# Patient Record
Sex: FEMALE | Race: WHITE | Employment: OTHER | URBAN - METROPOLITAN AREA
[De-identification: names, ages, dates, MRNs, and addresses within clinical notes are randomized per-mention and may not be internally consistent; named-entity substitution may affect disease eponyms.]

---

## 2019-06-07 ENCOUNTER — HOSPITAL ENCOUNTER (OUTPATIENT)
Dept: GENERAL RADIOLOGY | Age: 64
Discharge: HOME | End: 2019-06-09
Payer: MEDICARE

## 2019-06-07 DIAGNOSIS — Z13.9 VISIT FOR SCREENING: ICD-10-CM

## 2019-06-07 PROCEDURE — 77063 BREAST TOMOSYNTHESIS BI: CPT

## 2021-04-09 ENCOUNTER — IMMUNIZATION (OUTPATIENT)
Dept: PRIMARY CARE CLINIC | Age: 66
End: 2021-04-09
Payer: MEDICARE

## 2021-04-09 PROCEDURE — 91301 COVID-19, MODERNA VACCINE 100MCG/0.5ML DOSE: CPT | Performed by: NURSE PRACTITIONER

## 2021-04-09 PROCEDURE — 0011A COVID-19, MODERNA VACCINE 100MCG/0.5ML DOSE: CPT | Performed by: NURSE PRACTITIONER

## 2021-05-07 ENCOUNTER — IMMUNIZATION (OUTPATIENT)
Dept: PRIMARY CARE CLINIC | Age: 66
End: 2021-05-07
Payer: MEDICARE

## 2021-05-07 PROCEDURE — 91301 COVID-19, MODERNA VACCINE 100MCG/0.5ML DOSE: CPT | Performed by: NURSE PRACTITIONER

## 2021-05-07 PROCEDURE — 0012A COVID-19, MODERNA VACCINE 100MCG/0.5ML DOSE: CPT | Performed by: NURSE PRACTITIONER

## 2021-06-01 ENCOUNTER — HOSPITAL ENCOUNTER (OUTPATIENT)
Dept: GENERAL RADIOLOGY | Age: 66
Discharge: HOME | End: 2021-06-03
Payer: MEDICARE

## 2021-06-01 DIAGNOSIS — Z12.31 VISIT FOR SCREENING MAMMOGRAM: ICD-10-CM

## 2021-06-01 PROCEDURE — 77063 BREAST TOMOSYNTHESIS BI: CPT

## 2022-05-05 ENCOUNTER — HOSPITAL ENCOUNTER (INPATIENT)
Age: 67
LOS: 8 days | Discharge: HOME | DRG: 418 | End: 2022-05-13
Attending: EMERGENCY MEDICINE | Admitting: INTERNAL MEDICINE
Payer: MEDICARE

## 2022-05-05 ENCOUNTER — APPOINTMENT (OUTPATIENT)
Dept: CT IMAGING | Age: 67
DRG: 418 | End: 2022-05-05
Payer: MEDICARE

## 2022-05-05 ENCOUNTER — APPOINTMENT (OUTPATIENT)
Dept: GENERAL RADIOLOGY | Age: 67
DRG: 418 | End: 2022-05-05
Payer: MEDICARE

## 2022-05-05 ENCOUNTER — APPOINTMENT (OUTPATIENT)
Dept: ULTRASOUND IMAGING | Age: 67
DRG: 418 | End: 2022-05-05
Payer: MEDICARE

## 2022-05-05 DIAGNOSIS — K81.0 ACUTE CHOLECYSTITIS: Primary | ICD-10-CM

## 2022-05-05 DIAGNOSIS — K43.9 VENTRAL HERNIA WITHOUT OBSTRUCTION OR GANGRENE: ICD-10-CM

## 2022-05-05 DIAGNOSIS — N28.89 RENAL MASS: ICD-10-CM

## 2022-05-05 PROBLEM — E87.20 LACTIC ACID ACIDOSIS: Status: ACTIVE | Noted: 2022-05-05

## 2022-05-05 PROBLEM — E66.01 MORBID OBESITY WITH BMI OF 40.0-44.9, ADULT (HCC): Status: ACTIVE | Noted: 2022-05-05

## 2022-05-05 PROBLEM — R16.0 HEPATOMEGALY: Status: ACTIVE | Noted: 2022-05-05

## 2022-05-05 PROBLEM — I10 PRIMARY HYPERTENSION: Status: ACTIVE | Noted: 2022-05-05

## 2022-05-05 PROBLEM — E05.90 HYPERTHYROIDISM: Status: ACTIVE | Noted: 2022-05-05

## 2022-05-05 PROBLEM — G47.33 OSA (OBSTRUCTIVE SLEEP APNEA): Status: ACTIVE | Noted: 2022-05-05

## 2022-05-05 PROBLEM — E11.9 TYPE 2 DIABETES MELLITUS, WITHOUT LONG-TERM CURRENT USE OF INSULIN (HCC): Status: ACTIVE | Noted: 2022-05-05

## 2022-05-05 PROBLEM — J90 PLEURAL EFFUSION ON RIGHT: Status: ACTIVE | Noted: 2022-05-05

## 2022-05-05 PROBLEM — E78.5 HLD (HYPERLIPIDEMIA): Status: ACTIVE | Noted: 2022-05-05

## 2022-05-05 LAB
ALBUMIN SERPL-MCNC: 4.2 G/DL (ref 3.5–5.2)
ALP BLD-CCNC: 57 U/L (ref 35–104)
ALT SERPL-CCNC: 16 U/L (ref 0–32)
ANION GAP SERPL CALCULATED.3IONS-SCNC: 15 MMOL/L (ref 7–16)
AST SERPL-CCNC: 15 U/L (ref 0–31)
BACTERIA: ABNORMAL /HPF
BASOPHILS ABSOLUTE: 0.05 E9/L (ref 0–0.2)
BASOPHILS RELATIVE PERCENT: 0.3 % (ref 0–2)
BILIRUB SERPL-MCNC: 0.7 MG/DL (ref 0–1.2)
BILIRUBIN URINE: NEGATIVE
BLOOD, URINE: NEGATIVE
BUN BLDV-MCNC: 18 MG/DL (ref 6–23)
CALCIUM SERPL-MCNC: 9.7 MG/DL (ref 8.6–10.2)
CHLORIDE BLD-SCNC: 98 MMOL/L (ref 98–107)
CLARITY: CLEAR
CO2: 24 MMOL/L (ref 22–29)
COLOR: YELLOW
CREAT SERPL-MCNC: 1 MG/DL (ref 0.5–1)
EOSINOPHILS ABSOLUTE: 0.02 E9/L (ref 0.05–0.5)
EOSINOPHILS RELATIVE PERCENT: 0.1 % (ref 0–6)
EPITHELIAL CELLS, UA: ABNORMAL /HPF
GFR AFRICAN AMERICAN: >60
GFR NON-AFRICAN AMERICAN: 56 ML/MIN/1.73
GLUCOSE BLD-MCNC: 167 MG/DL (ref 74–99)
GLUCOSE URINE: >=1000 MG/DL
HCT VFR BLD CALC: 43.2 % (ref 34–48)
HEMOGLOBIN: 14.1 G/DL (ref 11.5–15.5)
IMMATURE GRANULOCYTES #: 0.06 E9/L
IMMATURE GRANULOCYTES %: 0.4 % (ref 0–5)
KETONES, URINE: 15 MG/DL
LACTIC ACID, SEPSIS: 3.6 MMOL/L (ref 0.5–1.9)
LACTIC ACID, SEPSIS: 3.8 MMOL/L (ref 0.5–1.9)
LEUKOCYTE ESTERASE, URINE: NEGATIVE
LIPASE: 22 U/L (ref 13–60)
LYMPHOCYTES ABSOLUTE: 2.15 E9/L (ref 1.5–4)
LYMPHOCYTES RELATIVE PERCENT: 13 % (ref 20–42)
MCH RBC QN AUTO: 27.5 PG (ref 26–35)
MCHC RBC AUTO-ENTMCNC: 32.6 % (ref 32–34.5)
MCV RBC AUTO: 84.2 FL (ref 80–99.9)
MONOCYTES ABSOLUTE: 2.08 E9/L (ref 0.1–0.95)
MONOCYTES RELATIVE PERCENT: 12.6 % (ref 2–12)
NEUTROPHILS ABSOLUTE: 12.15 E9/L (ref 1.8–7.3)
NEUTROPHILS RELATIVE PERCENT: 73.6 % (ref 43–80)
NITRITE, URINE: NEGATIVE
PDW BLD-RTO: 15.1 FL (ref 11.5–15)
PH UA: 5.5 (ref 5–9)
PLATELET # BLD: 483 E9/L (ref 130–450)
PMV BLD AUTO: 9.5 FL (ref 7–12)
POTASSIUM SERPL-SCNC: 4.5 MMOL/L (ref 3.5–5)
PROTEIN UA: NEGATIVE MG/DL
RBC # BLD: 5.13 E12/L (ref 3.5–5.5)
RBC UA: ABNORMAL /HPF (ref 0–2)
SODIUM BLD-SCNC: 137 MMOL/L (ref 132–146)
SPECIFIC GRAVITY UA: 1.01 (ref 1–1.03)
TOTAL PROTEIN: 7.6 G/DL (ref 6.4–8.3)
TROPONIN, HIGH SENSITIVITY: 10 NG/L (ref 0–9)
UROBILINOGEN, URINE: 0.2 E.U./DL
WBC # BLD: 16.5 E9/L (ref 4.5–11.5)
WBC UA: ABNORMAL /HPF (ref 0–5)

## 2022-05-05 PROCEDURE — 87088 URINE BACTERIA CULTURE: CPT

## 2022-05-05 PROCEDURE — 74178 CT ABD&PLV WO CNTR FLWD CNTR: CPT

## 2022-05-05 PROCEDURE — 80053 COMPREHEN METABOLIC PANEL: CPT

## 2022-05-05 PROCEDURE — 81001 URINALYSIS AUTO W/SCOPE: CPT

## 2022-05-05 PROCEDURE — 83690 ASSAY OF LIPASE: CPT

## 2022-05-05 PROCEDURE — 36415 COLL VENOUS BLD VENIPUNCTURE: CPT

## 2022-05-05 PROCEDURE — 6360000004 HC RX CONTRAST MEDICATION: Performed by: RADIOLOGY

## 2022-05-05 PROCEDURE — 96365 THER/PROPH/DIAG IV INF INIT: CPT

## 2022-05-05 PROCEDURE — 85025 COMPLETE CBC W/AUTO DIFF WBC: CPT

## 2022-05-05 PROCEDURE — 2500000003 HC RX 250 WO HCPCS: Performed by: NURSE PRACTITIONER

## 2022-05-05 PROCEDURE — 2140000000 HC CCU INTERMEDIATE R&B

## 2022-05-05 PROCEDURE — 96361 HYDRATE IV INFUSION ADD-ON: CPT

## 2022-05-05 PROCEDURE — 99285 EMERGENCY DEPT VISIT HI MDM: CPT

## 2022-05-05 PROCEDURE — 83605 ASSAY OF LACTIC ACID: CPT

## 2022-05-05 PROCEDURE — 6360000002 HC RX W HCPCS: Performed by: NURSE PRACTITIONER

## 2022-05-05 PROCEDURE — 96375 TX/PRO/DX INJ NEW DRUG ADDON: CPT

## 2022-05-05 PROCEDURE — A4216 STERILE WATER/SALINE, 10 ML: HCPCS | Performed by: NURSE PRACTITIONER

## 2022-05-05 PROCEDURE — 71045 X-RAY EXAM CHEST 1 VIEW: CPT

## 2022-05-05 PROCEDURE — 87040 BLOOD CULTURE FOR BACTERIA: CPT

## 2022-05-05 PROCEDURE — 84484 ASSAY OF TROPONIN QUANT: CPT

## 2022-05-05 PROCEDURE — 2580000003 HC RX 258: Performed by: NURSE PRACTITIONER

## 2022-05-05 PROCEDURE — 76705 ECHO EXAM OF ABDOMEN: CPT

## 2022-05-05 PROCEDURE — 93005 ELECTROCARDIOGRAM TRACING: CPT | Performed by: NURSE PRACTITIONER

## 2022-05-05 RX ORDER — 0.9 % SODIUM CHLORIDE 0.9 %
1000 INTRAVENOUS SOLUTION INTRAVENOUS ONCE
Status: COMPLETED | OUTPATIENT
Start: 2022-05-05 | End: 2022-05-05

## 2022-05-05 RX ORDER — METRONIDAZOLE 500 MG/100ML
500 INJECTION, SOLUTION INTRAVENOUS ONCE
Status: COMPLETED | OUTPATIENT
Start: 2022-05-05 | End: 2022-05-05

## 2022-05-05 RX ORDER — ONDANSETRON 2 MG/ML
4 INJECTION INTRAMUSCULAR; INTRAVENOUS ONCE
Status: COMPLETED | OUTPATIENT
Start: 2022-05-05 | End: 2022-05-05

## 2022-05-05 RX ORDER — ISOSORBIDE MONONITRATE 30 MG/1
30 TABLET, EXTENDED RELEASE ORAL DAILY
COMMUNITY

## 2022-05-05 RX ORDER — MORPHINE SULFATE 4 MG/ML
4 INJECTION, SOLUTION INTRAMUSCULAR; INTRAVENOUS ONCE
Status: COMPLETED | OUTPATIENT
Start: 2022-05-05 | End: 2022-05-05

## 2022-05-05 RX ORDER — 0.9 % SODIUM CHLORIDE 0.9 %
500 INTRAVENOUS SOLUTION INTRAVENOUS ONCE
Status: COMPLETED | OUTPATIENT
Start: 2022-05-05 | End: 2022-05-05

## 2022-05-05 RX ORDER — 0.9 % SODIUM CHLORIDE 0.9 %
1000 INTRAVENOUS SOLUTION INTRAVENOUS ONCE
Status: COMPLETED | OUTPATIENT
Start: 2022-05-05 | End: 2022-05-06

## 2022-05-05 RX ADMIN — SODIUM CHLORIDE 500 ML: 9 INJECTION, SOLUTION INTRAVENOUS at 15:58

## 2022-05-05 RX ADMIN — WATER 1000 MG: 1 INJECTION INTRAMUSCULAR; INTRAVENOUS; SUBCUTANEOUS at 19:46

## 2022-05-05 RX ADMIN — SODIUM CHLORIDE 1000 ML: 9 INJECTION, SOLUTION INTRAVENOUS at 23:41

## 2022-05-05 RX ADMIN — ONDANSETRON 4 MG: 2 INJECTION INTRAMUSCULAR; INTRAVENOUS at 15:58

## 2022-05-05 RX ADMIN — METRONIDAZOLE 500 MG: 500 INJECTION, SOLUTION INTRAVENOUS at 19:46

## 2022-05-05 RX ADMIN — SODIUM CHLORIDE 1000 ML: 9 INJECTION, SOLUTION INTRAVENOUS at 19:03

## 2022-05-05 RX ADMIN — FAMOTIDINE 20 MG: 10 INJECTION INTRAVENOUS at 19:46

## 2022-05-05 RX ADMIN — MORPHINE SULFATE 4 MG: 4 INJECTION, SOLUTION INTRAMUSCULAR; INTRAVENOUS at 15:59

## 2022-05-05 RX ADMIN — IOPAMIDOL 75 ML: 755 INJECTION, SOLUTION INTRAVENOUS at 17:10

## 2022-05-05 ASSESSMENT — PAIN DESCRIPTION - DESCRIPTORS
DESCRIPTORS: DISCOMFORT
DESCRIPTORS: DISCOMFORT;SHARP

## 2022-05-05 ASSESSMENT — PAIN DESCRIPTION - LOCATION
LOCATION: ABDOMEN
LOCATION: ABDOMEN

## 2022-05-05 ASSESSMENT — PAIN SCALES - GENERAL
PAINLEVEL_OUTOF10: 4
PAINLEVEL_OUTOF10: 7

## 2022-05-05 ASSESSMENT — PAIN - FUNCTIONAL ASSESSMENT
PAIN_FUNCTIONAL_ASSESSMENT: PREVENTS OR INTERFERES SOME ACTIVE ACTIVITIES AND ADLS
PAIN_FUNCTIONAL_ASSESSMENT: 0-10

## 2022-05-05 ASSESSMENT — PAIN DESCRIPTION - ORIENTATION: ORIENTATION: RIGHT;LEFT

## 2022-05-05 NOTE — ED PROVIDER NOTES
ED Attending shared visit  CC: No           [address] [facility]  Department of Emergency Medicine   ED  Encounter Note  Admit Date/RoomTime: 2022  3:16 PM  ED Room:   NAME: Mildred Collins  : 1955  MRN: [id number]     Chief Complaint:  Emesis (onset 2 am with n/v. Having dry heaves and LUQ, RLQ pain)    HISTORY OF PRESENT ILLNESS        Mildred Collins is a 61 y.o. female who presents to the ED for evaluation of nausea vomiting and abdominal pain beginning at 2 AM.  She denies any close contacts with similar symptoms, suspicious food intake or recent travel. She has had similar symptoms last month. She did eat roasted chicken and mashed potatoes last night for dinner. There is been no associated fever, chills, chest pain, shortness of breath, hematemesis, bowel changes, UTI symptoms, back pain or leg swelling. She reports her emesis to look like \"bile. \"  She has not taken any over-the-counter intervention for symptoms. She has no prior history of pancreatitis, gallbladder disease, liver disease or diverticulitis. She denies alcohol use. Her symptoms are moderate in severity and persistent nature. ROS   Pertinent positives and negatives are stated within HPI, all other systems reviewed and are negative. Past Medical History:  has a past medical history of Allergic rhinitis, Cerebrovascular disease, Cerebrovascular disease, Diabetes ([address] Hyperlipidemia, Hypertension, Irregular heart beat, Myocardial infarct ([address] Neuropathy, Obesity, Sleep apnea, Thyroid disease, and Weakness due to cerebrovascular accident (CVA). Surgical History:  has a past surgical history that includes Hysterectomy (); Colonoscopy (); and Colonoscopy (2016). Social History:  reports that she has never smoked. She has never used smokeless tobacco. She reports that she does not drink alcohol and does not use drugs.     Family History: family history includes Cancer in her maternal grandmother; Clotting Disorder in her brother; Diabetes in her father, maternal aunt, mother, paternal grandmother, and sister; Heart Attack in her brother; Heart Disease in her brother, brother, father, paternal grandfather, and sister; High Blood Pressure in her brother, father, mother, and sister; High Cholesterol in her mother; Kidney Disease in her maternal aunt. Allergies: Latex, Doxycycline, Seasonal, and Adhesive tape    PHYSICAL EXAM   Oxygen Saturation Interpretation: Normal on room air analysis. ED Triage Vitals [05/05/22 1459]   BP Temp Temp src Pulse Resp SpO2 Height Weight   (!) 143/73 97.1 °F (36.2 °C) -- 102 16 96 % 5' 1\" (1.549 m) 235 lb (106.6 kg)       Physical Exam  Constitutional/General: Alert and oriented x3, well appearing, non toxic  HEENT:  NC/NT. PERRLA. Oral mucosa moist. Airway patent. Neck: Supple, full ROM. No midline vertebral tenderness or crepitus. Respiratory: Lung sounds clear to auscultation bilaterally. No wheezes, rhonchi or stridor. Not in respiratory distress. CV:  Regular rate. Regular rhythm. No resting tachycardia on exam. No murmurs or rubs. 2+ distal pulses. GI:  Abdomen soft, tenderness diffuse through the upper abdomen with positive Olguin sign, diastasis recti. +BS. No rebound or rigidity. No pulsatile masses. Musculoskeletal: Moves all extremities x 4. Warm and well perfused. Capillary refill <3 seconds  Integument: Skin warm and dry. No rashes. Neurologic: Alert and oriented with no focal deficits, symmetric strength 5/5 in the upper and lower extremities bilaterally.     Lab / Imaging Results   (All laboratory and radiology results have been personally reviewed by myself)  Labs:  Results for orders placed or performed during the hospital encounter of 05/05/22   Comprehensive Metabolic Panel   Result Value Ref Range    Sodium 137 132 - 146 mmol/L    Potassium 4.5 3.5 - 5.0 mmol/L    Chloride 98 98 - 107 mmol/L    CO2 24 22 - 29 mmol/L    Anion Gap 15 7 - 16 mmol/L    Glucose 167 (H) 74 - 99 mg/dL    BUN 18 6 - 23 mg/dL    CREATININE 1.0 0.5 - 1.0 mg/dL    GFR Non-African American 56 >=60 mL/min/1.73    GFR African American >60     Calcium 9.7 8.6 - 10.2 mg/dL    Total Protein 7.6 6.4 - 8.3 g/dL    Albumin 4.2 3.5 - 5.2 g/dL    Total Bilirubin 0.7 0.0 - 1.2 mg/dL    Alkaline Phosphatase 57 35 - 104 U/L    ALT 16 0 - 32 U/L    AST 15 0 - 31 U/L   Lactate, Sepsis   Result Value Ref Range    Lactic Acid, Sepsis 3.6 (HH) 0.5 - 1.9 mmol/L   Lipase   Result Value Ref Range    Lipase 22 13 - 60 U/L   CBC with Auto Differential   Result Value Ref Range    WBC 16.5 (H) 4.5 - 11.5 E9/L    RBC 5.13 3.50 - 5.50 E12/L    Hemoglobin 14.1 11.5 - 15.5 g/dL    Hematocrit 43.2 34.0 - 48.0 %    MCV 84.2 80.0 - 99.9 fL    MCH 27.5 26.0 - 35.0 pg    MCHC 32.6 32.0 - 34.5 %    RDW 15.1 (H) 11.5 - 15.0 fL    Platelets 397 (H) 269 - 450 E9/L    MPV 9.5 7.0 - 12.0 fL    Neutrophils % 73.6 43.0 - 80.0 %    Immature Granulocytes % 0.4 0.0 - 5.0 %    Lymphocytes % 13.0 (L) 20.0 - 42.0 %    Monocytes % 12.6 (H) 2.0 - 12.0 %    Eosinophils % 0.1 0.0 - 6.0 %    Basophils % 0.3 0.0 - 2.0 %    Neutrophils Absolute 12.15 (H) 1.80 - 7.30 E9/L    Immature Granulocytes # 0.06 E9/L    Lymphocytes Absolute 2.15 1.50 - 4.00 E9/L    Monocytes Absolute 2.08 (H) 0.10 - 0.95 E9/L    Eosinophils Absolute 0.02 (L) 0.05 - 0.50 E9/L    Basophils Absolute 0.05 0.00 - 0.20 E9/L   Urinalysis with Microscopic   Result Value Ref Range    Color, UA Yellow Straw/Yellow    Clarity, UA Clear Clear    Glucose, Ur >=1000 (A) Negative mg/dL    Bilirubin Urine Negative Negative    Ketones, Urine 15 (A) Negative mg/dL    Specific Gravity, UA 1.010 1.005 - 1.030    Blood, Urine Negative Negative    pH, UA 5.5 5.0 - 9.0    Protein, UA Negative Negative mg/dL    Urobilinogen, Urine 0.2 <2.0 E.U./dL    Nitrite, Urine Negative Negative    Leukocyte Esterase, Urine Negative Negative    WBC, UA 0-1 0 - 5 /HPF    RBC, UA NONE 0 - 2 /HPF Epithelial Cells, UA RARE /HPF    Bacteria, UA RARE (A) None Seen /HPF   EKG 12 Lead   Result Value Ref Range    Ventricular Rate 97 BPM    Atrial Rate 97 BPM    P-R Interval 168 ms    QRS Duration 64 ms    Q-T Interval 336 ms    QTc Calculation (Bazett) 426 ms    P Axis 59 degrees    R Axis -38 degrees    T Axis 62 degrees     Imaging: All Radiology results interpreted by Radiologist unless otherwise noted. CT ABDOMEN PELVIS W WO CONTRAST Additional Contrast? None   Final Result   1. Cholelithiasis with suggestion of acute cholecystitis. 2. Lobular masslike contour at the inferior pole of the right kidney is   nearly isodense to normal renal cortex. This is suspicious for enhancing   mass. Recommend further characterization with MRI without and with   intravenous contrast.   3. Left adrenal lipoma or myelolipoma. 4. Multiple ventral hernias. US ABDOMEN LIMITED   Final Result   1. Hepatomegaly with fatty infiltration. 2.  Hydropic gallbladder with cholelithiasis and sludge. Gallbladder wall   thickening is also seen. Rule out cholecystitis. Clinical correlation is   recommended. Radionuclide medicine hepatobiliary imaging can be considered   for further evaluation. 3.  Minimal dilatation of the CBD to 0.7 cm. Rule out choledocholithiasis. MRCP can be considered for further evaluation. 4.  Solid right renal mass. Rule out renal cell carcinoma. CT scan of the   abdomen and pelvis with and without intravenous contrast is recommended. 5.  Right pleural effusion. XR CHEST PORTABLE   Final Result   No acute process. EKG #1:  Interpreted by emergency department attending physician unless otherwise noted.     5/5/22  Time: 1529    Rhythm: normal sinus   Rate: 97  Axis: normal  Conduction: normal  ST Segments: no acute change  T Waves: non specific changes  Clinical Impression: NSR, no STEMI asd interpreted by Dr. Mikayla Spain  Comparison to Prior tracings: There are no previous tracings available for comparison. ED Course / Medical Decision Making     Medications   famotidine (PEPCID) 20 mg in sodium chloride (PF) 10 mL injection (20 mg IntraVENous Given 5/5/22 1946)   metronidazole (FLAGYL) 500 mg in 0.9% NaCl 100 mL IVPB premix (500 mg IntraVENous New Bag 5/5/22 1946)   0.9 % sodium chloride bolus (0 mLs IntraVENous Stopped 5/5/22 1706)   ondansetron (ZOFRAN) injection 4 mg (4 mg IntraVENous Given 5/5/22 1558)   morphine sulfate (PF) injection 4 mg (4 mg IntraVENous Given 5/5/22 1559)   0.9 % sodium chloride bolus (1,000 mLs IntraVENous New Bag 5/5/22 1903)   iopamidol (ISOVUE-370) 76 % injection 75 mL (75 mLs IntraVENous Given 5/5/22 1710)   cefTRIAXone (ROCEPHIN) 1,000 mg in sterile water 10 mL IV syringe (1,000 mg IntraVENous Given 5/5/22 1946)        Re-examination:  5/5/22       Time: 1730  Patients condition is improving after treatment. Reports her pain to be improved after medications. She has had no emesis. Abdomen remains soft but tender. Discussed ultrasound results and elevated lactate and white count requiring admission. She states she had an appointment with general surgery as referred by her PCP but has yet to establish with Dr. Portillo Rangel. She prefers [facility] for admission. Time: 2000  Patient aware of admission arrangements and to be n.p.o. She continues to be comfortable and no emesis. Abdomen soft. Consult(s):   IP CONSULT TO GENERAL SURGERY  IP CONSULT TO HOSPITALIST    Time: 5 Spoke to Dr. Portillo Rangel who accepts the patient for consultation at [facility] and recommends Rocephin 1 g and Flagyl 500 IV and residents will see the patient upon arrival to her room assignment. He is aware of the renal mass and radioactive iodine as well. Hospitalist to admit the patient. Time: 80 Spoke with Izzy Petersen for the hospitalist group.   Discussed the case, acute cholecystitis as well as incidental findings on CT and the fact that patient has recently been treated with radioactive iodine. Access center made aware as well regarding bed placement. April aware of troponin still pending as analyzer is down and  to [facility]    Procedure(s):   none    MDM:   Patient evaluated by myself and Dr. Delia Rose. Differential diagnosis of cholecystitis, appendicitis and diverticulitis to mention a few. EKG as above with no previous for comparison and troponin pending with the analyzer down this facility. Ultrasound is interpreted by radiologist supporting cholecystitis however there are other incidental findings requiring CT. CT of the abdomen pelvis with and without contrast confirms her cholecystitis in addition to a mass in the kidney and multiple ventral hernias, none with strangulation or obstruction. Given her cholecystitis with leukocytosis and elevated lactate, she is appropriate for general surgery consultation which she prefers [facility] and she was pending an appointment on an outpatient basis with Dr. Nolberto Rodriguez within the next week therefore she prefers him. Case was discussed with him, IV antibiotics per his request and admission arrangements made with the hospitalist. Patient aware NPO. Plan of Care/Counseling:  WILL Keene CNP and EM Attending Physician reviewed today's visit with the patient in addition to providing specific details for the plan of care and counseling regarding the diagnosis and prognosis. Questions are answered at this time and are agreeable with the plan. ASSESSMENT     1. Acute cholecystitis    2. Ventral hernia without obstruction or gangrene    3. Renal mass      PLAN   Admit to Telemetry Unit. Patient condition is stable    New Medications     New Prescriptions    No medications on file     Electronically signed by WILL Keene CNP   DD: 5/5/22  **This report was transcribed using voice recognition software.  Every effort was made to ensure accuracy; however, inadvertent computerized transcription errors may be present.   END OF ED PROVIDER NOTE       WILL Vyas - TRUNG  05/05/22 2018

## 2022-05-06 ENCOUNTER — APPOINTMENT (OUTPATIENT)
Dept: MRI IMAGING | Age: 67
DRG: 418 | End: 2022-05-06
Payer: MEDICARE

## 2022-05-06 ENCOUNTER — APPOINTMENT (OUTPATIENT)
Dept: NUCLEAR MEDICINE | Age: 67
DRG: 418 | End: 2022-05-06
Payer: MEDICARE

## 2022-05-06 LAB
ALBUMIN SERPL-MCNC: 3.6 G/DL (ref 3.5–5.2)
ALP BLD-CCNC: 55 U/L (ref 35–104)
ALT SERPL-CCNC: 14 U/L (ref 0–32)
ANION GAP SERPL CALCULATED.3IONS-SCNC: 11 MMOL/L (ref 7–16)
ANION GAP SERPL CALCULATED.3IONS-SCNC: 16 MMOL/L (ref 7–16)
AST SERPL-CCNC: 16 U/L (ref 0–31)
BASOPHILS ABSOLUTE: 0.06 E9/L (ref 0–0.2)
BASOPHILS RELATIVE PERCENT: 0.4 % (ref 0–2)
BILIRUB SERPL-MCNC: 0.7 MG/DL (ref 0–1.2)
BILIRUBIN DIRECT: <0.2 MG/DL (ref 0–0.3)
BILIRUBIN, INDIRECT: NORMAL MG/DL (ref 0–1)
BUN BLDV-MCNC: 10 MG/DL (ref 6–23)
BUN BLDV-MCNC: 9 MG/DL (ref 6–23)
BURR CELLS: ABNORMAL
CALCIUM SERPL-MCNC: 8.8 MG/DL (ref 8.6–10.2)
CALCIUM SERPL-MCNC: 8.8 MG/DL (ref 8.6–10.2)
CHLORIDE BLD-SCNC: 100 MMOL/L (ref 98–107)
CHLORIDE BLD-SCNC: 101 MMOL/L (ref 98–107)
CO2: 20 MMOL/L (ref 22–29)
CO2: 24 MMOL/L (ref 22–29)
CREAT SERPL-MCNC: 0.8 MG/DL (ref 0.5–1)
CREAT SERPL-MCNC: 0.8 MG/DL (ref 0.5–1)
EKG ATRIAL RATE: 97 BPM
EKG P AXIS: 59 DEGREES
EKG P-R INTERVAL: 168 MS
EKG Q-T INTERVAL: 336 MS
EKG QRS DURATION: 64 MS
EKG QTC CALCULATION (BAZETT): 426 MS
EKG R AXIS: -38 DEGREES
EKG T AXIS: 62 DEGREES
EKG VENTRICULAR RATE: 97 BPM
EOSINOPHILS ABSOLUTE: 0.02 E9/L (ref 0.05–0.5)
EOSINOPHILS RELATIVE PERCENT: 0.1 % (ref 0–6)
GFR AFRICAN AMERICAN: >60
GFR AFRICAN AMERICAN: >60
GFR NON-AFRICAN AMERICAN: >60 ML/MIN/1.73
GFR NON-AFRICAN AMERICAN: >60 ML/MIN/1.73
GLUCOSE BLD-MCNC: 164 MG/DL (ref 74–99)
GLUCOSE BLD-MCNC: 167 MG/DL (ref 74–99)
HCT VFR BLD CALC: 41.1 % (ref 34–48)
HEMOGLOBIN: 12.7 G/DL (ref 11.5–15.5)
IMMATURE GRANULOCYTES #: 0.09 E9/L
IMMATURE GRANULOCYTES %: 0.6 % (ref 0–5)
LACTIC ACID: 1.7 MMOL/L (ref 0.5–2.2)
LYMPHOCYTES ABSOLUTE: 1.38 E9/L (ref 1.5–4)
LYMPHOCYTES RELATIVE PERCENT: 9.1 % (ref 20–42)
MCH RBC QN AUTO: 27.4 PG (ref 26–35)
MCHC RBC AUTO-ENTMCNC: 30.9 % (ref 32–34.5)
MCV RBC AUTO: 88.6 FL (ref 80–99.9)
METER GLUCOSE: 130 MG/DL (ref 74–99)
METER GLUCOSE: 131 MG/DL (ref 74–99)
METER GLUCOSE: 142 MG/DL (ref 74–99)
METER GLUCOSE: 146 MG/DL (ref 74–99)
MONOCYTES ABSOLUTE: 1.73 E9/L (ref 0.1–0.95)
MONOCYTES RELATIVE PERCENT: 11.5 % (ref 2–12)
NEUTROPHILS ABSOLUTE: 11.82 E9/L (ref 1.8–7.3)
NEUTROPHILS RELATIVE PERCENT: 78.3 % (ref 43–80)
PDW BLD-RTO: 15.2 FL (ref 11.5–15)
PLATELET # BLD: 366 E9/L (ref 130–450)
PMV BLD AUTO: 10 FL (ref 7–12)
POIKILOCYTES: ABNORMAL
POLYCHROMASIA: ABNORMAL
POTASSIUM REFLEX MAGNESIUM: 4.4 MMOL/L (ref 3.5–5)
POTASSIUM SERPL-SCNC: 4.5 MMOL/L (ref 3.5–5)
RBC # BLD: 4.64 E12/L (ref 3.5–5.5)
SODIUM BLD-SCNC: 136 MMOL/L (ref 132–146)
SODIUM BLD-SCNC: 136 MMOL/L (ref 132–146)
TOTAL PROTEIN: 6.9 G/DL (ref 6.4–8.3)
TROPONIN, HIGH SENSITIVITY: 11 NG/L (ref 0–9)
WBC # BLD: 15.1 E9/L (ref 4.5–11.5)

## 2022-05-06 PROCEDURE — 74181 MRI ABDOMEN W/O CONTRAST: CPT

## 2022-05-06 PROCEDURE — 85025 COMPLETE CBC W/AUTO DIFF WBC: CPT

## 2022-05-06 PROCEDURE — 93010 ELECTROCARDIOGRAM REPORT: CPT | Performed by: INTERNAL MEDICINE

## 2022-05-06 PROCEDURE — 2500000003 HC RX 250 WO HCPCS: Performed by: NURSE PRACTITIONER

## 2022-05-06 PROCEDURE — 2580000003 HC RX 258: Performed by: NURSE PRACTITIONER

## 2022-05-06 PROCEDURE — 80053 COMPREHEN METABOLIC PANEL: CPT

## 2022-05-06 PROCEDURE — 2140000000 HC CCU INTERMEDIATE R&B

## 2022-05-06 PROCEDURE — 2580000003 HC RX 258: Performed by: SURGERY

## 2022-05-06 PROCEDURE — A9537 TC99M MEBROFENIN: HCPCS | Performed by: STUDENT IN AN ORGANIZED HEALTH CARE EDUCATION/TRAINING PROGRAM

## 2022-05-06 PROCEDURE — 78226 HEPATOBILIARY SYSTEM IMAGING: CPT | Performed by: RADIOLOGY

## 2022-05-06 PROCEDURE — 36415 COLL VENOUS BLD VENIPUNCTURE: CPT

## 2022-05-06 PROCEDURE — 82248 BILIRUBIN DIRECT: CPT

## 2022-05-06 PROCEDURE — A4216 STERILE WATER/SALINE, 10 ML: HCPCS | Performed by: NURSE PRACTITIONER

## 2022-05-06 PROCEDURE — 80048 BASIC METABOLIC PNL TOTAL CA: CPT

## 2022-05-06 PROCEDURE — 6370000000 HC RX 637 (ALT 250 FOR IP): Performed by: NURSE PRACTITIONER

## 2022-05-06 PROCEDURE — 6360000002 HC RX W HCPCS: Performed by: NURSE PRACTITIONER

## 2022-05-06 PROCEDURE — 3430000000 HC RX DIAGNOSTIC RADIOPHARMACEUTICAL: Performed by: STUDENT IN AN ORGANIZED HEALTH CARE EDUCATION/TRAINING PROGRAM

## 2022-05-06 PROCEDURE — 78226 HEPATOBILIARY SYSTEM IMAGING: CPT

## 2022-05-06 PROCEDURE — 82962 GLUCOSE BLOOD TEST: CPT

## 2022-05-06 PROCEDURE — 83605 ASSAY OF LACTIC ACID: CPT

## 2022-05-06 PROCEDURE — 84484 ASSAY OF TROPONIN QUANT: CPT

## 2022-05-06 PROCEDURE — 6360000002 HC RX W HCPCS: Performed by: SURGERY

## 2022-05-06 RX ORDER — SODIUM CHLORIDE 9 MG/ML
INJECTION, SOLUTION INTRAVENOUS CONTINUOUS
Status: DISCONTINUED | OUTPATIENT
Start: 2022-05-06 | End: 2022-05-13 | Stop reason: HOSPADM

## 2022-05-06 RX ORDER — METRONIDAZOLE 500 MG/100ML
500 INJECTION, SOLUTION INTRAVENOUS EVERY 8 HOURS
Status: COMPLETED | OUTPATIENT
Start: 2022-05-06 | End: 2022-05-12

## 2022-05-06 RX ORDER — ENOXAPARIN SODIUM 100 MG/ML
30 INJECTION SUBCUTANEOUS 2 TIMES DAILY
Status: DISCONTINUED | OUTPATIENT
Start: 2022-05-06 | End: 2022-05-13 | Stop reason: HOSPADM

## 2022-05-06 RX ORDER — SODIUM CHLORIDE 0.9 % (FLUSH) 0.9 %
5-40 SYRINGE (ML) INJECTION EVERY 12 HOURS SCHEDULED
Status: DISCONTINUED | OUTPATIENT
Start: 2022-05-06 | End: 2022-05-13 | Stop reason: HOSPADM

## 2022-05-06 RX ORDER — INSULIN LISPRO 100 [IU]/ML
0-12 INJECTION, SOLUTION INTRAVENOUS; SUBCUTANEOUS
Status: DISCONTINUED | OUTPATIENT
Start: 2022-05-06 | End: 2022-05-13 | Stop reason: HOSPADM

## 2022-05-06 RX ORDER — AMLODIPINE BESYLATE 10 MG/1
10 TABLET ORAL DAILY
Status: DISCONTINUED | OUTPATIENT
Start: 2022-05-06 | End: 2022-05-13 | Stop reason: HOSPADM

## 2022-05-06 RX ORDER — METOPROLOL TARTRATE 50 MG/1
50 TABLET, FILM COATED ORAL 2 TIMES DAILY
Status: DISCONTINUED | OUTPATIENT
Start: 2022-05-06 | End: 2022-05-13 | Stop reason: HOSPADM

## 2022-05-06 RX ORDER — ACETAMINOPHEN 325 MG/1
650 TABLET ORAL EVERY 6 HOURS PRN
Status: DISCONTINUED | OUTPATIENT
Start: 2022-05-06 | End: 2022-05-13 | Stop reason: HOSPADM

## 2022-05-06 RX ORDER — LISINOPRIL 20 MG/1
20 TABLET ORAL DAILY
Status: DISCONTINUED | OUTPATIENT
Start: 2022-05-06 | End: 2022-05-13 | Stop reason: HOSPADM

## 2022-05-06 RX ORDER — ONDANSETRON 4 MG/1
4 TABLET, ORALLY DISINTEGRATING ORAL EVERY 8 HOURS PRN
Status: DISCONTINUED | OUTPATIENT
Start: 2022-05-06 | End: 2022-05-13 | Stop reason: HOSPADM

## 2022-05-06 RX ORDER — DEXTROSE MONOHYDRATE 50 MG/ML
100 INJECTION, SOLUTION INTRAVENOUS PRN
Status: DISCONTINUED | OUTPATIENT
Start: 2022-05-06 | End: 2022-05-13 | Stop reason: HOSPADM

## 2022-05-06 RX ORDER — DEXTROSE MONOHYDRATE 25 G/50ML
12.5 INJECTION, SOLUTION INTRAVENOUS PRN
Status: DISCONTINUED | OUTPATIENT
Start: 2022-05-06 | End: 2022-05-13 | Stop reason: HOSPADM

## 2022-05-06 RX ORDER — INSULIN LISPRO 100 [IU]/ML
0-6 INJECTION, SOLUTION INTRAVENOUS; SUBCUTANEOUS NIGHTLY
Status: DISCONTINUED | OUTPATIENT
Start: 2022-05-06 | End: 2022-05-13 | Stop reason: HOSPADM

## 2022-05-06 RX ORDER — NICOTINE POLACRILEX 4 MG
15 LOZENGE BUCCAL PRN
Status: DISCONTINUED | OUTPATIENT
Start: 2022-05-06 | End: 2022-05-13 | Stop reason: HOSPADM

## 2022-05-06 RX ORDER — SODIUM CHLORIDE 9 MG/ML
INJECTION, SOLUTION INTRAVENOUS PRN
Status: DISCONTINUED | OUTPATIENT
Start: 2022-05-06 | End: 2022-05-13 | Stop reason: HOSPADM

## 2022-05-06 RX ORDER — TORSEMIDE 20 MG/1
20 TABLET ORAL DAILY
Status: DISCONTINUED | OUTPATIENT
Start: 2022-05-06 | End: 2022-05-13 | Stop reason: HOSPADM

## 2022-05-06 RX ORDER — BISACODYL 10 MG
10 SUPPOSITORY, RECTAL RECTAL DAILY PRN
Status: DISCONTINUED | OUTPATIENT
Start: 2022-05-06 | End: 2022-05-13 | Stop reason: HOSPADM

## 2022-05-06 RX ORDER — ATORVASTATIN CALCIUM 20 MG/1
20 TABLET, FILM COATED ORAL NIGHTLY
Status: DISCONTINUED | OUTPATIENT
Start: 2022-05-06 | End: 2022-05-13 | Stop reason: HOSPADM

## 2022-05-06 RX ORDER — ISOSORBIDE MONONITRATE 30 MG/1
30 TABLET, EXTENDED RELEASE ORAL DAILY
Status: DISCONTINUED | OUTPATIENT
Start: 2022-05-06 | End: 2022-05-13 | Stop reason: HOSPADM

## 2022-05-06 RX ORDER — ACETAMINOPHEN 650 MG/1
650 SUPPOSITORY RECTAL EVERY 6 HOURS PRN
Status: DISCONTINUED | OUTPATIENT
Start: 2022-05-06 | End: 2022-05-13 | Stop reason: HOSPADM

## 2022-05-06 RX ORDER — SODIUM CHLORIDE 0.9 % (FLUSH) 0.9 %
5-40 SYRINGE (ML) INJECTION PRN
Status: DISCONTINUED | OUTPATIENT
Start: 2022-05-06 | End: 2022-05-13 | Stop reason: HOSPADM

## 2022-05-06 RX ORDER — M-VIT,TX,IRON,MINS/CALC/FOLIC 27MG-0.4MG
1 TABLET ORAL DAILY
Status: DISCONTINUED | OUTPATIENT
Start: 2022-05-06 | End: 2022-05-13 | Stop reason: HOSPADM

## 2022-05-06 RX ORDER — ONDANSETRON 2 MG/ML
4 INJECTION INTRAMUSCULAR; INTRAVENOUS EVERY 6 HOURS PRN
Status: DISCONTINUED | OUTPATIENT
Start: 2022-05-06 | End: 2022-05-13 | Stop reason: HOSPADM

## 2022-05-06 RX ADMIN — SODIUM CHLORIDE: 9 INJECTION, SOLUTION INTRAVENOUS at 05:36

## 2022-05-06 RX ADMIN — ATORVASTATIN CALCIUM 20 MG: 20 TABLET, FILM COATED ORAL at 21:51

## 2022-05-06 RX ADMIN — METRONIDAZOLE 500 MG: 500 INJECTION, SOLUTION INTRAVENOUS at 06:21

## 2022-05-06 RX ADMIN — Medication 10 ML: at 22:04

## 2022-05-06 RX ADMIN — Medication 8.6 MILLICURIE: at 09:41

## 2022-05-06 RX ADMIN — METOPROLOL TARTRATE 50 MG: 50 TABLET, FILM COATED ORAL at 21:50

## 2022-05-06 RX ADMIN — CEFTRIAXONE 2000 MG: 2 INJECTION, POWDER, FOR SOLUTION INTRAMUSCULAR; INTRAVENOUS at 21:52

## 2022-05-06 RX ADMIN — FAMOTIDINE 20 MG: 10 INJECTION INTRAVENOUS at 21:51

## 2022-05-06 RX ADMIN — ONDANSETRON 4 MG: 2 INJECTION INTRAMUSCULAR; INTRAVENOUS at 05:30

## 2022-05-06 RX ADMIN — METRONIDAZOLE 500 MG: 500 INJECTION, SOLUTION INTRAVENOUS at 13:19

## 2022-05-06 RX ADMIN — ENOXAPARIN SODIUM 30 MG: 100 INJECTION SUBCUTANEOUS at 21:51

## 2022-05-06 RX ADMIN — Medication 10 ML: at 08:10

## 2022-05-06 RX ADMIN — METRONIDAZOLE 500 MG: 500 INJECTION, SOLUTION INTRAVENOUS at 21:57

## 2022-05-06 ASSESSMENT — PAIN SCALES - GENERAL
PAINLEVEL_OUTOF10: 0
PAINLEVEL_OUTOF10: 0
PAINLEVEL_OUTOF10: 4
PAINLEVEL_OUTOF10: 0

## 2022-05-06 ASSESSMENT — PAIN - FUNCTIONAL ASSESSMENT
PAIN_FUNCTIONAL_ASSESSMENT: NONE - DENIES PAIN
PAIN_FUNCTIONAL_ASSESSMENT: NONE - DENIES PAIN

## 2022-05-06 NOTE — H&P
[facility]  History and Physical      CHIEF COMPLAINT:    Chief Complaint   Patient presents with    Emesis     onset 2 am with n/v. Having dry heaves and LUQ, RLQ pain        Patient of Alley Finnegan DO presents with:  Acute cholecystitis    History of Present Illness:   Patient is a 70-year-old female with a past medical history of CVA, DM, HLD, HTN, MI who presents to the emergency room for emesis. Patient states that Wednesday evening she ate a chicken dinner with mesh potatoes and gravy and was awoken at 2 AM Thursday morning with nausea vomiting and upper abdominal pain. Patient states that she had an episode like this about a month ago but did not seek any medical attention. Upon arrival to emergency patient had an ultrasound of the abdomen suggesting possible acute cholecystitis. CT abdomen was obtained and incidentally found a masslike contour in the inferior pole of the right kidney suspicious for enhancing mass. Labs revealed leukocytosis of 16.5, lactic acid of 3.6. Upon assessment patient denies any CP, SOB, fever, chills, N/V/D. Patient is admitted to intermediate telemetry unit for further work-up and treatment. REVIEW OF SYSTEMS:  Pertinent negatives are above in HPI. 10 point ROS otherwise negative.       Past Medical History:   Diagnosis Date    Allergic rhinitis     Cerebrovascular disease 2012    Cerebrovascular disease 2014    Diabetes ([address]     type 2    Hyperlipidemia     Hypertension     Irregular heart beat 2001    Myocardial infarct ([address] 2014    Neuropathy     right foot    Obesity     Sleep apnea     declines to to wear a CPAP    Thyroid disease     hyperthyroid    Weakness due to cerebrovascular accident (CVA) 2014    on right side         Past Surgical History:   Procedure Laterality Date    COLONOSCOPY  2000    COLONOSCOPY  2016    HYSTERECTOMY  2001    complete       Medications Prior to Admission:    Medications Prior to Admission: Fish Oil-Krill Oil (MEGARED ADVANCED 4 IN 1 PO), Take by mouth  isosorbide mononitrate (IMDUR) 30 MG extended release tablet, Take 30 mg by mouth daily  NONFORMULARY,   Semaglutide (OZEMPIC, 2 MG/DOSE, SC), Inject into the skin  Ertugliflozin L-PyroglutamicAc (STEGLATRO PO), Take by mouth  cephALEXin (KEFLEX) 500 MG capsule, Take 500 mg by mouth 2 times daily  lidocaine (XYLOCAINE) 5 % ointment,   metFORMIN (GLUCOPHAGE) 500 MG tablet, Take 500 mg by mouth 3 times daily (with meals)  metoprolol (LOPRESSOR) 50 MG tablet, Take 50 mg by mouth 2 times daily  amLODIPine (NORVASC) 10 MG tablet, Take 10 mg by mouth daily  torsemide (DEMADEX) 20 MG tablet, Take 20 mg by mouth daily  atorvastatin (LIPITOR) 20 MG tablet, Take 20 mg by mouth daily  lisinopril (PRINIVIL;ZESTRIL) 20 MG tablet, Take 20 mg by mouth daily  Multiple Vitamins-Minerals (THERAPEUTIC MULTIVITAMIN-MINERALS) tablet, Take 1 tablet by mouth daily  Calcium-Vitamin D-Vitamin K (VIACTIV PO), Take 1 tablet by mouth daily  Biotin w/ Vitamins C & E (HAIR SKIN & NAILS GUMMIES PO), Take 2 tablets by mouth daily  aspirin 325 MG EC tablet, Take 325 mg by mouth daily  Omega-3 Fatty Acids (FISH OIL CONCENTRATE PO), Take 1 capsule by mouth daily  aspirin-acetaminophen-caffeine (EXCEDRIN MIGRAINE) 250-250-65 MG per tablet, Take 1 tablet by mouth every 6 hours as needed for Headaches    Note that the patient's home medications were reviewed and the above list is accurate to the best of my knowledge at the time of the exam.    Allergies:    Latex, Doxycycline, Seasonal, and Adhesive tape    Social History:    reports that she has never smoked. She has never used smokeless tobacco. She reports that she does not drink alcohol and does not use drugs. Family History:   family history includes Cancer in her maternal grandmother; Clotting Disorder in her brother; Diabetes in her father, maternal aunt, mother, paternal grandmother, and sister;  Heart Attack in her brother; Heart Disease in her brother, brother, father, paternal grandfather, and sister; High Blood Pressure in her brother, father, mother, and sister; High Cholesterol in her mother; Kidney Disease in her maternal aunt. PHYSICAL EXAM:    Vitals:  BP (!) 151/71   Pulse 100   Temp 99.1 °F (37.3 °C)   Resp 18   Ht 5' 1\" (1.549 m)   Wt 235 lb (106.6 kg)   SpO2 98%   BMI 44.40 kg/m²       General appearance: NAD, conversant, obese  Eyes: Sclerae anicteric, PERRLA  HEENT: AT/NC, MMM  Neck: FROM, supple, no thyromegaly  Lymph: No cervical / supraclavicular lymphadenopathy  Lungs: Clear to auscultation, WOB normal  CV: RRR, no MRGs, no lower extremity edema  Abdomen: Soft, tender on palp RUQ; no masses or HSM, +BS  Extremities: FROM without synovitis. No clubbing or cyanosis of the hands. Skin: no rash, induration, lesions, or ulcers  Psych: Calm and cooperative. Normal judgement and insight. Normal mood and affect. Neuro: Alert and interactive, face symmetric, speech fluent. LABS:  All labs reviewed.   Of note:  CBC:   Lab Results   Component Value Date    WBC 15.1 05/06/2022    RBC 4.64 05/06/2022    HGB 12.7 05/06/2022    HCT 41.1 05/06/2022    MCV 88.6 05/06/2022    MCH 27.4 05/06/2022    MCHC 30.9 05/06/2022    RDW 15.2 05/06/2022     05/06/2022    MPV 10.0 05/06/2022     CMP:    Lab Results   Component Value Date     05/06/2022     05/06/2022    K 4.4 05/06/2022    K 4.5 05/06/2022     05/06/2022     05/06/2022    CO2 24 05/06/2022    CO2 20 05/06/2022    BUN 10 05/06/2022    BUN 9 05/06/2022    CREATININE 0.8 05/06/2022    CREATININE 0.8 05/06/2022    GFRAA >60 05/06/2022    GFRAA >60 05/06/2022    LABGLOM >60 05/06/2022    LABGLOM >60 05/06/2022    GLUCOSE 167 05/06/2022    GLUCOSE 164 05/06/2022    PROT 6.9 05/06/2022    LABALBU 3.6 05/06/2022    CALCIUM 8.8 05/06/2022    CALCIUM 8.8 05/06/2022    BILITOT 0.7 05/06/2022    ALKPHOS 55 05/06/2022    AST 16 05/06/2022    ALT 14 05/06/2022       Imaging:  CXR: Negative  Ultrasound abdomen: Hydropic gallbladder with wall thickening, right pleural effusion  CT abdomen: Cholelithiasis with suggestion of acute cholecystitis, lobular masslike contour at the inferior pole of the right kidney is nearly isodense to normal renal cortex suspicious for enhancing mass. Multiple ventral hernias. EKG:  Normal sinus rhythm    Telemetry:  Normal sinus rhythm    ASSESSMENT/PLAN:  Principal Problem:    Acute cholecystitis  Active Problems:    TIM (obstructive sleep apnea)    Type 2 diabetes mellitus, without long-term current use of insulin (HCC)    Primary hypertension    Lactic acid acidosis    Renal mass    Hepatomegaly    Pleural effusion on right    Leukocytosis  Resolved Problems:    * No resolved hospital problems. *    Patient is a 42-year-old female admitted to [facility] for  Acute cholecystitis  -Monitor labs, CBC  -WBC 16.5 >15.1  -General Surgery consulted  -US RUQ: Hydropic gallbladder with wall thickening  -NM HIDA scan: Pending, if positive tentative cholecystectomy 5/9  -IV Rocephin and Flagyl  -IVF   -NPO  -Cardiology consulted for preoperative cardiovascular assessment    DM type II  -Monitor labs  -ISS glucose control  -Hypoglycemia protocol initiated    New right kidney mass  -Will need MRI with and without contrast for further work-up  -Monitor kidney function    Hypertension  -continue home medications    Right Pleural effusion   -monitor respiratory status   -obtai CXR if respiratory status changes      DVT prophylaxis Lovenox  PT OT   Discharge planning    Code status: Full  Requires Inpatient level of care  WILL Jaimes - CNP    2:44 PM  5/6/2022     Above note edited to reflect my thoughts   Morbidly obese   ruqq abd pain - hida positive   Await surgery, MRI abdo pending    cmp in am     I personally saw, examined and provided care for the patient.  Radiographs, labs and medication list were reviewed by me independently. The case was discussed in detail and plans for care were established. Review of WILL Arboleda-CNP   , documentation was conducted and revisions were made as appropriate directly by me. I agree with the above documented exam, problem list, and plan of care.      Aaron Dubon MD  7:15 PM  5/6/2022

## 2022-05-06 NOTE — CARE COORDINATION
Care Coordination: met with pt at bedside to discuss transition of care upon discharge. Lives in 2 story home with mother, sister and nephew. No difficulty with steps, uses cane at times. Follows with Dr Elias Lu, uses [facility]. No hx of hhc, dme or mariana. Does not anticipate any home going needs, she is not sure which family member will pick her up but she states \" one of then will\".   Will follow    Varun Kellogg Intro Statement (Will Not Render If Left Blank): The patient is undergoing superficial radiation therapy for skin cancer and presents for weekly evaluation and management.  Per protocol and as documented on the flow sheet, the patient was questioned as to subjective redness, pruritus, pain, drainage, fatigue, or any other symptoms.  Objectively, the radiation area was evaluated with regards to erythema, atrophy, scale, crusting, erosion, ulceration, edema, purpura, tenderness, warmth, drainage, and any other findings.  The plan was extensively reviewed including the dose, and dosing schedule.  The simulation and clinical setup was also reviewed as was the external and any internal shields and based on this review the appropriateness and sufficiency of treatment was determined.

## 2022-05-06 NOTE — CONSULTS
GENERAL SURGERY  CONSULT NOTE  5/6/2022    Physician Consulted: Dr. Neil Wise  Reason for Consult: possible acute cholecystitis  Referring Physician: WILL Thurston -TRUNG    Chief Complaint   Patient presents with    Emesis     onset 2 am with n/v. Having dry heaves and LUQ, RLQ pain         HPI  Bao Burnett is a 61 y.o. female who presents for evaluation of acute onset right upper quadrant pain that woke her up from sleep in the middle of the night yesterday. Pain is associated with nausea and vomiting. PMH of prior stroke in 2014 on aspirin, obesity BMI 45, HLD, diabetes, hypertension. States she had similar symptoms a month ago. Unable to elicit history of recurrent postprandial pain and nausea. Denies chest pain or shortness of breath. Denies changes in bowel habits. She also complains of mid back pain. History of open total abdominal hysterectomy now with incisional hernia. She was to see Dr. Neil Wise as an outpatient for hernia evaluation however has not yet been seen by him. Denies tobacco use or alcohol use. States she sometimes walks with a cane or walker. Able to walk up a flight of stairs without dyspnea but does have some dizziness and have to stop and rest.    Abdominal ultrasound in [place] showed cholelithiasis with gallbladder wall thickening. Leukocytosis 16,000. Afebrile. Basic chemistry and LFTs unremarkable.     Past Medical History:   Diagnosis Date    Allergic rhinitis     Cerebrovascular disease 2012    Cerebrovascular disease 2014    Diabetes ([address]     type 2    Hyperlipidemia     Hypertension     Irregular heart beat 2001    Myocardial infarct ([address] 2014    Neuropathy     right foot    Obesity     Sleep apnea     declines to to wear a CPAP    Thyroid disease     hyperthyroid    Weakness due to cerebrovascular accident (CVA) 2014    on right side       Past Surgical History:   Procedure Laterality Date    COLONOSCOPY  2000    COLONOSCOPY  2016    HYSTERECTOMY  2001    complete       Medications Prior to Admission    Prior to Admission medications    Medication Sig Start Date End Date Taking?  Authorizing Provider   Fish Oil-Krill Oil [facility] ADVANCED 4 IN 1 PO) Take by mouth   Yes Historical Provider, MD   isosorbide mononitrate (IMDUR) 30 MG extended release tablet Take 30 mg by mouth daily   Yes Historical Provider, MD   NONFORMULARY    Yes Historical Provider, MD   Semaglutide (OZEMPIC, 2 MG/DOSE, SC) Inject into the skin   Yes Historical Provider, MD   Ertugliflozin L-PyroglutamicAc (STEGLATRO PO) Take by mouth   Yes Historical Provider, MD   cephALEXin (KEFLEX) 500 MG capsule Take 500 mg by mouth 2 times daily 4/26/16   Historical Provider, MD   lidocaine (XYLOCAINE) 5 % ointment  4/21/16   Historical Provider, MD   metFORMIN (GLUCOPHAGE) 500 MG tablet Take 500 mg by mouth 3 times daily (with meals)    Historical Provider, MD   metoprolol (LOPRESSOR) 50 MG tablet Take 50 mg by mouth 2 times daily    Historical Provider, MD   amLODIPine (NORVASC) 10 MG tablet Take 10 mg by mouth daily    Historical Provider, MD   torsemide (DEMADEX) 20 MG tablet Take 20 mg by mouth daily    Historical Provider, MD   atorvastatin (LIPITOR) 20 MG tablet Take 20 mg by mouth daily    Historical Provider, MD   lisinopril (PRINIVIL;ZESTRIL) 20 MG tablet Take 20 mg by mouth daily    Historical Provider, MD   Multiple Vitamins-Minerals (THERAPEUTIC MULTIVITAMIN-MINERALS) tablet Take 1 tablet by mouth daily    Historical Provider, MD   Calcium-Vitamin D-Vitamin K (VIACTIV PO) Take 1 tablet by mouth daily    Historical Provider, MD   Biotin w/ Vitamins C & E (HAIR SKIN & NAILS GUMMIES PO) Take 2 tablets by mouth daily    Historical Provider, MD   aspirin 325 MG EC tablet Take 325 mg by mouth daily    Historical Provider, MD   Omega-3 Fatty Acids (FISH OIL CONCENTRATE PO) Take 1 capsule by mouth daily    Historical Provider, MD   aspirin-acetaminophen-caffeine ([address] [place] [address]) 250-250-65 MG per tablet Take 1 tablet by mouth every 6 hours as needed for Headaches    Historical Provider, MD       Allergies   Allergen Reactions    Latex Swelling and Rash    Doxycycline Itching and Nausea Only    Seasonal Other (See Comments)     Watery eyes, sneezing and runny nose    Adhesive Tape Dermatitis and Rash       Family History   Problem Relation Age of Onset    Diabetes Mother     High Blood Pressure Mother     High Cholesterol Mother     Heart Disease Father     Diabetes Father     High Blood Pressure Father     Diabetes Sister     Heart Disease Sister     High Blood Pressure Sister     High Blood Pressure Brother     Heart Disease Brother     Heart Attack Brother     Clotting Disorder Brother     Diabetes Maternal Aunt     Kidney Disease Maternal Aunt     Cancer Maternal Grandmother         ovarian    Diabetes Paternal Grandmother     Heart Disease Paternal Grandfather     Heart Disease Brother        Social History     Tobacco Use    Smoking status: Never Smoker    Smokeless tobacco: Never Used   Substance Use Topics    Alcohol use: No    Drug use: No         Review of Systems:   General ROS: negative  Hematological and Lymphatic ROS: negative  Respiratory ROS: no cough, shortness of breath, or wheezing  Cardiovascular ROS: no chest pain or dyspnea on exertion  Gastrointestinal ROS: see HPI  Genito-Urinary ROS: no dysuria, trouble voiding, or hematuria  Musculoskeletal ROS: right sided weakness from prior CVA  Neurological: hx of stroke with R hemiplegia  Psychiatric: normal mood, affect  Endocrine: obese, hx of DM      PHYSICAL EXAM:    Vitals:    05/06/22 0500   BP: 134/84   Pulse: 88   Resp: 17   Temp: 98.5 °F (36.9 °C)   SpO2: 98%       GENERAL:  Sitting on edge of bed holding emesis bag. A&Ox3. HEAD:  Normocephalic. Atraumatic. EYES:   No scleral icterus. PERRL. LUNGS:  No increased work of breathing.   CARDIOVASCULAR: Regular rate  ABDOMEN:  Obese, Soft, non-distended, non-tender. No guarding, rigidity, rebound. Low midline scar from prior hysterectomy, +incisional hernia not reducible at umbilicus, non-tender  EXTREMITIES:   MAEx4. Atraumatic. No LE edema. SKIN:  Warm and dry, no rashes or lesions  NEUROLOGIC:  Baseline mild right sided weakness      LABS:    CBC  Recent Labs     05/06/22  0521   WBC 15.1*   HGB 12.7   HCT 41.1        BMP  Recent Labs     05/06/22  0521      K 4.4      CO2 24   BUN 10   CREATININE 0.8   CALCIUM 8.8     Liver Function  Recent Labs     05/05/22  1555   LIPASE 22   BILITOT 0.7   AST 15   ALT 16   ALKPHOS 57   PROT 7.6   LABALBU 4.2     No results for input(s): LACTATE in the last 72 hours. No results for input(s): INR, PTT in the last 72 hours. Invalid input(s): PT    RADIOLOGY    CT ABDOMEN PELVIS W WO CONTRAST Additional Contrast? None    Result Date: 5/5/2022  EXAMINATION: CT OF THE ABDOMEN AND PELVIS WITH AND WITHOUT CONTRAST 5/5/2022 4:58 pm TECHNIQUE: CT of the abdomen and pelvis was performed with and without the administration of intravenous contrast.  Multiplanar reformatted images are provided for review. Dose modulation, iterative reconstruction, and/or weight based adjustment of the mA/kV was utilized to reduce the radiation dose to as low as reasonably achievable. COMPARISON: None. HISTORY: ORDERING SYSTEM PROVIDED HISTORY: abdominal pain, leukocytosis and elevated lactate TECHNOLOGIST PROVIDED HISTORY: Reason for exam:->abdominal pain, leukocytosis and elevated lactate Additional Contrast?->None FINDINGS: There is excreted contrast within the urinary tract on precontrast images suggesting contrast administration for a recent examination, possibly at a different institution. This limits the evaluation for urolithiasis. There is masslike contour along the inferior pole of the right kidney with homogeneous enhancement compared to normal renal cortex.   This measures approximately 4.6 x 3.7 x 3.1 cm in size. There is a possible small cyst in the upper pole of the left kidney. The liver, spleen, and pancreas are unremarkable. There is a fat density mass at the left adrenal gland which is difficult to accurately measure. However, this measures approximately 4.4 cm in size and may represent lipoma or myelolipoma. The right adrenal gland is unremarkable in appearance. There is cholelithiasis. There is gallbladder wall thickening and stranding of the fat adjacent to the gallbladder. There is no evidence of biliary ductal dilatation. There is no evidence of bowel obstruction, pneumoperitoneum, or ascites. There are lipomas at the hepatic flexure of the colon. There are multiple ventral hernias. There is protrusion of a small bowel loop within the mouth of a right periumbilical hernia. There is no strangulation or obstruction. There is arteriosclerosis without abdominal aortic aneurysm. There is linear atelectasis and/or scarring in the bilateral lung bases, right greater than left. There are degenerative changes within the spine. 1. Cholelithiasis with suggestion of acute cholecystitis. 2. Lobular masslike contour at the inferior pole of the right kidney is nearly isodense to normal renal cortex. This is suspicious for enhancing mass. Recommend further characterization with MRI without and with intravenous contrast. 3. Left adrenal lipoma or myelolipoma. 4. Multiple ventral hernias. XR CHEST PORTABLE    Result Date: 5/5/2022  EXAMINATION: ONE XRAY VIEW OF THE CHEST 5/5/2022 4:12 pm COMPARISON: None. HISTORY: ORDERING SYSTEM PROVIDED HISTORY: abd pain and vomiting TECHNOLOGIST PROVIDED HISTORY: Reason for exam:->abd pain and vomiting FINDINGS: The lungs are without acute focal process. There is no effusion or pneumothorax. The cardiomediastinal silhouette is without acute process. The osseous structures are without acute process. No acute process.      US ABDOMEN LIMITED    Result Date: 5/5/2022  EXAMINATION: RIGHT UPPER QUADRANT ULTRASOUND 5/5/2022 4:17 pm COMPARISON: None. HISTORY: ORDERING SYSTEM PROVIDED HISTORY: RUQ pain, r/o cholecystitis TECHNOLOGIST PROVIDED HISTORY: Reason for exam:->RUQ pain, r/o cholecystitis What reading provider will be dictating this exam?->CRC FINDINGS: LIVER:  The liver is enlarged, measuring 19.5 cm in length. Its echotexture is diffusely increased, consistent with fatty infiltration. No focal mass is seen within it. BILIARY SYSTEM:  The gallbladder is hydropic, measuring 11.7 cm in length by 5.0 cm in width. It contains intraluminal calculi, as well as a small amount of sludge. There is thickening of the gallbladder wall to 0.5 cm. The ultrasound technologist reports a negative Olguin sign. Regardless, the presence of cholecystitis cannot be excluded. Clinical correlation is recommended. Radionuclide medicine hepatobiliary imaging can be performed considered. There is minimal dilatation of the common bile duct to 0.7 cm. Choledocholithiasis cannot be excluded. MRCP can be considered for further evaluation. RIGHT KIDNEY: The right kidney measures 10.8 cm in length by 4.2 cm in AP diameter by 4.8 cm in width. Its echotexture is normal and there is no evidence of hydronephrosis. An exophytic lower pole solid-appearing mass with tiny calcification is noted, measuring 4.3 x 3.8 by 4.1 cm in size. Rule out renal cell carcinoma. CT scan of the abdomen and pelvis with and without intravenous contrast is recommended. No perinephric fluid is identified. PANCREAS:  Visualized portions of the pancreas are unremarkable. OTHER: No evidence of right upper quadrant ascites. A right pleural effusion is noted. 1.  Hepatomegaly with fatty infiltration. 2.  Hydropic gallbladder with cholelithiasis and sludge. Gallbladder wall thickening is also seen. Rule out cholecystitis. Clinical correlation is recommended.   Radionuclide medicine hepatobiliary imaging can be considered for further evaluation. 3.  Minimal dilatation of the CBD to 0.7 cm. Rule out choledocholithiasis. MRCP can be considered for further evaluation. 4.  Solid right renal mass. Rule out renal cell carcinoma. CT scan of the abdomen and pelvis with and without intravenous contrast is recommended. 5.  Right pleural effusion. ASSESSMENT/PLAN:  61 y.o. female with acute RUQ pain with intractable nausea,     Repeat LFTs and bilirubin this am  Obtain HIDA to confirm cholecystitis  NPO pending HIDA, then ok for clear liquids  IVFs while NPO  Prn antiemetics and multimodal pain therapy  Recommend Cardiology consult for pre-operative risk assessment   Hold antigoags/antiplatelets - ok for DVT ppx  If HIDA positive, tentative cholecystectomy 5/9 assuming she is otherwise medically appropriate. Plan discussed with Dr. Elizabeth Watts.     Payal Thurman DO  Resident, PGY-3  5/6/2022  8:37 AM

## 2022-05-06 NOTE — PROGRESS NOTES
Physical Therapy  Name: Naya Benson  Room: 1600/0130-W  Date: 05/06/22    PT consult received and appreciated. Attempted PT eval, pt was sitting EOB and requested PT come back at a different time as she was nauseated and dry heaving. Will follow and complete at a later time and date as able.      Radha Fournier, PT, DPT  [id number]

## 2022-05-06 NOTE — CARE COORDINATION
Will defer Cardiology consultation to [facility] [address] [place] [address] as patient follows with Dr. Gautam Britton.   Electronically signed by WILL Masters CNP on 5/6/22 at 10:31 AM EDT

## 2022-05-06 NOTE — ED NOTES
Report called to 6W to [address] [address] [facility]. Advised that PAS was just leaving with patient.       Amanda Cam RN  05/06/22 3091

## 2022-05-06 NOTE — PROGRESS NOTES
Occupational Therapy    OT order received. Chart reviewed. Per gen surg note, await surgical vs conservative intervention. Will re-attempt eval when appropriate.      NIRAV Ludwig, OTR/L [id number]

## 2022-05-06 NOTE — CONSULTS
[facility] cardiology/the [facility] of [address] [place] [address]    Name: Dany Farah    Age: 61 y.o. Date of Admission: 5/5/2022  3:16 PM    Date of Service: 5/6/2022    Reason for Consultation: Preoperative cardiovascular assessment prior to possible laparoscopic cholecystectomy. Referring Physician:     History of Present Illness: The patient is a 61y.o. year old female admitted with about 8-hour history of diffuse abdominal discomfort and had a similar episode of symptoms about a month ago that occurred off and on for a week. She came to the hospital as she was concerned that her abdomen was continue to hurt and was gone to get worse as she had a month ago. She denies any recent exertional chest pain syncope or congestive heart failure symptoms. She does a limited amount of housework at home and denies any exertional limitations. No distinct chest pain or syncope. Past Medical History: Lexiscan stress test October 2019 LVEF 55% no ischemia or infarction. DM for 30 years, HTN, hyperlipidemia, TIM for probably 20 years but has never been able to get used to wearing CPAP or BiPAP mask by her report. Prior stroke in 2012 and another stroke 2014 with some residual right-sided weakness. Morbid obesity. Past surgical history: Abdominal hysterectomy 2001      Review of Systems:     Constitutional: No fever, chills, sweats  Cardiac: As per HPI  Pulmonary: No cough, wheeze, hemoptysis  HEENT: No visual disturbances or difficult swallowing  GI: No nausea, vomiting, diarrhea, abdominal pain, rectal bleeding  : No dysuria or hematuria  Endocrine: No excessive thirst, heat or cold intolerance. Musculoskeletal: No joint pain or muscle aches.  No claudication  Skin: No skin breakdown or rashes  Neuro: No headache, confusion, or seizures  Psych: No depression, anxiety      Family History:  Family History   Problem Relation Age of Onset    Diabetes Mother     High Blood Pressure Mother     High Cholesterol Mother     Heart Disease Father     Diabetes Father     High Blood Pressure Father     Diabetes Sister     Heart Disease Sister     High Blood Pressure Sister     High Blood Pressure Brother     Heart Disease Brother     Heart Attack Brother     Clotting Disorder Brother     Diabetes Maternal Aunt     Kidney Disease Maternal Aunt     Cancer Maternal Grandmother         ovarian    Diabetes Paternal Grandmother     Heart Disease Paternal Grandfather     Heart Disease Brother        Social History:  Social History     Socioeconomic History    Marital status: Single     Spouse name: Not on file    Number of children: Not on file    Years of education: Not on file    Highest education level: Not on file   Occupational History    Not on file   Tobacco Use    Smoking status: Never Smoker    Smokeless tobacco: Never Used   Substance and Sexual Activity    Alcohol use: No    Drug use: No    Sexual activity: Not Currently   Other Topics Concern    Not on file   Social History Narrative    Not on file     Social Determinants of Health     Financial Resource Strain:     Difficulty of Paying Living Expenses: Not on file   Food Insecurity:     Worried About Running Out of Food in the Last Year: Not on file    López of Food in the Last Year: Not on file   Transportation Needs:     Lack of Transportation (Medical): Not on file    Lack of Transportation (Non-Medical):  Not on file   Physical Activity:     Days of Exercise per Week: Not on file    Minutes of Exercise per Session: Not on file   Stress:     Feeling of Stress : Not on file   Social Connections:     Frequency of Communication with Friends and Family: Not on file    Frequency of Social Gatherings with Friends and Family: Not on file    Attends Jehovah's witness Services: Not on file    Active Member of Clubs or Organizations: Not on file    Attends Club or Organization Meetings: Not on file    Marital Status: Not on file   Intimate Partner Violence:     Fear of Current or Ex-Partner: Not on file    Emotionally Abused: Not on file    Physically Abused: Not on file    Sexually Abused: Not on file   Housing Stability:     Unable to Pay for Housing in the Last Year: Not on file    Number of Himanshu in the Last Year: Not on file    Unstable Housing in the Last Year: Not on file       Allergies:   Allergies   Allergen Reactions    Latex Swelling and Rash    Doxycycline Itching and Nausea Only    Seasonal Other (See Comments)     Watery eyes, sneezing and runny nose    Adhesive Tape Dermatitis and Rash       Current Medications:  Current Facility-Administered Medications   Medication Dose Route Frequency Provider Last Rate Last Admin    sodium chloride flush 0.9 % injection 5-40 mL  5-40 mL IntraVENous 2 times per day April WILL Green - CNP   10 mL at 05/06/22 0810    sodium chloride flush 0.9 % injection 5-40 mL  5-40 mL IntraVENous PRN April WILL Green - CNP        0.9 % sodium chloride infusion   IntraVENous PRN April SARAH GreenN - TRUNG        enoxaparin Sodium (LOVENOX) injection 30 mg  30 mg SubCUTAneous BID April SARAH GreenN - TRUNG        ondansetron (ZOFRAN-ODT) disintegrating tablet 4 mg  4 mg Oral Q8H PRN April WILL Green - TRUNG        Or    ondansetron [facility]) injection 4 mg  4 mg IntraVENous Q6H PRN April WILL Green - CNP   4 mg at 05/06/22 0530    acetaminophen (TYLENOL) tablet 650 mg  650 mg Oral Q6H PRN April WILL Green - CNP        Or    acetaminophen (TYLENOL) suppository 650 mg  650 mg Rectal Q6H PRN April Peter, APRN - CNP        bisacodyl (DULCOLAX) suppository 10 mg  10 mg Rectal Daily PRN April SARAH GreenN - CNP        glucose (GLUTOSE) 40 % oral gel 15 g  15 g Oral PRN April SARAH GreenN - CNP        dextrose 50 % IV solution  12.5 g IntraVENous PRN April WILL Green CNP        glucagon (rDNA) injection 1 mg  1 mg IntraMUSCular PRN April WILL Green CNP        dextrose 5 % solution  100 mL/hr IntraVENous PRN April WILL Green CNP        0.9 % sodium chloride infusion   IntraVENous Continuous April WILL Green  mL/hr at 05/06/22 0536 New Bag at 05/06/22 0536    metronidazole (FLAGYL) 500 mg in 0.9% NaCl 100 mL IVPB premix  500 mg IntraVENous Q8H April WILL Green CNP   Stopped at 05/06/22 2898    amLODIPine (NORVASC) tablet 10 mg  10 mg Oral Daily April WILL Green CNP        aspirin EC tablet 325 mg  325 mg Oral Daily April WILL Green CNP        atorvastatin (LIPITOR) tablet 20 mg  20 mg Oral Nightly April WILL Green CNP        isosorbide mononitrate (IMDUR) extended release tablet 30 mg  30 mg Oral Daily April WILL Green CNP        lisinopril (PRINIVIL;ZESTRIL) tablet 20 mg  20 mg Oral Daily April WILL Green CNP        metoprolol tartrate (LOPRESSOR) tablet 50 mg  50 mg Oral BID April WILL Green CNP        therapeutic multivitamin-minerals 1 tablet  1 tablet Oral Daily April WILL Green CNP        torsemide [facility]) tablet 20 mg  20 mg Oral Daily April WILL Green CNP        insulin lispro (HUMALOG) injection vial 0-12 Units  0-12 Units SubCUTAneous TID [facility] April WILL Green CNP        insulin lispro (HUMALOG) injection vial 0-6 Units  0-6 Units SubCUTAneous Nightly April WILL Green CNP        cefTRIAXone (ROCEPHIN) 2,000 mg in sterile water 20 mL IV syringe  2,000 mg IntraVENous Q24H Chuyita Hartmann DO        famotidine (PEPCID) 20 mg in sodium chloride (PF) 10 mL injection  20 mg IntraVENous BID WILL Samuel CNP   20 mg at 05/05/22 1946      sodium chloride      dextrose      sodium chloride 100 mL/hr at 05/06/22 0530 Physical Exam:  BP (!) 151/71   Pulse 100   Temp 99.1 °F (37.3 °C)   Resp 18   Ht 5' 1\" (1.549 m)   Wt 235 lb (106.6 kg)   SpO2 98%   BMI 44.40 kg/m²   Weight change: Wt Readings from Last 3 Encounters:   05/05/22 235 lb (106.6 kg)   05/06/22 243 lb (110.2 kg)   04/29/16 278 lb 14.4 oz (126.5 kg)         General: Awake, alert, oriented x3, no acute distress  HEENT: Unremarkable  Neck: No JVD or bruits. Cardiac: Regular rate and rhythm, normal S1 and S2, no extra heart sounds, murmurs, heaves, thrills  Resp: Lungs clear without wheezing or crackles. No accessory muscle use or retraction  Abdomen: Diffuse mild tenderness on mild palpation, mild distended, no gross organomegaly or mass  Skin: Warm and dry, no cyanosis. Musculoskeletal: normal tone and strength in the upper and lower extremities bilaterally  Neuro: Grossly unremarkable  Psych: Cooperative, and normal affect    Intake/Output:    Intake/Output Summary (Last 24 hours) at 5/6/2022 1317  Last data filed at 5/6/2022 0214  Gross per 24 hour   Intake 2602.47 ml   Output --   Net 2602.47 ml     No intake/output data recorded. Laboratory Tests:  Lab Results   Component Value Date    CREATININE 0.8 05/06/2022    CREATININE 0.8 05/06/2022    BUN 10 05/06/2022    BUN 9 05/06/2022     05/06/2022     05/06/2022    K 4.4 05/06/2022    K 4.5 05/06/2022     05/06/2022     05/06/2022    CO2 24 05/06/2022    CO2 20 (L) 05/06/2022     No results for input(s): CKTOTAL, CKMB in the last 72 hours.     Invalid input(s): TROPONONI  No results found for: BNP  Lab Results   Component Value Date    WBC 15.1 05/06/2022    RBC 4.64 05/06/2022    HGB 12.7 05/06/2022    HCT 41.1 05/06/2022    MCV 88.6 05/06/2022    MCH 27.4 05/06/2022    MCHC 30.9 05/06/2022    RDW 15.2 05/06/2022     05/06/2022    MPV 10.0 05/06/2022     Recent Labs     05/05/22  1555 05/06/22  0521   ALKPHOS 57 55   ALT 16 14   AST 15 16   PROT 7.6 6.9   BILITOT 0.7 0. 7   BILIDIR  --  <0.2   LABALBU 4.2 3.6     No results found for: MG  No results found for: PROTIME, INR  No results found for: TSH  No components found for: CHLPL  No results found for: TRIG  No results found for: HDL  No results found for: LDLCALC  No results found for: INR    Admission ECG May 5, 2022 at 1529 personally reviewed by me revealed normal sinus rhythm heart rate 97  QTc 426. Personally reviewed her telemetry shows normal sinus rhythm heart rate in the 80s. ASSESSMENT / PLAN:    1. Preoperative cardiovascular assessment prior to 2 planned laparoscopic cholecystectomy probably not until Monday, May 9, 2022 is the patient conveyed to me. She denies any anginal or heart failure symptoms and has stable dyspnea on exertion but get again is morbidly obese and has TIM. Would suggest increased risk of atrial arrhythmias and has likely had sleep apnea in excess of 20 years by history. She had her abdominal hysterectomy 20 years ago and at that time they put her on BiPAP/CPAP in the hospital.    #2  Obstructive sleep apnea I would be cautious about over sedating her perioperatively as she certainly has an increased risk of obstructive airway issues, CO2 retention. Also increased risk of atrial arrhythmias perioperatively. Try to keep potassium 4 or greater and magnesium 2 or greater. Today potassium 4.5, creatinine 0.8 BUN 9 sodium 136. #3 diabetes longstanding and no clear-cut history of CAD but has had prior stroke. Continue to modify blood sugar and optimize wound healing postoperatively. #4 hypertension continue lisinopril 20 mg daily, metoprolol tartrate 50 mg twice a day. #5 cholecystitis and undergoing nuclear imaging today. Has been started on IV antibiotics. Yesterday white count 16.5 and today 15.1. Again would be cautious and avoid volume overloading her perioperatively.     #6 occasional dizziness and shortness of breath with exertion and have discussed with her to check blood pressure at home and suspect much of her exertional symptoms related to body habitus and weight with a BMI of 46. Please call if any concerns or questions.   Electronically signed by Lupe Moyer MD on 5/6/2022 at 1:17 PM

## 2022-05-07 LAB
ALBUMIN SERPL-MCNC: 3 G/DL (ref 3.5–5.2)
ALP BLD-CCNC: 48 U/L (ref 35–104)
ALT SERPL-CCNC: 10 U/L (ref 0–32)
ANION GAP SERPL CALCULATED.3IONS-SCNC: 14 MMOL/L (ref 7–16)
AST SERPL-CCNC: 11 U/L (ref 0–31)
BILIRUB SERPL-MCNC: 0.7 MG/DL (ref 0–1.2)
BUN BLDV-MCNC: 8 MG/DL (ref 6–23)
CALCIUM SERPL-MCNC: 8.8 MG/DL (ref 8.6–10.2)
CHLORIDE BLD-SCNC: 101 MMOL/L (ref 98–107)
CO2: 22 MMOL/L (ref 22–29)
CREAT SERPL-MCNC: 0.8 MG/DL (ref 0.5–1)
GFR AFRICAN AMERICAN: >60
GFR NON-AFRICAN AMERICAN: >60 ML/MIN/1.73
GLUCOSE BLD-MCNC: 134 MG/DL (ref 74–99)
HCT VFR BLD CALC: 40.8 % (ref 34–48)
HEMOGLOBIN: 13 G/DL (ref 11.5–15.5)
LACTIC ACID: 0.9 MMOL/L (ref 0.5–2.2)
MCH RBC QN AUTO: 27.7 PG (ref 26–35)
MCHC RBC AUTO-ENTMCNC: 31.9 % (ref 32–34.5)
MCV RBC AUTO: 86.8 FL (ref 80–99.9)
METER GLUCOSE: 117 MG/DL (ref 74–99)
METER GLUCOSE: 128 MG/DL (ref 74–99)
METER GLUCOSE: 144 MG/DL (ref 74–99)
METER GLUCOSE: 161 MG/DL (ref 74–99)
PDW BLD-RTO: 15.6 FL (ref 11.5–15)
PLATELET # BLD: 350 E9/L (ref 130–450)
PMV BLD AUTO: 10 FL (ref 7–12)
POTASSIUM SERPL-SCNC: 4.1 MMOL/L (ref 3.5–5)
RBC # BLD: 4.7 E12/L (ref 3.5–5.5)
SODIUM BLD-SCNC: 137 MMOL/L (ref 132–146)
TOTAL PROTEIN: 6.3 G/DL (ref 6.4–8.3)
URINE CULTURE, ROUTINE: NORMAL
WBC # BLD: 21.1 E9/L (ref 4.5–11.5)

## 2022-05-07 PROCEDURE — 80053 COMPREHEN METABOLIC PANEL: CPT

## 2022-05-07 PROCEDURE — 36415 COLL VENOUS BLD VENIPUNCTURE: CPT

## 2022-05-07 PROCEDURE — 2140000000 HC CCU INTERMEDIATE R&B

## 2022-05-07 PROCEDURE — 2500000003 HC RX 250 WO HCPCS: Performed by: NURSE PRACTITIONER

## 2022-05-07 PROCEDURE — 85027 COMPLETE CBC AUTOMATED: CPT

## 2022-05-07 PROCEDURE — 6370000000 HC RX 637 (ALT 250 FOR IP): Performed by: NURSE PRACTITIONER

## 2022-05-07 PROCEDURE — 6360000002 HC RX W HCPCS: Performed by: SURGERY

## 2022-05-07 PROCEDURE — 6360000002 HC RX W HCPCS: Performed by: NURSE PRACTITIONER

## 2022-05-07 PROCEDURE — 82962 GLUCOSE BLOOD TEST: CPT

## 2022-05-07 PROCEDURE — 2580000003 HC RX 258: Performed by: NURSE PRACTITIONER

## 2022-05-07 PROCEDURE — 83605 ASSAY OF LACTIC ACID: CPT

## 2022-05-07 PROCEDURE — 2580000003 HC RX 258: Performed by: SURGERY

## 2022-05-07 PROCEDURE — A4216 STERILE WATER/SALINE, 10 ML: HCPCS | Performed by: NURSE PRACTITIONER

## 2022-05-07 RX ADMIN — AMLODIPINE BESYLATE 10 MG: 10 TABLET ORAL at 09:01

## 2022-05-07 RX ADMIN — METOPROLOL TARTRATE 50 MG: 50 TABLET, FILM COATED ORAL at 22:05

## 2022-05-07 RX ADMIN — FAMOTIDINE 20 MG: 10 INJECTION INTRAVENOUS at 22:06

## 2022-05-07 RX ADMIN — METRONIDAZOLE 500 MG: 500 INJECTION, SOLUTION INTRAVENOUS at 14:19

## 2022-05-07 RX ADMIN — TORSEMIDE 20 MG: 20 TABLET ORAL at 08:59

## 2022-05-07 RX ADMIN — Medication 10 ML: at 22:07

## 2022-05-07 RX ADMIN — ENOXAPARIN SODIUM 30 MG: 100 INJECTION SUBCUTANEOUS at 09:14

## 2022-05-07 RX ADMIN — ATORVASTATIN CALCIUM 20 MG: 20 TABLET, FILM COATED ORAL at 22:06

## 2022-05-07 RX ADMIN — LISINOPRIL 20 MG: 20 TABLET ORAL at 09:00

## 2022-05-07 RX ADMIN — METRONIDAZOLE 500 MG: 500 INJECTION, SOLUTION INTRAVENOUS at 22:22

## 2022-05-07 RX ADMIN — CEFTRIAXONE 2000 MG: 2 INJECTION, POWDER, FOR SOLUTION INTRAMUSCULAR; INTRAVENOUS at 22:08

## 2022-05-07 RX ADMIN — ISOSORBIDE MONONITRATE 30 MG: 30 TABLET, EXTENDED RELEASE ORAL at 08:59

## 2022-05-07 RX ADMIN — Medication 1 TABLET: at 09:00

## 2022-05-07 RX ADMIN — METOPROLOL TARTRATE 50 MG: 50 TABLET, FILM COATED ORAL at 09:15

## 2022-05-07 RX ADMIN — FAMOTIDINE 20 MG: 10 INJECTION INTRAVENOUS at 09:10

## 2022-05-07 RX ADMIN — SODIUM CHLORIDE: 9 INJECTION, SOLUTION INTRAVENOUS at 23:37

## 2022-05-07 RX ADMIN — Medication 10 ML: at 09:15

## 2022-05-07 RX ADMIN — METRONIDAZOLE 500 MG: 500 INJECTION, SOLUTION INTRAVENOUS at 05:40

## 2022-05-07 RX ADMIN — ENOXAPARIN SODIUM 30 MG: 100 INJECTION SUBCUTANEOUS at 22:06

## 2022-05-07 ASSESSMENT — PAIN SCALES - GENERAL: PAINLEVEL_OUTOF10: 0

## 2022-05-07 NOTE — PROGRESS NOTES
GENERAL SURGERY  DAILY PROGRESS NOTE  5/7/2022    CHIEF COMPLAINT:  Chief Complaint   Patient presents with    Emesis     onset 2 am with n/v. Having dry heaves and LUQ, RLQ pain       SUBJECTIVE:  Pain about the same today denies nausea or vomiting. Tolerating liquids. OBJECTIVE:  /74   Pulse 111   Temp 98.8 °F (37.1 °C) (Oral)   Resp 21   Ht 5' 1\" (1.549 m)   Wt 235 lb (106.6 kg)   SpO2 94%   BMI 44.40 kg/m²     GENERAL:  NAD. A&Ox3. LUNGS:  No increased work of breathing. CARDIOVASCULAR: RR  ABDOMEN:  Soft, non-distended, RUQ -tender, + aguillon's. No guarding, rigidity, rebound. ASSESSMENT/PLAN:  61 y.o. female with acute RUQ pain with intractable nausea     Monitor LFTs and bilirubin   HIDA- suggestive of acute viktor  Prn antiemetics and multimodal pain therapy  Recommend Cardiology consult for pre-operative risk assessment- intermediate risk   Hold antigoags/antiplatelets - ok for DVT ppx  tentative cholecystectomy 5/9 assuming she is otherwise medically appropriate.     Daryl Giordano MD  Surgery Resident PGY-1  5/7/2022  7:45 AM

## 2022-05-08 LAB
HCT VFR BLD CALC: 36.6 % (ref 34–48)
HEMOGLOBIN: 11.3 G/DL (ref 11.5–15.5)
MCH RBC QN AUTO: 27.5 PG (ref 26–35)
MCHC RBC AUTO-ENTMCNC: 30.9 % (ref 32–34.5)
MCV RBC AUTO: 89.1 FL (ref 80–99.9)
METER GLUCOSE: 102 MG/DL (ref 74–99)
METER GLUCOSE: 111 MG/DL (ref 74–99)
METER GLUCOSE: 112 MG/DL (ref 74–99)
METER GLUCOSE: 117 MG/DL (ref 74–99)
PDW BLD-RTO: 15.7 FL (ref 11.5–15)
PLATELET # BLD: 328 E9/L (ref 130–450)
PMV BLD AUTO: 10.1 FL (ref 7–12)
RBC # BLD: 4.11 E12/L (ref 3.5–5.5)
WBC # BLD: 14 E9/L (ref 4.5–11.5)

## 2022-05-08 PROCEDURE — 6360000002 HC RX W HCPCS: Performed by: SURGERY

## 2022-05-08 PROCEDURE — A4216 STERILE WATER/SALINE, 10 ML: HCPCS | Performed by: NURSE PRACTITIONER

## 2022-05-08 PROCEDURE — 2580000003 HC RX 258: Performed by: NURSE PRACTITIONER

## 2022-05-08 PROCEDURE — 2580000003 HC RX 258: Performed by: SURGERY

## 2022-05-08 PROCEDURE — 36415 COLL VENOUS BLD VENIPUNCTURE: CPT

## 2022-05-08 PROCEDURE — 82962 GLUCOSE BLOOD TEST: CPT

## 2022-05-08 PROCEDURE — 2140000000 HC CCU INTERMEDIATE R&B

## 2022-05-08 PROCEDURE — 6360000002 HC RX W HCPCS: Performed by: NURSE PRACTITIONER

## 2022-05-08 PROCEDURE — 2500000003 HC RX 250 WO HCPCS: Performed by: NURSE PRACTITIONER

## 2022-05-08 PROCEDURE — 85027 COMPLETE CBC AUTOMATED: CPT

## 2022-05-08 PROCEDURE — 6370000000 HC RX 637 (ALT 250 FOR IP): Performed by: NURSE PRACTITIONER

## 2022-05-08 RX ADMIN — AMLODIPINE BESYLATE 10 MG: 10 TABLET ORAL at 09:22

## 2022-05-08 RX ADMIN — ENOXAPARIN SODIUM 30 MG: 100 INJECTION SUBCUTANEOUS at 09:22

## 2022-05-08 RX ADMIN — METOPROLOL TARTRATE 50 MG: 50 TABLET, FILM COATED ORAL at 09:22

## 2022-05-08 RX ADMIN — FAMOTIDINE 20 MG: 10 INJECTION INTRAVENOUS at 21:31

## 2022-05-08 RX ADMIN — Medication 10 ML: at 09:38

## 2022-05-08 RX ADMIN — Medication 1 TABLET: at 09:22

## 2022-05-08 RX ADMIN — METRONIDAZOLE 500 MG: 500 INJECTION, SOLUTION INTRAVENOUS at 12:55

## 2022-05-08 RX ADMIN — FAMOTIDINE 20 MG: 10 INJECTION INTRAVENOUS at 09:22

## 2022-05-08 RX ADMIN — CEFTRIAXONE 2000 MG: 2 INJECTION, POWDER, FOR SOLUTION INTRAMUSCULAR; INTRAVENOUS at 21:29

## 2022-05-08 RX ADMIN — SODIUM CHLORIDE: 9 INJECTION, SOLUTION INTRAVENOUS at 21:27

## 2022-05-08 RX ADMIN — ISOSORBIDE MONONITRATE 30 MG: 30 TABLET, EXTENDED RELEASE ORAL at 09:21

## 2022-05-08 RX ADMIN — TORSEMIDE 20 MG: 20 TABLET ORAL at 09:21

## 2022-05-08 RX ADMIN — ATORVASTATIN CALCIUM 20 MG: 20 TABLET, FILM COATED ORAL at 21:31

## 2022-05-08 RX ADMIN — METRONIDAZOLE 500 MG: 500 INJECTION, SOLUTION INTRAVENOUS at 21:50

## 2022-05-08 RX ADMIN — METOPROLOL TARTRATE 50 MG: 50 TABLET, FILM COATED ORAL at 21:37

## 2022-05-08 RX ADMIN — LISINOPRIL 20 MG: 20 TABLET ORAL at 09:22

## 2022-05-08 RX ADMIN — METRONIDAZOLE 500 MG: 500 INJECTION, SOLUTION INTRAVENOUS at 05:38

## 2022-05-08 NOTE — PROGRESS NOTES
GENERAL SURGERY  DAILY PROGRESS NOTE  5/8/2022    CHIEF COMPLAINT:  Chief Complaint   Patient presents with    Emesis     onset 2 am with n/v. Having dry heaves and LUQ, RLQ pain       SUBJECTIVE:  Some right upper quadrant pain this morning. She says it is stable compared to yesterday. Planning for or tomorrow. OBJECTIVE:  /65   Pulse 84   Temp 98.3 °F (36.8 °C) (Oral)   Resp 17   Ht 5' 1\" (1.549 m)   Wt 235 lb (106.6 kg)   SpO2 95%   BMI 44.40 kg/m²     GENERAL:  NAD.   Lying in bed, responds to questions appropriately  LUNGS: Symmetric chest rise, no audible wheezing  CARDIOVASCULAR: Warm throughout  ABDOMEN:  Soft, obese, tenderness palpation of the right hemiabdomen worse at the right upper quadrant without rebound or guarding, positive Olguin sign    ASSESSMENT/PLAN:  61 y.o. female with acute RUQ pain with intractable nausea     Plan for laparoscopic robot-assisted cholecystectomy tomorrow  Consult urology for evaluation for possible mass -we will see whether or not they want to do anything in the operating room tomorrow  N.p.o. at midnight  IV fluids  Supportive care  Ambulate as able  Pulmonary hygiene    Please page with any questions or concerns    Shonda Atwood MD  PGY-4

## 2022-05-08 NOTE — PROGRESS NOTES
[facility]   Progress note      Subjective: The patient is awake and alert. Resting comfortably no apparent acute distress  Indicates she still having intermittent abdominal pain  Remains n.p.o. Objective:    /61   Pulse 87   Temp 97.8 °F (36.6 °C) (Oral)   Resp 18   Ht 5' 1\" (1.549 m)   Wt 235 lb (106.6 kg)   SpO2 95%   BMI 44.40 kg/m²     In: 400 [P.O.:200; I.V.:200]  Out: 450   In: 400   Out: 450 [Urine:450]    General appearance: NAD, conversant  HEENT: AT/NC, MMM  Neck: FROM, supple  Lungs: Clear to auscultation  CV: RRR, no MRGs  Vasc: Radial pulses 2+  Abdomen: Soft, non-tender; no masses or HSM  Extremities: No peripheral edema or digital cyanosis  Skin: no rash, lesions or ulcers  Psych: Alert and oriented to person, place and time  Neuro: Alert and interactive     Recent Labs     05/05/22  1555 05/06/22  0521 05/07/22  0550   WBC 16.5* 15.1* 21.1*   HGB 14.1 12.7 13.0   HCT 43.2 41.1 40.8   * 366 350       Recent Labs     05/05/22  1555 05/06/22  0521 05/07/22  0550    136  136 137   K 4.5 4.5  4.4 4.1   CL 98 100  101 101   CO2 24 20*  24 22   BUN 18 9  10 8   CREATININE 1.0 0.8  0.8 0.8   CALCIUM 9.7 8.8  8.8 8.8       Assessment:    Principal Problem:    Acute cholecystitis  Active Problems:    TIM (obstructive sleep apnea)    Type 2 diabetes mellitus, without long-term current use of insulin (HCC)    Primary hypertension    Lactic acid acidosis    Renal mass    Hepatomegaly    Pleural effusion on right    Leukocytosis  Resolved Problems:    * No resolved hospital problems.  *      Plan:    Patient is a 79-year-old female admitted to [facility] for  Acute cholecystitis  -Monitor labs, CBC  -WBC 16.5 >15.1-is increased to 21,000 today  -General Surgery  following plan for cholecystectomy Monday or Tuesday  -US RUQ: Hydropic gallbladder with wall thickening  -NM HIDA scan: Pending, if positive tentative cholecystectomy 5/9  -IV Rocephin and Flagyl-continue for now  -IVF   -NPO  -Cardiology consulted for preoperative cardiovascular assessment- cleared     DM type II  -Monitor labs  -ISS glucose control  -Hypoglycemia protocol initiated     New right kidney mass  -MRI abdomen completed 5/6/2022 with soft tissue intensity mass lesion concerning for malignancy  -Monitor kidney function-within normal limits   urology consult or IR directed biopsy of mass     Hypertension  -continue home medications     Right Pleural effusion   -monitor respiratory status   -obtai CXR if respiratory status changes     DVT Prophylaxis   PT/OT  Discharge planning           Reinier Solomon MD  8:40 PM  5/7/2022

## 2022-05-08 NOTE — CONSULTS
INPATIENT CONSULTATION RECORD FOR  5/8/2022      [facility]  [address] [place], [address]  [phone]        REASON FOR CONSULTATION:      4.3 cm right lower pole renal mass    HISTORY OF PRESENT ILLNESS:      The patient is a 61 y.o. female patient who presents with acute cholecystitis. Renal mass incidentally discovered on CT scan. History of DM, ventral hernia, cerebrovascular disease, obesity, TIM, prior MI.        Past Medical History:   Diagnosis Date    Allergic rhinitis     Cerebrovascular disease 2012    Cerebrovascular disease 2014    Diabetes ([facility] [address]     type 2    Hyperlipidemia     Hypertension     Irregular heart beat 2001    Myocardial infarct ([facility] [address] 2014    Neuropathy     right foot    Obesity     Sleep apnea     declines to to wear a CPAP    Thyroid disease     hyperthyroid    Weakness due to cerebrovascular accident (CVA) 2014    on right side         Past Surgical History:   Procedure Laterality Date    COLONOSCOPY  2000    COLONOSCOPY  2016    HYSTERECTOMY  2001    complete       Medications Prior to Admission:    Medications Prior to Admission: Fish Oil-Krill Oil (MEGARED ADVANCED 4 IN 1 PO), Take by mouth  isosorbide mononitrate (IMDUR) 30 MG extended release tablet, Take 30 mg by mouth daily  NONFORMULARY,   Semaglutide (OZEMPIC, 2 MG/DOSE, SC), Inject into the skin  Ertugliflozin L-PyroglutamicAc (STEGLATRO PO), Take by mouth  cephALEXin (KEFLEX) 500 MG capsule, Take 500 mg by mouth 2 times daily  lidocaine (XYLOCAINE) 5 % ointment,   metFORMIN (GLUCOPHAGE) 500 MG tablet, Take 500 mg by mouth 3 times daily (with meals)  metoprolol (LOPRESSOR) 50 MG tablet, Take 50 mg by mouth 2 times daily  amLODIPine (NORVASC) 10 MG tablet, Take 10 mg by mouth daily  torsemide (DEMADEX) 20 MG tablet, Take 20 mg by mouth daily  atorvastatin (LIPITOR) 20 MG tablet, Take 20 mg by mouth daily  lisinopril (PRINIVIL;ZESTRIL) 20 MG tablet, Take 20 mg by mouth daily  Multiple Vitamins-Minerals (THERAPEUTIC MULTIVITAMIN-MINERALS) tablet, Take 1 tablet by mouth daily  Calcium-Vitamin D-Vitamin K (VIACTIV PO), Take 1 tablet by mouth daily  Biotin w/ Vitamins C & E (HAIR SKIN & NAILS GUMMIES PO), Take 2 tablets by mouth daily  aspirin 325 MG EC tablet, Take 325 mg by mouth daily  Omega-3 Fatty Acids (FISH OIL CONCENTRATE PO), Take 1 capsule by mouth daily  aspirin-acetaminophen-caffeine (EXCEDRIN MIGRAINE) 250-250-65 MG per tablet, Take 1 tablet by mouth every 6 hours as needed for Headaches    Allergies:    Latex, Doxycycline, Seasonal, and Adhesive tape    Social History:    reports that she has never smoked. She has never used smokeless tobacco. She reports that she does not drink alcohol and does not use drugs. Family History:   Non-contributory to this Urological problem  family history includes Cancer in her maternal grandmother; Clotting Disorder in her brother; Diabetes in her father, maternal aunt, mother, paternal grandmother, and sister; Heart Attack in her brother; Heart Disease in her brother, brother, father, paternal grandfather, and sister; High Blood Pressure in her brother, father, mother, and sister; High Cholesterol in her mother; Kidney Disease in her maternal aunt. REVIEW OF SYSTEMS:  Respiratory: negative for cough and hemoptysis  Cardiovascular: negative for chest pain and dyspnea  Gastrointestinal: Positive for abdominal pain, nausea  Derm: negative for rash and skin lesion(s)  Neurological: negative for seizures and tremors  Endocrine: negative for diabetic symptoms including polydipsia and polyuria  : As above in the HPI, otherwise negative  All other systems negative    PHYSICAL EXAM:    Vitals:  /65   Pulse 84   Temp 98.3 °F (36.8 °C) (Oral)   Resp 17   Ht 5' 1\" (1.549 m)   Wt 235 lb (106.6 kg)   SpO2 95%   BMI 44.40 kg/m²     General:  Awake, alert, oriented X 3. Well developed, well nourished, well groomed.   No apparent distress. HEENT:  Normocephalic, atraumatic. Pupils equal, round. No scleral icterus. No conjunctival injection. Normal lips, teeth, and gums. No nasal discharge. Neck:  Supple, no masses. Heart:  RRR  Lungs:  No audible wheezing. Respirations symmetric and non-labored. Abdomen:  soft, tender, no masses, no organomegaly, no peritoneal signs  Extremities:  No clubbing, cyanosis, or edema  Skin:  Warm and dry, no open lesions or rashes  Neuro:  Cranial nerves 2-12 intact, no focal deficits  Rectal: deferred  Genitalia:  deferred    LABS:    Lab Results   Component Value Date    WBC 14.0 (H) 05/08/2022    HGB 11.3 (L) 05/08/2022    HCT 36.6 05/08/2022    MCV 89.1 05/08/2022     05/08/2022       Lab Results   Component Value Date    CREATININE 0.8 05/07/2022       No results found for: PSA    Lab Results   Component Value Date    LABURIN Growth not present 05/05/2022       Lab Results   Component Value Date    BC 24 Hours no growth 05/05/2022       Lab Results   Component Value Date    BLOODCULT2 24 Hours no growth 05/05/2022                  ASSESSMENT / PLAN:      1. 4.3 cm right lower pole renal mass incidentally discovered on CT scan for gall bladder issues. This is highly suspicious for renal cell carcinoma. Her acute gallbladder issues are the priority in her care. I discussed this mass with Zakiya Hull today including possible etiologies. In light of her complex abdominal anatomy including ventral hernias, obesity and in light of her poor overall health will refer to specialist in [facility] for possible retroperitoneoscopic partial nephrectomy. Our office will handle this referral.  The patient was informed to watch her phone for call from them when she returns to home. She understands that this is suspicious for malignancy and the importance of following up with this.       Che Govea MD, MESTEFANI.  8:55 AM  5/8/2022

## 2022-05-08 NOTE — PROGRESS NOTES
and Flagyl-continue for now-  -IVF   -NPO  -Cardiology consulted for preoperative cardiovascular assessment- cleared to proceed with core intervention as needed     DM type II  -Monitor labs  -ISS glucose control  -Hypoglycemia protocol initiated     New right kidney mass-concerning for renal cell carcinoma  -MRI abdomen completed 5/6/2022 with soft tissue intensity mass lesion concerning for malignancy  -Monitor kidney function-within normal limits   urology consult appreciated-recommendation noted for follow-up in [facility] [employer] [facility] for retroperitoneoscopic partial nephrectomy given her overall poor health and multiple comorbidities  This is to be done after patient undergoes surgery gallbladder surgery     Hypertension  -continue home medications     Right Pleural effusion   -monitor respiratory status   -obtai CXR if respiratory status changes     DVT Prophylaxis   PT/OT  Discharge planning           Alli Garcia MD  3:33 PM  5/8/2022

## 2022-05-09 ENCOUNTER — ANESTHESIA EVENT (OUTPATIENT)
Dept: OPERATING ROOM | Age: 67
DRG: 418 | End: 2022-05-09
Payer: MEDICARE

## 2022-05-09 ENCOUNTER — ANESTHESIA (OUTPATIENT)
Dept: OPERATING ROOM | Age: 67
DRG: 418 | End: 2022-05-09
Payer: MEDICARE

## 2022-05-09 VITALS — OXYGEN SATURATION: 93 % | SYSTOLIC BLOOD PRESSURE: 123 MMHG | DIASTOLIC BLOOD PRESSURE: 65 MMHG

## 2022-05-09 LAB
ABO/RH: NORMAL
ALBUMIN SERPL-MCNC: 3.2 G/DL (ref 3.5–5.2)
ALP BLD-CCNC: 230 U/L (ref 35–104)
ALT SERPL-CCNC: 171 U/L (ref 0–32)
ANION GAP SERPL CALCULATED.3IONS-SCNC: 19 MMOL/L (ref 7–16)
ANTIBODY SCREEN: NORMAL
AST SERPL-CCNC: 120 U/L (ref 0–31)
BILIRUB SERPL-MCNC: 0.6 MG/DL (ref 0–1.2)
BILIRUBIN DIRECT: 0.3 MG/DL (ref 0–0.3)
BILIRUBIN, INDIRECT: 0.3 MG/DL (ref 0–1)
BUN BLDV-MCNC: 14 MG/DL (ref 6–23)
CALCIUM SERPL-MCNC: 8.8 MG/DL (ref 8.6–10.2)
CHLORIDE BLD-SCNC: 102 MMOL/L (ref 98–107)
CO2: 19 MMOL/L (ref 22–29)
CREAT SERPL-MCNC: 0.8 MG/DL (ref 0.5–1)
GFR AFRICAN AMERICAN: >60
GFR NON-AFRICAN AMERICAN: >60 ML/MIN/1.73
GLUCOSE BLD-MCNC: 104 MG/DL (ref 74–99)
HCT VFR BLD CALC: 39.7 % (ref 34–48)
HEMOGLOBIN: 12.3 G/DL (ref 11.5–15.5)
MCH RBC QN AUTO: 27.2 PG (ref 26–35)
MCHC RBC AUTO-ENTMCNC: 31 % (ref 32–34.5)
MCV RBC AUTO: 87.6 FL (ref 80–99.9)
METER GLUCOSE: 108 MG/DL (ref 74–99)
METER GLUCOSE: 112 MG/DL (ref 74–99)
METER GLUCOSE: 93 MG/DL (ref 74–99)
PDW BLD-RTO: 15.3 FL (ref 11.5–15)
PLATELET # BLD: 404 E9/L (ref 130–450)
PMV BLD AUTO: 9.5 FL (ref 7–12)
POTASSIUM SERPL-SCNC: 4 MMOL/L (ref 3.5–5)
RBC # BLD: 4.53 E12/L (ref 3.5–5.5)
SODIUM BLD-SCNC: 140 MMOL/L (ref 132–146)
TOTAL PROTEIN: 7.1 G/DL (ref 6.4–8.3)
WBC # BLD: 9.1 E9/L (ref 4.5–11.5)

## 2022-05-09 PROCEDURE — 6370000000 HC RX 637 (ALT 250 FOR IP): Performed by: ANESTHESIOLOGY

## 2022-05-09 PROCEDURE — 2500000003 HC RX 250 WO HCPCS: Performed by: NURSE ANESTHETIST, CERTIFIED REGISTERED

## 2022-05-09 PROCEDURE — 2500000003 HC RX 250 WO HCPCS: Performed by: NURSE PRACTITIONER

## 2022-05-09 PROCEDURE — 80076 HEPATIC FUNCTION PANEL: CPT

## 2022-05-09 PROCEDURE — 85027 COMPLETE CBC AUTOMATED: CPT

## 2022-05-09 PROCEDURE — 86900 BLOOD TYPING SEROLOGIC ABO: CPT

## 2022-05-09 PROCEDURE — 3700000000 HC ANESTHESIA ATTENDED CARE: Performed by: SURGERY

## 2022-05-09 PROCEDURE — 2140000000 HC CCU INTERMEDIATE R&B

## 2022-05-09 PROCEDURE — 3600000003 HC SURGERY LEVEL 3 BASE: Performed by: SURGERY

## 2022-05-09 PROCEDURE — 2580000003 HC RX 258: Performed by: NURSE PRACTITIONER

## 2022-05-09 PROCEDURE — 0FB44ZZ EXCISION OF GALLBLADDER, PERCUTANEOUS ENDOSCOPIC APPROACH: ICD-10-PCS | Performed by: SURGERY

## 2022-05-09 PROCEDURE — 3600000013 HC SURGERY LEVEL 3 ADDTL 15MIN: Performed by: SURGERY

## 2022-05-09 PROCEDURE — 6370000000 HC RX 637 (ALT 250 FOR IP): Performed by: NURSE PRACTITIONER

## 2022-05-09 PROCEDURE — 2720000010 HC SURG SUPPLY STERILE: Performed by: SURGERY

## 2022-05-09 PROCEDURE — 94664 DEMO&/EVAL PT USE INHALER: CPT

## 2022-05-09 PROCEDURE — 3700000001 HC ADD 15 MINUTES (ANESTHESIA): Performed by: SURGERY

## 2022-05-09 PROCEDURE — 86850 RBC ANTIBODY SCREEN: CPT

## 2022-05-09 PROCEDURE — 86901 BLOOD TYPING SEROLOGIC RH(D): CPT

## 2022-05-09 PROCEDURE — 2700000000 HC OXYGEN THERAPY PER DAY

## 2022-05-09 PROCEDURE — 6360000002 HC RX W HCPCS: Performed by: SURGERY

## 2022-05-09 PROCEDURE — 6360000002 HC RX W HCPCS: Performed by: NURSE ANESTHETIST, CERTIFIED REGISTERED

## 2022-05-09 PROCEDURE — 7100000000 HC PACU RECOVERY - FIRST 15 MIN: Performed by: SURGERY

## 2022-05-09 PROCEDURE — 36415 COLL VENOUS BLD VENIPUNCTURE: CPT

## 2022-05-09 PROCEDURE — 7100000001 HC PACU RECOVERY - ADDTL 15 MIN: Performed by: SURGERY

## 2022-05-09 PROCEDURE — 2500000003 HC RX 250 WO HCPCS: Performed by: SURGERY

## 2022-05-09 PROCEDURE — 88304 TISSUE EXAM BY PATHOLOGIST: CPT

## 2022-05-09 PROCEDURE — 2709999900 HC NON-CHARGEABLE SUPPLY: Performed by: SURGERY

## 2022-05-09 PROCEDURE — A4216 STERILE WATER/SALINE, 10 ML: HCPCS | Performed by: NURSE PRACTITIONER

## 2022-05-09 PROCEDURE — 2580000003 HC RX 258: Performed by: NURSE ANESTHETIST, CERTIFIED REGISTERED

## 2022-05-09 PROCEDURE — 97161 PT EVAL LOW COMPLEX 20 MIN: CPT

## 2022-05-09 PROCEDURE — 2580000003 HC RX 258: Performed by: SURGERY

## 2022-05-09 PROCEDURE — 82962 GLUCOSE BLOOD TEST: CPT

## 2022-05-09 PROCEDURE — 6360000002 HC RX W HCPCS: Performed by: ANESTHESIOLOGY

## 2022-05-09 PROCEDURE — 80048 BASIC METABOLIC PNL TOTAL CA: CPT

## 2022-05-09 RX ORDER — LABETALOL HYDROCHLORIDE 5 MG/ML
5 INJECTION, SOLUTION INTRAVENOUS
Status: DISCONTINUED | OUTPATIENT
Start: 2022-05-09 | End: 2022-05-10 | Stop reason: HOSPADM

## 2022-05-09 RX ORDER — MIDAZOLAM HYDROCHLORIDE 1 MG/ML
INJECTION INTRAMUSCULAR; INTRAVENOUS PRN
Status: DISCONTINUED | OUTPATIENT
Start: 2022-05-09 | End: 2022-05-09 | Stop reason: SDUPTHER

## 2022-05-09 RX ORDER — IPRATROPIUM BROMIDE AND ALBUTEROL SULFATE 2.5; .5 MG/3ML; MG/3ML
1 SOLUTION RESPIRATORY (INHALATION)
Status: COMPLETED | OUTPATIENT
Start: 2022-05-09 | End: 2022-05-09

## 2022-05-09 RX ORDER — LIDOCAINE HYDROCHLORIDE AND EPINEPHRINE 10; 10 MG/ML; UG/ML
INJECTION, SOLUTION INFILTRATION; PERINEURAL PRN
Status: DISCONTINUED | OUTPATIENT
Start: 2022-05-09 | End: 2022-05-09 | Stop reason: ALTCHOICE

## 2022-05-09 RX ORDER — FENTANYL CITRATE 50 UG/ML
INJECTION, SOLUTION INTRAMUSCULAR; INTRAVENOUS PRN
Status: DISCONTINUED | OUTPATIENT
Start: 2022-05-09 | End: 2022-05-09 | Stop reason: SDUPTHER

## 2022-05-09 RX ORDER — INDOCYANINE GREEN AND WATER 25 MG
5 KIT INJECTION ONCE
Status: DISCONTINUED | OUTPATIENT
Start: 2022-05-09 | End: 2022-05-13 | Stop reason: HOSPADM

## 2022-05-09 RX ORDER — HYDRALAZINE HYDROCHLORIDE 20 MG/ML
5 INJECTION INTRAMUSCULAR; INTRAVENOUS
Status: DISCONTINUED | OUTPATIENT
Start: 2022-05-09 | End: 2022-05-10 | Stop reason: HOSPADM

## 2022-05-09 RX ORDER — ONDANSETRON 2 MG/ML
INJECTION INTRAMUSCULAR; INTRAVENOUS PRN
Status: DISCONTINUED | OUTPATIENT
Start: 2022-05-09 | End: 2022-05-09 | Stop reason: SDUPTHER

## 2022-05-09 RX ORDER — FAMOTIDINE 20 MG/1
20 TABLET, FILM COATED ORAL 2 TIMES DAILY
Status: DISCONTINUED | OUTPATIENT
Start: 2022-05-09 | End: 2022-05-13 | Stop reason: HOSPADM

## 2022-05-09 RX ORDER — SODIUM CHLORIDE 9 MG/ML
INJECTION, SOLUTION INTRAVENOUS CONTINUOUS PRN
Status: DISCONTINUED | OUTPATIENT
Start: 2022-05-09 | End: 2022-05-09 | Stop reason: SDUPTHER

## 2022-05-09 RX ORDER — SODIUM CHLORIDE 0.9 % (FLUSH) 0.9 %
5-40 SYRINGE (ML) INJECTION EVERY 12 HOURS SCHEDULED
Status: DISCONTINUED | OUTPATIENT
Start: 2022-05-09 | End: 2022-05-10 | Stop reason: HOSPADM

## 2022-05-09 RX ORDER — PROPOFOL 10 MG/ML
INJECTION, EMULSION INTRAVENOUS PRN
Status: DISCONTINUED | OUTPATIENT
Start: 2022-05-09 | End: 2022-05-09 | Stop reason: SDUPTHER

## 2022-05-09 RX ORDER — ONDANSETRON 2 MG/ML
4 INJECTION INTRAMUSCULAR; INTRAVENOUS
Status: ACTIVE | OUTPATIENT
Start: 2022-05-09 | End: 2022-05-09

## 2022-05-09 RX ORDER — DROPERIDOL 2.5 MG/ML
0.62 INJECTION, SOLUTION INTRAMUSCULAR; INTRAVENOUS
Status: ACTIVE | OUTPATIENT
Start: 2022-05-09 | End: 2022-05-09

## 2022-05-09 RX ORDER — ROCURONIUM BROMIDE 10 MG/ML
INJECTION, SOLUTION INTRAVENOUS PRN
Status: DISCONTINUED | OUTPATIENT
Start: 2022-05-09 | End: 2022-05-09 | Stop reason: SDUPTHER

## 2022-05-09 RX ORDER — GLYCOPYRROLATE 1 MG/5 ML
SYRINGE (ML) INTRAVENOUS PRN
Status: DISCONTINUED | OUTPATIENT
Start: 2022-05-09 | End: 2022-05-09 | Stop reason: SDUPTHER

## 2022-05-09 RX ORDER — DIPHENHYDRAMINE HYDROCHLORIDE 50 MG/ML
12.5 INJECTION INTRAMUSCULAR; INTRAVENOUS
Status: ACTIVE | OUTPATIENT
Start: 2022-05-09 | End: 2022-05-09

## 2022-05-09 RX ORDER — OXYCODONE HYDROCHLORIDE 5 MG/1
5 TABLET ORAL
Status: ACTIVE | OUTPATIENT
Start: 2022-05-09 | End: 2022-05-09

## 2022-05-09 RX ORDER — SODIUM CHLORIDE 0.9 % (FLUSH) 0.9 %
5-40 SYRINGE (ML) INJECTION PRN
Status: DISCONTINUED | OUTPATIENT
Start: 2022-05-09 | End: 2022-05-10 | Stop reason: HOSPADM

## 2022-05-09 RX ORDER — SODIUM CHLORIDE 9 MG/ML
25 INJECTION, SOLUTION INTRAVENOUS PRN
Status: DISCONTINUED | OUTPATIENT
Start: 2022-05-09 | End: 2022-05-10 | Stop reason: HOSPADM

## 2022-05-09 RX ORDER — NEOSTIGMINE METHYLSULFATE 1 MG/ML
INJECTION, SOLUTION INTRAVENOUS PRN
Status: DISCONTINUED | OUTPATIENT
Start: 2022-05-09 | End: 2022-05-09 | Stop reason: SDUPTHER

## 2022-05-09 RX ORDER — LIDOCAINE HYDROCHLORIDE 20 MG/ML
INJECTION, SOLUTION INTRAVENOUS PRN
Status: DISCONTINUED | OUTPATIENT
Start: 2022-05-09 | End: 2022-05-09 | Stop reason: SDUPTHER

## 2022-05-09 RX ADMIN — HYDROMORPHONE HYDROCHLORIDE 0.5 MG: 1 INJECTION, SOLUTION INTRAMUSCULAR; INTRAVENOUS; SUBCUTANEOUS at 23:26

## 2022-05-09 RX ADMIN — METRONIDAZOLE 500 MG: 500 INJECTION, SOLUTION INTRAVENOUS at 22:07

## 2022-05-09 RX ADMIN — Medication 1 TABLET: at 09:05

## 2022-05-09 RX ADMIN — SODIUM CHLORIDE: 9 INJECTION, SOLUTION INTRAVENOUS at 21:07

## 2022-05-09 RX ADMIN — SUGAMMADEX 250 MG: 100 INJECTION, SOLUTION INTRAVENOUS at 22:56

## 2022-05-09 RX ADMIN — LIDOCAINE HYDROCHLORIDE 60 MG: 20 INJECTION, SOLUTION INTRAVENOUS at 21:24

## 2022-05-09 RX ADMIN — PROPOFOL 150 MG: 10 INJECTION, EMULSION INTRAVENOUS at 21:24

## 2022-05-09 RX ADMIN — Medication 3 MG: at 22:36

## 2022-05-09 RX ADMIN — MIDAZOLAM 2 MG: 1 INJECTION INTRAMUSCULAR; INTRAVENOUS at 21:10

## 2022-05-09 RX ADMIN — ROCURONIUM BROMIDE 40 MG: 10 SOLUTION INTRAVENOUS at 21:24

## 2022-05-09 RX ADMIN — Medication 0.4 MG: at 22:36

## 2022-05-09 RX ADMIN — ISOSORBIDE MONONITRATE 30 MG: 30 TABLET, EXTENDED RELEASE ORAL at 09:05

## 2022-05-09 RX ADMIN — IPRATROPIUM BROMIDE AND ALBUTEROL SULFATE 1 AMPULE: .5; 2.5 SOLUTION RESPIRATORY (INHALATION) at 23:41

## 2022-05-09 RX ADMIN — FAMOTIDINE 20 MG: 10 INJECTION INTRAVENOUS at 09:05

## 2022-05-09 RX ADMIN — ONDANSETRON HYDROCHLORIDE 4 MG: 2 SOLUTION INTRAMUSCULAR; INTRAVENOUS at 22:20

## 2022-05-09 RX ADMIN — FENTANYL CITRATE 50 MCG: 50 INJECTION, SOLUTION INTRAMUSCULAR; INTRAVENOUS at 21:41

## 2022-05-09 RX ADMIN — FENTANYL CITRATE 100 MCG: 50 INJECTION, SOLUTION INTRAMUSCULAR; INTRAVENOUS at 21:24

## 2022-05-09 RX ADMIN — CEFTRIAXONE 2000 MG: 2 INJECTION, POWDER, FOR SOLUTION INTRAMUSCULAR; INTRAVENOUS at 21:20

## 2022-05-09 RX ADMIN — HYDROMORPHONE HYDROCHLORIDE 0.5 MG: 1 INJECTION, SOLUTION INTRAMUSCULAR; INTRAVENOUS; SUBCUTANEOUS at 23:16

## 2022-05-09 RX ADMIN — TORSEMIDE 20 MG: 20 TABLET ORAL at 09:05

## 2022-05-09 RX ADMIN — METRONIDAZOLE 500 MG: 500 INJECTION, SOLUTION INTRAVENOUS at 16:31

## 2022-05-09 RX ADMIN — METRONIDAZOLE 500 MG: 500 INJECTION, SOLUTION INTRAVENOUS at 06:51

## 2022-05-09 RX ADMIN — AMLODIPINE BESYLATE 10 MG: 10 TABLET ORAL at 09:04

## 2022-05-09 RX ADMIN — SODIUM CHLORIDE: 9 INJECTION, SOLUTION INTRAVENOUS at 22:20

## 2022-05-09 RX ADMIN — ROCURONIUM BROMIDE 20 MG: 10 SOLUTION INTRAVENOUS at 21:56

## 2022-05-09 RX ADMIN — METOPROLOL TARTRATE 50 MG: 50 TABLET, FILM COATED ORAL at 09:04

## 2022-05-09 RX ADMIN — LISINOPRIL 20 MG: 20 TABLET ORAL at 09:05

## 2022-05-09 ASSESSMENT — PULMONARY FUNCTION TESTS
PIF_VALUE: 18
PIF_VALUE: 33
PIF_VALUE: 32
PIF_VALUE: 0
PIF_VALUE: 18
PIF_VALUE: 34
PIF_VALUE: 32
PIF_VALUE: 33
PIF_VALUE: 35
PIF_VALUE: 17
PIF_VALUE: 33
PIF_VALUE: 36
PIF_VALUE: 18
PIF_VALUE: 0
PIF_VALUE: 33
PIF_VALUE: 0
PIF_VALUE: 0
PIF_VALUE: 33
PIF_VALUE: 17
PIF_VALUE: 33
PIF_VALUE: 22
PIF_VALUE: 17
PIF_VALUE: 0
PIF_VALUE: 0
PIF_VALUE: 34
PIF_VALUE: 0
PIF_VALUE: 0
PIF_VALUE: 17
PIF_VALUE: 34
PIF_VALUE: 0
PIF_VALUE: 18
PIF_VALUE: 33
PIF_VALUE: 33
PIF_VALUE: 35
PIF_VALUE: 0
PIF_VALUE: 28
PIF_VALUE: 30
PIF_VALUE: 38
PIF_VALUE: 17
PIF_VALUE: 0
PIF_VALUE: 34
PIF_VALUE: 33
PIF_VALUE: 33
PIF_VALUE: 0
PIF_VALUE: 0
PIF_VALUE: 33
PIF_VALUE: 17
PIF_VALUE: 0
PIF_VALUE: 33
PIF_VALUE: 1
PIF_VALUE: 17
PIF_VALUE: 0
PIF_VALUE: 19
PIF_VALUE: 32
PIF_VALUE: 33
PIF_VALUE: 17
PIF_VALUE: 0
PIF_VALUE: 32
PIF_VALUE: 1
PIF_VALUE: 32
PIF_VALUE: 1
PIF_VALUE: 32
PIF_VALUE: 0
PIF_VALUE: 0
PIF_VALUE: 30
PIF_VALUE: 0
PIF_VALUE: 32
PIF_VALUE: 17
PIF_VALUE: 0
PIF_VALUE: 17
PIF_VALUE: 34
PIF_VALUE: 33
PIF_VALUE: 17
PIF_VALUE: 33
PIF_VALUE: 11
PIF_VALUE: 35
PIF_VALUE: 32
PIF_VALUE: 4
PIF_VALUE: 34
PIF_VALUE: 33
PIF_VALUE: 17
PIF_VALUE: 0
PIF_VALUE: 2
PIF_VALUE: 34
PIF_VALUE: 0
PIF_VALUE: 0
PIF_VALUE: 33
PIF_VALUE: 17
PIF_VALUE: 33
PIF_VALUE: 2
PIF_VALUE: 0
PIF_VALUE: 18
PIF_VALUE: 33
PIF_VALUE: 0
PIF_VALUE: 17
PIF_VALUE: 33
PIF_VALUE: 30
PIF_VALUE: 18
PIF_VALUE: 17
PIF_VALUE: 33
PIF_VALUE: 36
PIF_VALUE: 33
PIF_VALUE: 34
PIF_VALUE: 33
PIF_VALUE: 33
PIF_VALUE: 29

## 2022-05-09 ASSESSMENT — PAIN SCALES - GENERAL
PAINLEVEL_OUTOF10: 8
PAINLEVEL_OUTOF10: 8

## 2022-05-09 ASSESSMENT — PAIN DESCRIPTION - ORIENTATION
ORIENTATION: MID;RIGHT
ORIENTATION: MID;RIGHT

## 2022-05-09 ASSESSMENT — PAIN DESCRIPTION - LOCATION
LOCATION: ABDOMEN
LOCATION: ABDOMEN

## 2022-05-09 ASSESSMENT — PAIN DESCRIPTION - DESCRIPTORS
DESCRIPTORS: ACHING;SORE
DESCRIPTORS: ACHING;SORE

## 2022-05-09 NOTE — PROGRESS NOTES
Occupational Therapy  Date:2022  Patient Name: Jimena Fuller  MRN: [id number]  : 1955  Room: [address] [place] [address]     OT order received and appreciated. Chart reviewed. OT evaluation held, plan for laparoscopic robot-assisted cholecystectomy 5/10/22 . Will follow.     Rohini Potter OTR/L #[id number]

## 2022-05-09 NOTE — PROGRESS NOTES
[facility]   Progress note      Subjective: The patient is awake and alert. Resting comfortably no apparent acute distress  Indicates she still having intermittent abdominal pain  Remains n.p.o. Objective:    BP (!) 120/53   Pulse 71   Temp 98.2 °F (36.8 °C) (Oral)   Resp 18   Ht 5' 1\" (1.549 m)   Wt 234 lb 6.4 oz (106.3 kg)   SpO2 98%   BMI 44.29 kg/m²     No intake/output data recorded. No intake/output data recorded. General appearance: NAD, conversant  HEENT: AT/NC, MMM  Neck: FROM, supple  Lungs: Clear to auscultation  CV: RRR, no MRGs  Vasc: Radial pulses 2+  Abdomen: Soft, non-tender; no masses or HSM  Extremities: No peripheral edema or digital cyanosis  Skin: no rash, lesions or ulcers  Psych: Alert and oriented to person, place and time  Neuro: Alert and interactive     Recent Labs     05/07/22  0550 05/08/22  0557 05/09/22  1028   WBC 21.1* 14.0* 9.1   HGB 13.0 11.3* 12.3   HCT 40.8 36.6 39.7    328 404       Recent Labs     05/07/22  0550 05/09/22  1028    140   K 4.1 4.0    102   CO2 22 19*   BUN 8 14   CREATININE 0.8 0.8   CALCIUM 8.8 8.8       Assessment:    Principal Problem:    Acute cholecystitis  Active Problems:    TIM (obstructive sleep apnea)    Type 2 diabetes mellitus, without long-term current use of insulin (HCC)    Primary hypertension    Lactic acid acidosis    Renal mass    Hepatomegaly    Pleural effusion on right    Leukocytosis  Resolved Problems:    * No resolved hospital problems.  *      Plan:    Patient is a 22-year-old female admitted to [facility] for  Acute cholecystitis  -Monitor labs, CBC  -WBC 16.5 >15.1- 21k,  down to 14,000 today  -General Surgery  following plan for cholecystectomy  Tuesday  -US RUQ: Hydropic gallbladder with wall thickening  -NM HIDA scan: Pending, if positive tentative cholecystectomy 5/9  -IV Rocephin and Flagyl-continue for now-  -Cardiology consulted for preoperative cardiovascular assessment- cleared to proceed with core intervention as needed  -A. m. labs  DM type II  -Monitor labs  -ISS glucose control  -Hypoglycemia protocol initiated     New right kidney mass-concerning for renal cell carcinoma  -MRI abdomen completed 5/6/2022 with soft tissue intensity mass lesion concerning for malignancy  -Monitor kidney function-within normal limits   urology consult appreciated-recommendation noted for follow-up in [place] for retroperitoneoscopic partial nephrectomy given her overall poor health and multiple comorbidities  This is to be done after patient undergoes surgery gallbladder surgery     Hypertension  -continue home medications  -Adequately controlled     Right Pleural effusion   -monitor respiratory status   -obtai CXR if respiratory status changes     DVT Prophylaxis   PT/OT  Discharge planning           Lisa Russo MD  4:25 PM  5/9/2022

## 2022-05-09 NOTE — PROGRESS NOTES
Physical Therapy  Physical Therapy Initial Assessment     Name: Torsten Figueroa  : 1955  MRN: [id number]      Date of Service: 2022    Evaluating PT:  Inessa Martinez, PT, DPT [id number]     Room #:  1494/6825-Q  Diagnosis:  Acute cholecystitis [K81.0]  Ventral hernia without obstruction or gangrene [K43.9]  Renal mass [N28.89]  Reason for admission: emesis   Precautions:  None   Procedure/Surgery:  None   Equipment Recommendations:  None     SUBJECTIVE:  Pt lives with family in a 3 story home with 5-6 stair(s) to enter and 2 rail(s). Bed is on 2nd floor and bath is on 2nd floor. Pt ambulated with no AD PTA - has quad cane if needed. OBJECTIVE:   Initial Evaluation  Date:    AM-PAC 6 Clicks 32/38   Was pt agreeable to Eval/treatment? yes   Does pt have pain? denies   Bed Mobility  Rolling: NT  Supine to sit: Independent  Sit to supine: independent  Scooting: independent    Transfers Sit to stand: independent  Stand to sit: independent  Stand pivot: NT   Ambulation    150 feet with no AD independent     Stair negotiation: ascended and descended  NT   ROM BUE:  See OT eval   BLE:  WFL   Strength BUE:  See OT eval   BLE:  knee ext 5/5  Ankle DF 5/5   Balance Sitting EOB:  independent  Dynamic Standing:  independent     -Pt is A & O x 3  -Sensation:  Pt denies numbness and tingling to extremities  -Edema:  unremarkable     Therapeutic Exercises:  Functional activity     Patient education  Pt educated on safety, sequencing of transfers, and role of PT    Patient response to education:   Pt verbalized understanding Pt demonstrated skill Pt requires further education in this area   yes yes no     ASSESSMENT:  Conditions Requiring Skilled Therapeutic Intervention:  NA    Comments:  Pt received sitting EOB and agreeable to PT session   Pt able to get out of bed, stand, and ambulate without difficulty or assist. Pt is at independent baseline with no acute care PT goals/needs identified. DC from PT service. Pt with all needs met and call light in reach. Treatment:  Patient practiced and was instructed in the following treatment:     Patient education provided continuously throughout session for sequencing, safety maintenance, and improving any deficits found during the evaluation. PHYSICAL THERAPY PLAN OF CARE:    PT POC is established based on physician order and patient diagnosis     Referring provider/PT Order: 05/06/22 0515   PT eval and treat Start: 05/06/22 0515, End: 05/06/22 0515, ONE TIME, Standing Count: 1 Occurrences, R    Izzy Green, APRN - CNP     Diagnosis:  Acute cholecystitis [K81.0]  Ventral hernia without obstruction or gangrene [K43.9]  Renal mass [N28.89]    Pt is at independent baseline with no acute care PT goals/needs identified. DC from PT service. Time in  0925  Time out  0935    Total Treatment Time  - minutes     Evaluation Time includes thorough review of current medical information, gathering information on past medical history/social history and prior level of function, completion of standardized testing/informal observation of tasks, assessment of data and education on plan of care and goals.     CPT codes:  [x] Low Complexity PT evaluation 11471  [] Moderate Complexity PT evaluation 56384  [] High Complexity PT evaluation 25004  [] PT Re-evaluation 32748  [] Gait training 62706 - minutes  [] Manual therapy 68092 - minutes  [] Therapeutic activities 90835 - minutes  [] Therapeutic exercises 35421 - minutes  [] Neuromuscular reeducation 51182 - minutes     Crystal Kyle PT, DPT  [id number]

## 2022-05-09 NOTE — ANESTHESIA PRE PROCEDURE
Department of Anesthesiology  Preprocedure Note       Name:  Becky Arrington   Age:  61 y.o.  :  1955                                          MRN:  [id number]         Date:  2022      Surgeon: Karen Leonard):  Igor Dumont MD    Procedure: Procedure(s):  LAPAROSCOPIC CHOLECYSTECTOMY ROBOTIC  POSS; CHOLANGIOGRAM (LONG LAP INSTRUMENTS AVAILABLE)    Medications prior to admission:   Prior to Admission medications    Medication Sig Start Date End Date Taking?  Authorizing Provider   Fish Oil-Krill Oil [facility] ADVANCED 4 IN 1 PO) Take by mouth   Yes Historical Provider, MD   isosorbide mononitrate (IMDUR) 30 MG extended release tablet Take 30 mg by mouth daily   Yes Historical Provider, MD   NONFORMULARY    Yes Historical Provider, MD   Semaglutide (OZEMPIC, 2 MG/DOSE, SC) Inject into the skin   Yes Historical Provider, MD   Ertugliflozin L-PyroglutamicAc (STEGLATRO PO) Take by mouth   Yes Historical Provider, MD   cephALEXin (KEFLEX) 500 MG capsule Take 500 mg by mouth 2 times daily 16   Historical Provider, MD   lidocaine (XYLOCAINE) 5 % ointment  16   Historical Provider, MD   metFORMIN (GLUCOPHAGE) 500 MG tablet Take 500 mg by mouth 3 times daily (with meals)    Historical Provider, MD   metoprolol (LOPRESSOR) 50 MG tablet Take 50 mg by mouth 2 times daily    Historical Provider, MD   amLODIPine (NORVASC) 10 MG tablet Take 10 mg by mouth daily    Historical Provider, MD   torsemide (DEMADEX) 20 MG tablet Take 20 mg by mouth daily    Historical Provider, MD   atorvastatin (LIPITOR) 20 MG tablet Take 20 mg by mouth daily    Historical Provider, MD   lisinopril (PRINIVIL;ZESTRIL) 20 MG tablet Take 20 mg by mouth daily    Historical Provider, MD   Multiple Vitamins-Minerals (THERAPEUTIC MULTIVITAMIN-MINERALS) tablet Take 1 tablet by mouth daily    Historical Provider, MD   Calcium-Vitamin D-Vitamin K (VIACTIV PO) Take 1 tablet by mouth daily    Historical Provider, MD   Biotin w/ Vitamins C & E (HAIR SKIN & NAILS GUMMIES PO) Take 2 tablets by mouth daily    Historical Provider, MD   aspirin 325 MG EC tablet Take 325 mg by mouth daily    Historical Provider, MD   Omega-3 Fatty Acids (FISH OIL CONCENTRATE PO) Take 1 capsule by mouth daily    Historical Provider, MD   aspirin-acetaminophen-caffeine (Alexander Cordia) 066-658-08 MG per tablet Take 1 tablet by mouth every 6 hours as needed for Headaches    Historical Provider, MD       Current medications:    Current Facility-Administered Medications   Medication Dose Route Frequency Provider Last Rate Last Admin    indocyanine green (IC-GREEN) syringe 5 mg  5 mg IntraVENous Once Chuyita Hartmann,         famotidine (PEPCID) tablet 20 mg  20 mg Oral BID WILL Ruiz - CNP        sodium chloride flush 0.9 % injection 5-40 mL  5-40 mL IntraVENous 2 times per day April Peter APRN - CNP   10 mL at 05/08/22 7895    sodium chloride flush 0.9 % injection 5-40 mL  5-40 mL IntraVENous PRN April Peter APRN - CNP        0.9 % sodium chloride infusion   IntraVENous PRN April Peter, APRN - CNP        enoxaparin Sodium (LOVENOX) injection 30 mg  30 mg SubCUTAneous BID April Peter APRN - CNP   30 mg at 05/08/22 8368    ondansetron (ZOFRAN-ODT) disintegrating tablet 4 mg  4 mg Oral Q8H PRN April Peter, APRN - CNP        Or    ondansetron [facility]) injection 4 mg  4 mg IntraVENous Q6H PRN April Peter, APRN - CNP   4 mg at 05/06/22 0530    acetaminophen (TYLENOL) tablet 650 mg  650 mg Oral Q6H PRN April Peter, APRN - CNP        Or    acetaminophen (TYLENOL) suppository 650 mg  650 mg Rectal Q6H PRN April Peter, APRN - CNP        bisacodyl (DULCOLAX) suppository 10 mg  10 mg Rectal Daily PRN April Darnellus, APRN - CNP        glucose (GLUTOSE) 40 % oral gel 15 g  15 g Oral PRN April Peter, APRN - CNP        dextrose 50 % IV solution  12.5 g IntraVENous PRN April WILL Green CNP        glucagon (rDNA) injection 1 mg  1 mg IntraMUSCular PRN April WILL Green CNP        dextrose 5 % solution  100 mL/hr IntraVENous PRN April WILL Green CNP        0.9 % sodium chloride infusion   IntraVENous Continuous April WILL Green  mL/hr at 05/08/22 2127 New Bag at 05/08/22 2127    metronidazole (FLAGYL) 500 mg in 0.9% NaCl 100 mL IVPB premix  500 mg IntraVENous Q8H April WILL Green CNP   Stopped at 05/09/22 1800    amLODIPine (NORVASC) tablet 10 mg  10 mg Oral Daily April WILL Green CNP   10 mg at 05/09/22 5944    [Held by provider] aspirin EC tablet 325 mg  325 mg Oral Daily April WILL Green CNP        atorvastatin (LIPITOR) tablet 20 mg  20 mg Oral Nightly April WILL Green CNP   20 mg at 05/08/22 2131    isosorbide mononitrate (IMDUR) extended release tablet 30 mg  30 mg Oral Daily April WILL Green CNP   30 mg at 05/09/22 0905    lisinopril (PRINIVIL;ZESTRIL) tablet 20 mg  20 mg Oral Daily April WILL Green CNP   20 mg at 05/09/22 0905    metoprolol tartrate (LOPRESSOR) tablet 50 mg  50 mg Oral BID April WILL Green CNP   50 mg at 05/09/22 9510    therapeutic multivitamin-minerals 1 tablet  1 tablet Oral Daily April WILL Green CNP   1 tablet at 05/09/22 0905    torsemide (DEMADEX) tablet 20 mg  20 mg Oral Daily April WILL Green CNP   20 mg at 05/09/22 0905    insulin lispro (HUMALOG) injection vial 0-12 Units  0-12 Units SubCUTAneous TID [facility] April WILL Green CNP        insulin lispro (HUMALOG) injection vial 0-6 Units  0-6 Units SubCUTAneous Nightly April Anna-Maico, APRN - CNP        cefTRIAXone (ROCEPHIN) 2,000 mg in sterile water 20 mL IV syringe  2,000 mg IntraVENous Q24H Chuyita Hartmann DO   2,000 mg at 05/08/22 2129       Allergies: Allergies   Allergen Reactions    Latex Swelling and Rash    Doxycycline Itching and Nausea Only    Seasonal Other (See Comments)     Watery eyes, sneezing and runny nose    Adhesive Tape Dermatitis and Rash       Problem List:    Patient Active Problem List   Diagnosis Code    Leukocytosis D72.829    Acute cholecystitis K81.0    HLD (hyperlipidemia) E78.5    TIM (obstructive sleep apnea) G47.33    Type 2 diabetes mellitus, without long-term current use of insulin (HCC) E11.9    Morbid obesity with BMI of 40.0-44.9, adult (HCC) E66.01, Z68.41    Primary hypertension I10    Hyperthyroidism E05.90    Lactic acid acidosis E87.2    Renal mass N28.89    Hepatomegaly R16.0    Pleural effusion on right J90       Past Medical History:        Diagnosis Date    Allergic rhinitis     Cerebrovascular disease 2012    Cerebrovascular disease 2014    Diabetes ([facility] [address]     type 2    Hyperlipidemia     Hypertension     Irregular heart beat 2001    Myocardial infarct ([facility] [address] 2014    Neuropathy     right foot    Obesity     Sleep apnea     declines to to wear a CPAP    Thyroid disease     hyperthyroid    Weakness due to cerebrovascular accident (CVA) 2014    on right side       Past Surgical History:        Procedure Laterality Date    COLONOSCOPY  2000    COLONOSCOPY  2016    HYSTERECTOMY  2001    complete       Social History:    Social History     Tobacco Use    Smoking status: Never Smoker    Smokeless tobacco: Never Used   Substance Use Topics    Alcohol use:  No                                Counseling given: Not Answered      Vital Signs (Current):   Vitals:    05/08/22 2137 05/09/22 0322 05/09/22 0645 05/09/22 0800   BP: 133/61 (!) 106/48  (!) 120/53   Pulse: 78 69  71   Resp:  16  18   Temp:  36.7 °C (98.1 °F)  36.8 °C (98.2 °F)   TempSrc:    Oral   SpO2:  95%  98%   Weight:   234 lb 6.4 oz (106.3 kg)    Height:                                                  BP Readings from Last 3 Encounters:   05/09/22 (!) 120/53   04/29/16 161/86   04/01/16 160/87       NPO Status:greater than 8 hours                                                                                 BMI:   Wt Readings from Last 3 Encounters:   05/09/22 234 lb 6.4 oz (106.3 kg)   05/06/22 243 lb (110.2 kg)   04/29/16 278 lb 14.4 oz (126.5 kg)     Body mass index is 44.29 kg/m². CBC:   Lab Results   Component Value Date    WBC 9.1 05/09/2022    RBC 4.53 05/09/2022    HGB 12.3 05/09/2022    HCT 39.7 05/09/2022    MCV 87.6 05/09/2022    RDW 15.3 05/09/2022     05/09/2022       CMP:   Lab Results   Component Value Date     05/09/2022    K 4.0 05/09/2022    K 4.4 05/06/2022     05/09/2022    CO2 19 05/09/2022    BUN 14 05/09/2022    CREATININE 0.8 05/09/2022    GFRAA >60 05/09/2022    LABGLOM >60 05/09/2022    GLUCOSE 104 05/09/2022    PROT 7.1 05/09/2022    CALCIUM 8.8 05/09/2022    BILITOT 0.6 05/09/2022    ALKPHOS 230 05/09/2022     05/09/2022     05/09/2022       POC Tests: No results for input(s): POCGLU, POCNA, POCK, POCCL, POCBUN, POCHEMO, POCHCT in the last 72 hours.     Coags: No results found for: PROTIME, INR, APTT    HCG (If Applicable): No results found for: PREGTESTUR, PREGSERUM, HCG, HCGQUANT     ABGs: No results found for: PHART, PO2ART, IPM2GVJ, KJN7VWH, BEART, X2FSWHAN     Type & Screen (If Applicable):  No results found for: LABABO, LABRH    Drug/Infectious Status (If Applicable):  No results found for: HIV, HEPCAB    COVID-19 Screening (If Applicable): No results found for: COVID19        Anesthesia Evaluation  Patient summary reviewed and Nursing notes reviewed no history of anesthetic complications:   Airway: Mallampati: III  TM distance: <3 FB   Neck ROM: full  Mouth opening: > = 3 FB Dental:          Pulmonary:   (+) sleep apnea: on noncompliant,  decreased breath sounds,                             Cardiovascular:    (+) hypertension:, past MI:, dysrhythmias:, Rhythm: irregular  Rate: normal                    Neuro/Psych:   (+) CVA: residual symptoms,             GI/Hepatic/Renal:   (+) liver disease:,           Endo/Other:    (+) DiabetesType II DM, , hyperthyroidism::., .                 Abdominal:   (+) obese,     Abdomen: soft. Bowel sounds: decreased. Vascular: Other Findings:             Anesthesia Plan      general     ASA 3       Induction: intravenous. MIPS: Postoperative opioids intended and Prophylactic antiemetics administered. Anesthetic plan and risks discussed with patient. Plan discussed with attending. WILL Floyd CRNA   5/9/2022    DOS STAFF ADDENDUM:    Patient seen and chart reviewed. Physical exam and history updated as indicated. NPO status confirmed. Anesthesia options and plan discussed including risks benefits with patient/legal guardian and family as available. Concerns and questions addressed. Consent verbalized to proceed.   Anesthesia plan, options and intraoperative/postoperative concerns discussed with care team.    Lanny Wright MD, MD  5/9/2022  9:13 PM

## 2022-05-10 ENCOUNTER — APPOINTMENT (OUTPATIENT)
Dept: GENERAL RADIOLOGY | Age: 67
DRG: 418 | End: 2022-05-10
Payer: MEDICARE

## 2022-05-10 LAB
ALBUMIN SERPL-MCNC: 2.9 G/DL (ref 3.5–5.2)
ALP BLD-CCNC: 191 U/L (ref 35–104)
ALT SERPL-CCNC: 136 U/L (ref 0–32)
ANION GAP SERPL CALCULATED.3IONS-SCNC: 21 MMOL/L (ref 7–16)
AST SERPL-CCNC: 78 U/L (ref 0–31)
BILIRUB SERPL-MCNC: 0.3 MG/DL (ref 0–1.2)
BILIRUBIN DIRECT: <0.2 MG/DL (ref 0–0.3)
BILIRUBIN, INDIRECT: ABNORMAL MG/DL (ref 0–1)
BUN BLDV-MCNC: 18 MG/DL (ref 6–23)
CALCIUM SERPL-MCNC: 8.7 MG/DL (ref 8.6–10.2)
CHLORIDE BLD-SCNC: 105 MMOL/L (ref 98–107)
CO2: 13 MMOL/L (ref 22–29)
CREAT SERPL-MCNC: 1 MG/DL (ref 0.5–1)
GFR AFRICAN AMERICAN: >60
GFR NON-AFRICAN AMERICAN: 56 ML/MIN/1.73
GLUCOSE BLD-MCNC: 184 MG/DL (ref 74–99)
HCT VFR BLD CALC: 42.7 % (ref 34–48)
HEMOGLOBIN: 12.7 G/DL (ref 11.5–15.5)
MCH RBC QN AUTO: 27.7 PG (ref 26–35)
MCHC RBC AUTO-ENTMCNC: 29.7 % (ref 32–34.5)
MCV RBC AUTO: 93 FL (ref 80–99.9)
METER GLUCOSE: 156 MG/DL (ref 74–99)
METER GLUCOSE: 177 MG/DL (ref 74–99)
METER GLUCOSE: 186 MG/DL (ref 74–99)
METER GLUCOSE: 201 MG/DL (ref 74–99)
METER GLUCOSE: 238 MG/DL (ref 74–99)
PDW BLD-RTO: 15.7 FL (ref 11.5–15)
PLATELET # BLD: 359 E9/L (ref 130–450)
PMV BLD AUTO: 10.2 FL (ref 7–12)
POTASSIUM SERPL-SCNC: 4.8 MMOL/L (ref 3.5–5)
RBC # BLD: 4.59 E12/L (ref 3.5–5.5)
SODIUM BLD-SCNC: 139 MMOL/L (ref 132–146)
TOTAL PROTEIN: 6.9 G/DL (ref 6.4–8.3)
WBC # BLD: 16.4 E9/L (ref 4.5–11.5)

## 2022-05-10 PROCEDURE — 6360000002 HC RX W HCPCS: Performed by: STUDENT IN AN ORGANIZED HEALTH CARE EDUCATION/TRAINING PROGRAM

## 2022-05-10 PROCEDURE — 36415 COLL VENOUS BLD VENIPUNCTURE: CPT

## 2022-05-10 PROCEDURE — 80048 BASIC METABOLIC PNL TOTAL CA: CPT

## 2022-05-10 PROCEDURE — 51798 US URINE CAPACITY MEASURE: CPT

## 2022-05-10 PROCEDURE — 71045 X-RAY EXAM CHEST 1 VIEW: CPT

## 2022-05-10 PROCEDURE — 2580000003 HC RX 258: Performed by: STUDENT IN AN ORGANIZED HEALTH CARE EDUCATION/TRAINING PROGRAM

## 2022-05-10 PROCEDURE — 51701 INSERT BLADDER CATHETER: CPT

## 2022-05-10 PROCEDURE — 85027 COMPLETE CBC AUTOMATED: CPT

## 2022-05-10 PROCEDURE — 6370000000 HC RX 637 (ALT 250 FOR IP): Performed by: STUDENT IN AN ORGANIZED HEALTH CARE EDUCATION/TRAINING PROGRAM

## 2022-05-10 PROCEDURE — 2140000000 HC CCU INTERMEDIATE R&B

## 2022-05-10 PROCEDURE — 2500000003 HC RX 250 WO HCPCS: Performed by: STUDENT IN AN ORGANIZED HEALTH CARE EDUCATION/TRAINING PROGRAM

## 2022-05-10 PROCEDURE — 80076 HEPATIC FUNCTION PANEL: CPT

## 2022-05-10 PROCEDURE — 82962 GLUCOSE BLOOD TEST: CPT

## 2022-05-10 PROCEDURE — 51702 INSERT TEMP BLADDER CATH: CPT

## 2022-05-10 PROCEDURE — 2700000000 HC OXYGEN THERAPY PER DAY

## 2022-05-10 RX ORDER — OXYCODONE HYDROCHLORIDE 5 MG/1
5 TABLET ORAL EVERY 4 HOURS PRN
Status: DISCONTINUED | OUTPATIENT
Start: 2022-05-10 | End: 2022-05-13 | Stop reason: HOSPADM

## 2022-05-10 RX ORDER — OXYCODONE HYDROCHLORIDE 10 MG/1
10 TABLET ORAL EVERY 4 HOURS PRN
Status: DISCONTINUED | OUTPATIENT
Start: 2022-05-10 | End: 2022-05-13 | Stop reason: HOSPADM

## 2022-05-10 RX ADMIN — INSULIN LISPRO 2 UNITS: 100 INJECTION, SOLUTION INTRAVENOUS; SUBCUTANEOUS at 18:30

## 2022-05-10 RX ADMIN — METRONIDAZOLE 500 MG: 500 INJECTION, SOLUTION INTRAVENOUS at 06:19

## 2022-05-10 RX ADMIN — Medication 1 TABLET: at 09:25

## 2022-05-10 RX ADMIN — ATORVASTATIN CALCIUM 20 MG: 20 TABLET, FILM COATED ORAL at 01:14

## 2022-05-10 RX ADMIN — OXYCODONE HYDROCHLORIDE 10 MG: 10 TABLET ORAL at 01:15

## 2022-05-10 RX ADMIN — ISOSORBIDE MONONITRATE 30 MG: 30 TABLET, EXTENDED RELEASE ORAL at 09:25

## 2022-05-10 RX ADMIN — METOPROLOL TARTRATE 50 MG: 50 TABLET, FILM COATED ORAL at 20:12

## 2022-05-10 RX ADMIN — OXYCODONE 5 MG: 5 TABLET ORAL at 20:12

## 2022-05-10 RX ADMIN — FAMOTIDINE 20 MG: 20 TABLET ORAL at 20:12

## 2022-05-10 RX ADMIN — ENOXAPARIN SODIUM 30 MG: 100 INJECTION SUBCUTANEOUS at 09:25

## 2022-05-10 RX ADMIN — METRONIDAZOLE 500 MG: 500 INJECTION, SOLUTION INTRAVENOUS at 20:18

## 2022-05-10 RX ADMIN — INSULIN LISPRO 2 UNITS: 100 INJECTION, SOLUTION INTRAVENOUS; SUBCUTANEOUS at 09:25

## 2022-05-10 RX ADMIN — TORSEMIDE 20 MG: 20 TABLET ORAL at 09:25

## 2022-05-10 RX ADMIN — HYDROMORPHONE HYDROCHLORIDE 1 MG: 1 INJECTION, SOLUTION INTRAMUSCULAR; INTRAVENOUS; SUBCUTANEOUS at 09:26

## 2022-05-10 RX ADMIN — FAMOTIDINE 20 MG: 20 TABLET ORAL at 01:14

## 2022-05-10 RX ADMIN — METOPROLOL TARTRATE 50 MG: 50 TABLET, FILM COATED ORAL at 09:25

## 2022-05-10 RX ADMIN — FAMOTIDINE 20 MG: 20 TABLET ORAL at 09:25

## 2022-05-10 RX ADMIN — INSULIN LISPRO 2 UNITS: 100 INJECTION, SOLUTION INTRAVENOUS; SUBCUTANEOUS at 20:20

## 2022-05-10 RX ADMIN — INSULIN LISPRO 2 UNITS: 100 INJECTION, SOLUTION INTRAVENOUS; SUBCUTANEOUS at 12:30

## 2022-05-10 RX ADMIN — ATORVASTATIN CALCIUM 20 MG: 20 TABLET, FILM COATED ORAL at 20:12

## 2022-05-10 RX ADMIN — Medication 10 ML: at 20:13

## 2022-05-10 RX ADMIN — ENOXAPARIN SODIUM 30 MG: 100 INJECTION SUBCUTANEOUS at 20:12

## 2022-05-10 RX ADMIN — AMLODIPINE BESYLATE 10 MG: 10 TABLET ORAL at 09:25

## 2022-05-10 RX ADMIN — Medication 10 ML: at 13:34

## 2022-05-10 RX ADMIN — METRONIDAZOLE 500 MG: 500 INJECTION, SOLUTION INTRAVENOUS at 13:34

## 2022-05-10 RX ADMIN — OXYCODONE HYDROCHLORIDE 10 MG: 10 TABLET ORAL at 06:53

## 2022-05-10 RX ADMIN — METOPROLOL TARTRATE 50 MG: 50 TABLET, FILM COATED ORAL at 01:14

## 2022-05-10 RX ADMIN — LISINOPRIL 20 MG: 20 TABLET ORAL at 09:25

## 2022-05-10 RX ADMIN — CEFTRIAXONE 2000 MG: 2 INJECTION, POWDER, FOR SOLUTION INTRAMUSCULAR; INTRAVENOUS at 20:13

## 2022-05-10 ASSESSMENT — PAIN DESCRIPTION - FREQUENCY: FREQUENCY: INTERMITTENT

## 2022-05-10 ASSESSMENT — PAIN DESCRIPTION - ORIENTATION
ORIENTATION: MID;UPPER
ORIENTATION: RIGHT;MID;UPPER

## 2022-05-10 ASSESSMENT — PAIN SCALES - GENERAL
PAINLEVEL_OUTOF10: 7
PAINLEVEL_OUTOF10: 3
PAINLEVEL_OUTOF10: 8
PAINLEVEL_OUTOF10: 6
PAINLEVEL_OUTOF10: 0
PAINLEVEL_OUTOF10: 7
PAINLEVEL_OUTOF10: 8

## 2022-05-10 ASSESSMENT — PAIN DESCRIPTION - DESCRIPTORS
DESCRIPTORS: ACHING;SHARP

## 2022-05-10 ASSESSMENT — PAIN DESCRIPTION - LOCATION
LOCATION: ABDOMEN

## 2022-05-10 ASSESSMENT — PAIN DESCRIPTION - PAIN TYPE: TYPE: SURGICAL PAIN

## 2022-05-10 NOTE — PROGRESS NOTES
Order received by Surgery for one time straight cath, and defer future issues concerning retention to primary team.  1000cc obtained during straight cath. Dr. Sami Sullivan was called, RN left message concerning results of straight cath. Awaiting call back.

## 2022-05-10 NOTE — PROGRESS NOTES
Patient tachypneic, ineffective volume on inspiration. Pain medication and abdominal pillow provided for abdominal pain post surgery. 91% on 6L nasal cannula. Respiratory called for prn duoneb treatment. Patient is sitting in high fowlers and she is awake and oriented. Incentive spirometer provided and instructed on use.

## 2022-05-10 NOTE — PROGRESS NOTES
[facility]   Progress note      Subjective: The patient is awake and alert. Status post surgical intervention with subtotal lap viktor with placement of close drain    Objective:    /78   Pulse 92   Temp 97.5 °F (36.4 °C) (Oral)   Resp 24   Ht 5' 1\" (1.549 m)   Wt 252 lb 8 oz (114.5 kg)   SpO2 93%   BMI 47.71 kg/m²     In: 1300 [I.V.:1300]  Out: 215   In: 1300   Out: 215 [Drains:15]    General appearance: NAD, conversant  HEENT: AT/NC, MMM  Neck: FROM, supple  Lungs: Clear to auscultation  CV: RRR, no MRGs  Vasc: Radial pulses 2+  Abdomen: Drain in place  Extremities: No peripheral edema or digital cyanosis  Skin: no rash, lesions or ulcers  Psych: Alert and oriented to person, place and time  Neuro: Alert and interactive     Recent Labs     05/08/22  0557 05/09/22  1028 05/10/22  0558   WBC 14.0* 9.1 16.4*   HGB 11.3* 12.3 12.7   HCT 36.6 39.7 42.7    404 359       Recent Labs     05/09/22  1028 05/10/22  0558    139   K 4.0 4.8    105   CO2 19* 13*   BUN 14 18   CREATININE 0.8 1.0   CALCIUM 8.8 8.7       Assessment:    Principal Problem:    Acute cholecystitis  Active Problems:    TIM (obstructive sleep apnea)    Type 2 diabetes mellitus, without long-term current use of insulin (HCC)    Primary hypertension    Lactic acid acidosis    Renal mass    Hepatomegaly    Pleural effusion on right    Leukocytosis  Resolved Problems:    * No resolved hospital problems.  *      Plan:    Patient is a 61-year-old female admitted to [facility] for  Acute cholecystitis  -Monitor labs, CBC  -WBC 16.5 >15.1- 21k, 16,000 today  -Postop day 0, subtotal laparoscopic cholecystectomy with drain placement  -US RUQ: Hydropic gallbladder with wall thickening  -NM HIDA scan:  Positive for cholecystitis  -IV Rocephin and Flagyl-continue   -Cardiology consulted for preoperative cardiovascular assessment- cleared to proceed with core intervention as needed  -Hopefully she can be discharged tomorrow      DM type II  -Monitor labs  -ISS glucose control  -Hypoglycemia protocol initiated     New right kidney mass-concerning for renal cell carcinoma  -MRI abdomen completed 5/6/2022 with soft tissue intensity mass lesion concerning for malignancy  -Monitor kidney function-within normal limits   urology consult appreciated-recommendation noted for follow-up in [place] for retroperitoneoscopic partial nephrectomy given her overall poor health and multiple comorbidities  This is to be done after patient undergoes surgery gallbladder surgery     Hypertension  -continue home medications  -Adequately controlled     Right Pleural effusion   -monitor respiratory status   -Repeat chest x-ray today     DVT Prophylaxis   PT/OT  Discharge planning           Serene Zarate MD  11:42 AM  5/10/2022

## 2022-05-10 NOTE — OP NOTE
Operative Note      Patient: Isaias Durham  YOB: 1955  MRN: [id number]    Date of Procedure: 5/9/2022    Pre-Op Diagnosis: Acute Cholecystitis    Post-Op Diagnosis: Same       Procedure(s):  LAPAROSCOPIC SUBTOTAL  CHOLECYSTECTOMY WITH PLACEMENT OF 19F CLOSED DRAIN    Surgeon(s):  Latoya Cuevas MD    Assistant:   Resident: Jarred Carrington MD    Anesthesia: General    Estimated Blood Loss (mL): 801 mL    Complications: None    Specimens:   ID Type Source Tests Collected by Time Destination   A : gallbladder Tissue Gallbladder SURGICAL PATHOLOGY Latoya Cuevas MD 5/9/2022 2211        Implants:  * No implants in log *      Drains:   Closed/Suction Drain Right RUQ;Abdomen Bulb (Active)       Findings: severely inflamed gallbladder with omental adhesions, large floppy R lobe of liver, chronic dense inflammatory changes of entire gallbladder making exposure of infundibulum difficult, elected to perform subtotal cholecystectomy, control of cystic artery with clips and cautery, removal of 2/3 of gallbladder including back wall, control of liver bleeding and placement of surgical snow and LORRAINE drain in gallbladder fossa. Indications:    61year old female with history, physical, laboratory and imaging findings consistent with acute cholecystitis. Cholecystectomy was recommended. Risks, benefits, alternatives (and risks of alternatives) of surgery were discussed with the patient, which include but are not limited to, bleeding, infection, bile leak, bile duct injury, conversion to open, bowel injury, further procedures, hernia formation, risk of anesthesia, blood clots, pneumonia, heart attack and stroke. Patient understands these risk and agrees to proceed. PROCEDURE: The patient was taken to the operative suite and was placed on the table in the supine position. General endotracheal anesthesia was administered. An orogastric tube was placed to decompress the stomach.  The abdomen was then prepped with Chloraprep and draped in a sterile fashion. In LUQ a stab incision was made a Veress needle was easily inserted through the abdominal cavity. After confirmation of its placement using the saline drop test, a total of approximately 3 L of carbon dioxide was insufflated, creating a pneumoperitoneum. A 5 mm umbilical midline incision was made with an 11-blade scalpel. A prepared laparoscope was inserted with the trochar, and the right upper quadrant was visualized. A12-mm port was placed 2 fingerbreadths below the left costal margin, just to the left of the midline under direct visualization. No injuries noted. Two 5-mm ports were placed in the right upper quadrant. The gallbladder was attempted to be grasped at the fundus but was very dense but was able to be grasped and then the gallbladder was inverted toward the right shoulder. There was dense adhesions between omentum and the gallbladder that was taken down bluntly and bovie electrocautery. Any adhesions between the duodenum and the surface of the gallbladder. A 2nd grasper was attempted to be placed on Luis pouch but again to dense to grasp for adequate mobilization. The peritoneum over the triangle of Calot was opened using hook cautery and attempted to identify any structures that were resembling an cystic duct but due to the dense nature of the gallbladder this was unsuccessful after a significant period of time attempting. It was felt unsafe to continue and as such a subtotal cholecystectomy was performed. The lateral and medial wall of the gallbladder was divided with the hook electrocautery away from the infundibulum leaving only 1/4 of the gallbladder remaining. On the medial aspect the cystic artery was encountered and actively bleeding . It was clipped and then grasped with a maryland grasper and hook electrocautery was used to cauterize the artery.  The remaining gallbladder was taken off the gallbladder fossa with the hook electrocautery. It was very dense and edematous throughout. Once off the liver bed it was placed in laparoscopic bag and removed through the 10 mm incision site. The skin and fascial portion of the 10 mm incision site had to be expended due to the size of the gallbladder. The subxiphoid port was replaced. The right upper quadrant was visualized. Hemostasis of the liver bed was archived with hook and placement of surgical snow. The entire right upper quadrant was irrigated with saline and suctioned dry until the effluent was clear. Through the most lateral port a 15F LORRAINE drain was placed into the gallbladder fossa and then secured to the skin with 3-0 nylon. The 12-mm subxiphoid port was closed with a 0 Vicryl stitch using the [employer] device in figure-of-8 fashion. All ports were then removed under direct visualization with no bleeding noted, and the pneumoperitoneum was allowed to escape. All skin incisions were irrigated with saline and dried, and any bleeding points were cauterized. All skin incisions were closed with 4-0 Monocryl subcuticular sutures, and sterile dressings were applied. The patient tolerated the procedure well. Sponge, needle, and instrument counts were correct. The orogastric tube was replaced, and the patient was extubated and sent to the postanesthesia care unit in stable condition.      Electronically signed by Philip Kaba MD on 5/9/2022 at 11:57 PM

## 2022-05-10 NOTE — PROGRESS NOTES
RN bladder scanned patient after large incontinent episode, 585ml post void. RN contacted surgical resident with agreement to give patient more time to pass urine and to contact Primary. RN called and left message with Primary Dr. Thong Martínez, no response as of yet. RN contacted surgical resident again as patient is still not able to completely empty bladder.

## 2022-05-10 NOTE — PROGRESS NOTES
GENERAL SURGERY  DAILY PROGRESS NOTE  5/10/2022    Chief Complaint   Patient presents with    Emesis     onset 2 am with n/v. Having dry heaves and LUQ, RLQ pain       Subjective:  POD 1 for lap subtotal with viktor. Doing well tolerating diet. incisional pain. LORRAINE with SS output     Objective:  BP (!) 108/56   Pulse 84   Temp 97.7 °F (36.5 °C)   Resp 20   Ht 5' 1\" (1.549 m)   Wt 252 lb 8 oz (114.5 kg)   SpO2 93%   BMI 47.71 kg/m²     GENERAL:  Laying in bed, awake, alert, cooperative, no apparent distress  HEAD: Normocephalic, atraumatic  EYES: No sclera icterus, pupils equal  LUNGS:  No increased work of breathing  CARDIOVASCULAR:  RR  ABDOMEN:  Soft, tender over incisions, incisions are D/C/I, non-distended.  LORRAINE in RUQ with SS output   EXTREMITIES: No edema or swelling  SKIN: Warm and dry    Assessment/Plan:  61 y.o. female with acute RUQ pain with intractable nausea       Post lap subtotal   Continue diet   Pain and nausea control   Supportive care, continue LORRAINE drain, monitor outputs   Ambulate as able  Pulmonary hygiene        Electronically signed by Christa Jimenez DO on 5/10/2022 at 7:28 AM

## 2022-05-10 NOTE — ANESTHESIA POSTPROCEDURE EVALUATION
Department of Anesthesiology  Postprocedure Note    Patient: Franca Novak  MRN: [id number]  YOB: 1955  Date of evaluation: 5/9/2022  Time:  11:19 PM     Procedure Summary     Date: 05/09/22 Room / Location: [place] [address] / [facility]    Anesthesia Start: 2105 Anesthesia Stop: 2302    Procedure: LAPAROSCOPIC SUBTOTAL  CHOLECYSTECTOMY WITH PLACEMENT OF 19F CLOSED DRAIN (N/A ) Diagnosis: (.)    Surgeons: Jovanny Beal MD Responsible Provider: Nirav Lima MD    Anesthesia Type: general ASA Status: 3          Anesthesia Type: No value filed. Gadiel Phase I: Gadiel Score: 9    Gadiel Phase II:      Last vitals: Reviewed and per EMR flowsheets.        Anesthesia Post Evaluation    Patient location during evaluation: PACU  Patient participation: complete - patient participated  Level of consciousness: awake and alert  Airway patency: patent  Nausea & Vomiting: no nausea and no vomiting  Complications: no  Cardiovascular status: blood pressure returned to baseline and hemodynamically stable  Respiratory status: acceptable and spontaneous ventilation  Hydration status: euvolemic  Multimodal analgesia pain management approach

## 2022-05-10 NOTE — CARE COORDINATION
Care Coordination  The patient is pst op day #1 LAPAROSCOPIC SUBTOTAL  CHOLECYSTECTOMY WITH PLACEMENT OF 19F CLOSED DRAIN. For Acute Cholecystitis. Post op general diet and po rafal ir 5-10 mg po q4 prn . The patient is on ivf at 100 cc hr. Iv rocephin  2000 mg iv q24 hr and iv Flagyl 50 mg iv q8 hrs. The plan is for the patient to return home with her family . Her son is her ride home. PT ampac score is 24/24.

## 2022-05-11 LAB
ALBUMIN SERPL-MCNC: 2.8 G/DL (ref 3.5–5.2)
ALP BLD-CCNC: 155 U/L (ref 35–104)
ALT SERPL-CCNC: 90 U/L (ref 0–32)
ANION GAP SERPL CALCULATED.3IONS-SCNC: 13 MMOL/L (ref 7–16)
AST SERPL-CCNC: 34 U/L (ref 0–31)
BILIRUB SERPL-MCNC: 0.3 MG/DL (ref 0–1.2)
BILIRUBIN DIRECT: <0.2 MG/DL (ref 0–0.3)
BILIRUBIN, INDIRECT: ABNORMAL MG/DL (ref 0–1)
BLOOD CULTURE, ROUTINE: NORMAL
BUN BLDV-MCNC: 16 MG/DL (ref 6–23)
CALCIUM SERPL-MCNC: 9.1 MG/DL (ref 8.6–10.2)
CHLORIDE BLD-SCNC: 104 MMOL/L (ref 98–107)
CO2: 21 MMOL/L (ref 22–29)
CREAT SERPL-MCNC: 1 MG/DL (ref 0.5–1)
CULTURE, BLOOD 2: NORMAL
GFR AFRICAN AMERICAN: >60
GFR NON-AFRICAN AMERICAN: 56 ML/MIN/1.73
GLUCOSE BLD-MCNC: 181 MG/DL (ref 74–99)
HCT VFR BLD CALC: 40.3 % (ref 34–48)
HEMOGLOBIN: 12.8 G/DL (ref 11.5–15.5)
MCH RBC QN AUTO: 27.1 PG (ref 26–35)
MCHC RBC AUTO-ENTMCNC: 31.8 % (ref 32–34.5)
MCV RBC AUTO: 85.4 FL (ref 80–99.9)
METER GLUCOSE: 165 MG/DL (ref 74–99)
METER GLUCOSE: 169 MG/DL (ref 74–99)
METER GLUCOSE: 189 MG/DL (ref 74–99)
METER GLUCOSE: 202 MG/DL (ref 74–99)
PDW BLD-RTO: 15.5 FL (ref 11.5–15)
PLATELET # BLD: 416 E9/L (ref 130–450)
PMV BLD AUTO: 9.4 FL (ref 7–12)
POTASSIUM SERPL-SCNC: 3.8 MMOL/L (ref 3.5–5)
RBC # BLD: 4.72 E12/L (ref 3.5–5.5)
SODIUM BLD-SCNC: 138 MMOL/L (ref 132–146)
TOTAL PROTEIN: 6.6 G/DL (ref 6.4–8.3)
WBC # BLD: 18.8 E9/L (ref 4.5–11.5)

## 2022-05-11 PROCEDURE — 6370000000 HC RX 637 (ALT 250 FOR IP): Performed by: NURSE PRACTITIONER

## 2022-05-11 PROCEDURE — 2580000003 HC RX 258: Performed by: STUDENT IN AN ORGANIZED HEALTH CARE EDUCATION/TRAINING PROGRAM

## 2022-05-11 PROCEDURE — 97165 OT EVAL LOW COMPLEX 30 MIN: CPT

## 2022-05-11 PROCEDURE — 6360000002 HC RX W HCPCS: Performed by: STUDENT IN AN ORGANIZED HEALTH CARE EDUCATION/TRAINING PROGRAM

## 2022-05-11 PROCEDURE — 36415 COLL VENOUS BLD VENIPUNCTURE: CPT

## 2022-05-11 PROCEDURE — 85027 COMPLETE CBC AUTOMATED: CPT

## 2022-05-11 PROCEDURE — 97535 SELF CARE MNGMENT TRAINING: CPT

## 2022-05-11 PROCEDURE — 6370000000 HC RX 637 (ALT 250 FOR IP): Performed by: STUDENT IN AN ORGANIZED HEALTH CARE EDUCATION/TRAINING PROGRAM

## 2022-05-11 PROCEDURE — 2140000000 HC CCU INTERMEDIATE R&B

## 2022-05-11 PROCEDURE — 2700000000 HC OXYGEN THERAPY PER DAY

## 2022-05-11 PROCEDURE — 82962 GLUCOSE BLOOD TEST: CPT

## 2022-05-11 PROCEDURE — 80076 HEPATIC FUNCTION PANEL: CPT

## 2022-05-11 PROCEDURE — 2500000003 HC RX 250 WO HCPCS: Performed by: STUDENT IN AN ORGANIZED HEALTH CARE EDUCATION/TRAINING PROGRAM

## 2022-05-11 PROCEDURE — 97530 THERAPEUTIC ACTIVITIES: CPT

## 2022-05-11 PROCEDURE — 80048 BASIC METABOLIC PNL TOTAL CA: CPT

## 2022-05-11 RX ADMIN — FAMOTIDINE 20 MG: 20 TABLET ORAL at 21:47

## 2022-05-11 RX ADMIN — INSULIN LISPRO 2 UNITS: 100 INJECTION, SOLUTION INTRAVENOUS; SUBCUTANEOUS at 09:58

## 2022-05-11 RX ADMIN — INSULIN LISPRO 2 UNITS: 100 INJECTION, SOLUTION INTRAVENOUS; SUBCUTANEOUS at 16:15

## 2022-05-11 RX ADMIN — Medication 10 ML: at 09:59

## 2022-05-11 RX ADMIN — ASPIRIN 325 MG: 325 TABLET, COATED ORAL at 09:59

## 2022-05-11 RX ADMIN — TORSEMIDE 20 MG: 20 TABLET ORAL at 09:59

## 2022-05-11 RX ADMIN — OXYCODONE HYDROCHLORIDE 10 MG: 10 TABLET ORAL at 21:47

## 2022-05-11 RX ADMIN — METOPROLOL TARTRATE 50 MG: 50 TABLET, FILM COATED ORAL at 21:47

## 2022-05-11 RX ADMIN — Medication 1 TABLET: at 09:59

## 2022-05-11 RX ADMIN — METOPROLOL TARTRATE 50 MG: 50 TABLET, FILM COATED ORAL at 09:59

## 2022-05-11 RX ADMIN — METRONIDAZOLE 500 MG: 500 INJECTION, SOLUTION INTRAVENOUS at 12:18

## 2022-05-11 RX ADMIN — ATORVASTATIN CALCIUM 20 MG: 20 TABLET, FILM COATED ORAL at 21:47

## 2022-05-11 RX ADMIN — INSULIN LISPRO 2 UNITS: 100 INJECTION, SOLUTION INTRAVENOUS; SUBCUTANEOUS at 21:00

## 2022-05-11 RX ADMIN — METRONIDAZOLE 500 MG: 500 INJECTION, SOLUTION INTRAVENOUS at 21:48

## 2022-05-11 RX ADMIN — ISOSORBIDE MONONITRATE 30 MG: 30 TABLET, EXTENDED RELEASE ORAL at 09:59

## 2022-05-11 RX ADMIN — METRONIDAZOLE 500 MG: 500 INJECTION, SOLUTION INTRAVENOUS at 04:33

## 2022-05-11 RX ADMIN — AMLODIPINE BESYLATE 10 MG: 10 TABLET ORAL at 09:59

## 2022-05-11 RX ADMIN — LISINOPRIL 20 MG: 20 TABLET ORAL at 09:59

## 2022-05-11 RX ADMIN — SODIUM CHLORIDE: 9 INJECTION, SOLUTION INTRAVENOUS at 21:53

## 2022-05-11 RX ADMIN — ENOXAPARIN SODIUM 30 MG: 100 INJECTION SUBCUTANEOUS at 09:59

## 2022-05-11 RX ADMIN — ENOXAPARIN SODIUM 30 MG: 100 INJECTION SUBCUTANEOUS at 21:47

## 2022-05-11 RX ADMIN — CEFTRIAXONE 2000 MG: 2 INJECTION, POWDER, FOR SOLUTION INTRAMUSCULAR; INTRAVENOUS at 21:00

## 2022-05-11 RX ADMIN — FAMOTIDINE 20 MG: 20 TABLET ORAL at 09:59

## 2022-05-11 RX ADMIN — ONDANSETRON 4 MG: 2 INJECTION INTRAMUSCULAR; INTRAVENOUS at 10:46

## 2022-05-11 ASSESSMENT — PAIN SCALES - GENERAL
PAINLEVEL_OUTOF10: 9
PAINLEVEL_OUTOF10: 0
PAINLEVEL_OUTOF10: 2
PAINLEVEL_OUTOF10: 0

## 2022-05-11 ASSESSMENT — PAIN SCALES - WONG BAKER: WONGBAKER_NUMERICALRESPONSE: 0

## 2022-05-11 ASSESSMENT — PAIN DESCRIPTION - LOCATION: LOCATION: ABDOMEN;BACK;LEG

## 2022-05-11 ASSESSMENT — PAIN - FUNCTIONAL ASSESSMENT: PAIN_FUNCTIONAL_ASSESSMENT: PREVENTS OR INTERFERES SOME ACTIVE ACTIVITIES AND ADLS

## 2022-05-11 ASSESSMENT — PAIN DESCRIPTION - DESCRIPTORS: DESCRIPTORS: DISCOMFORT;POUNDING

## 2022-05-11 ASSESSMENT — PAIN DESCRIPTION - ORIENTATION: ORIENTATION: LEFT;RIGHT

## 2022-05-11 NOTE — PROGRESS NOTES
[facility]   Progress note      Subjective:    She is having nausea and small amounts of vomitus this morning  Feels like she has distention and cramping in her abdomen and has not yet passed gas    Objective:    BP (!) 155/61   Pulse 88   Temp 98.5 °F (36.9 °C) (Temporal)   Resp 20   Ht 5' 1\" (1.549 m)   Wt 249 lb 3.2 oz (113 kg)   SpO2 92%   BMI 47.09 kg/m²     In: 450 [P.O.:450]  Out: 2045   In: 450   Out: 2045 [Urine:1750; Drains:295]    General appearance: NAD, conversant  HEENT: AT/NC, MMM  Neck: FROM, supple  Lungs: Clear to auscultation  CV: RRR, no MRGs  Vasc: Radial pulses 2+  Abdomen: Drain in place with bilious output  Extremities: No peripheral edema or digital cyanosis  Skin: no rash, lesions or ulcers  Psych: Alert and oriented to person, place and time  Neuro: Alert and interactive     Recent Labs     05/09/22  1028 05/10/22  0558 05/11/22  0410   WBC 9.1 16.4* 18.8*   HGB 12.3 12.7 12.8   HCT 39.7 42.7 40.3    359 416       Recent Labs     05/09/22  1028 05/10/22  0558 05/11/22  0410    139 138   K 4.0 4.8 3.8    105 104   CO2 19* 13* 21*   BUN 14 18 16   CREATININE 0.8 1.0 1.0   CALCIUM 8.8 8.7 9.1       Assessment:    Principal Problem:    Acute cholecystitis  Active Problems:    TIM (obstructive sleep apnea)    Type 2 diabetes mellitus, without long-term current use of insulin (HCC)    Primary hypertension    Lactic acid acidosis    Renal mass    Hepatomegaly    Pleural effusion on right    Leukocytosis  Resolved Problems:    * No resolved hospital problems.  *      Plan:    Patient is a 19-year-old female admitted to [facility] for    Acute cholecystitis  -Monitor labs, CBC-within normal limits  -WBC 16.5 >15.1- 21k, elevated to 19,000 today-likely reactive  -5/9/2022 subtotal laparoscopic cholecystectomy with drain placement  -Per general surgery, may need ERCP with stenting  -US RUQ: Hydropic gallbladder with wall thickening  -NM HIDA scan: Positive for cholecystitis  -IV Rocephin and Flagyl-continue   -Cardiology consulted for preoperative cardiovascular assessment- cleared to proceed with core intervention as needed      DM type II  -Monitor labs  -ISS glucose control  -Hypoglycemia protocol initiated     New right kidney mass-concerning for renal cell carcinoma  -MRI abdomen completed 5/6/2022 with soft tissue intensity mass lesion concerning for malignancy  -Monitor kidney function-within normal limits   urology consult appreciated-recommendation noted for follow-up in [facility] [employer] for retroperitoneoscopic partial nephrectomy given her overall poor health and multiple comorbidities  This is to be done after patient undergoes surgery gallbladder surgery     Hypertension  -continue home medications  -Adequately controlled     Right Pleural effusion   -monitor respiratory status   -Repeat chest x-ray today     DVT Prophylaxis   PT/OT  Discharge planning           Hortencia Guerrero MD  12:53 PM  5/11/2022

## 2022-05-11 NOTE — PROGRESS NOTES
Occupational Therapy  OCCUPATIONAL THERAPY INITIAL EVALUATION    [facility] [address]      Date:2022                                                Patient Name: Bao Burnett  MRN: [id number]  : 1955  Room: [address] [place] [address] [id number]    Referring Provider: Magnus Habermann, MD  Specific Provider Orders/Date: OT eval and treat 22     Diagnosis: Acute cholecystitis [K81.0]  Ventral hernia without obstruction or gangrene [K43.9]  Renal mass [N28.89]   Pt admitted to hospital on 22 for n/v and abdominal pain    Surgery / Procedure: LAPAROSCOPIC SUBTOTAL  CHOLECYSTECTOMY WITH PLACEMENT of DRAIN on     Pertinent Medical History:  has a past medical history of Allergic rhinitis, Cerebrovascular disease, Cerebrovascular disease, Diabetes ([facility] [address] Hyperlipidemia, Hypertension, Irregular heart beat, Myocardial infarct ([facility] [address] Neuropathy, Obesity, Sleep apnea, Thyroid disease, and Weakness due to cerebrovascular accident (CVA).        Precautions:  Fall Risk, abdominal, LORRAINE drain, O2, bed alarm    Assessment of current deficits   [x] Functional mobility  [x]ADLs  [x] Strength               []Cognition    [x] Functional transfers   [x] IADLs         [x] Safety Awareness   [x]Endurance    [] Fine Coordination              [x] Balance      [] Vision/perception   []Sensation     []Gross Motor Coordination  [] ROM  [] Delirium                   [] Motor Control     OT PLAN OF CARE   OT POC based on physician orders, patient diagnosis and results of clinical assessment    Frequency/Duration 1-3 days/wk for 2 weeks PRN   Specific OT Treatment Interventions to include:   * Instruction/training on adapted ADL techniques and AE recommendations to increase functional independence within precautions       * Training on energy conservation strategies, correct breathing pattern and techniques to improve independence/tolerance for self-care routine  * Functional transfer/mobility training/DME recommendations for increased independence, safety, and fall prevention  * Patient/Family education to increase follow through with safety techniques and functional independence  * Recommendation of environmental modifications for increased safety with functional transfers/mobility and ADLs  * Therapeutic exercise to improve motor endurance, ROM, and functional strength for ADLs/functional transfers  * Therapeutic activities to facilitate/challenge dynamic balance, stand tolerance for increased safety and independence with ADLs  * Therapeutic activities to facilitate gross/fine motor skills for increased independence with ADLs    Recommended Adaptive Equipment: AE for LB self-care tasks, TBD for additional AE    Home Living: Pt lives w/ family in 2 floor home. 5-6 LUCERO, 2 handrails  Full bath and bedroom on 2nd floor - flight of stairs    Bathroom setup: walk in shower with grab bars    Equipment owned: quad cane    Prior Level of Function: independent with ADLs , shares with IADLs; ambulated independently w/o AD   Driving: yes    Pain Level: Pt c/o 7-8/10 abdominal pain this session ; increased w/ activity.  Reinforced pain management strategies including abdominal precautions    Cognition: A&O: 4/4; Follows 1 step directions   Memory:  good    Sequencing:  good    Problem solving:  fair    Judgement/safety:  fair      Functional Assessment:  AM-PAC Daily Activity Raw Score: 14/24   Initial Eval Status  Date: 5/11/22 Treatment Status  Date: STGs = LTGs  Time frame: 10-14 days   Feeding Supervision   Modified Ritchie    Grooming Stand by Assist   Seated EOB  Modified Ritchie    UB Dressing Minimal Assist   Modified Ritchie    LB Dressing Dependent   Minimal Assist    Bathing Moderate Assist  Minimal Assist    Toileting Maximal Assist   Minimal Assist    Bed Mobility  Rolling: Min A  Supine to sit: Moderate Assist management and safety. Limited by pain - reinforced pain management). Therapist facilitated self-care retraining: UB/LB self-care tasks (gown, socks), toileting task and seated grooming tasks while educating/cuing pt on modified techniques within precautions, posture, safety and energy conservation techniques. Skilled monitoring of HR, O2 sats and pts response to treatment. Rehab Potential: Good for established goals     Patient / Family Goal: not stated     Patient and/or family were instructed on functional diagnosis, prognosis/goals and OT plan of care. Demonstrated fair understanding. Eval Complexity: Low    Time In: 14:25  Time Out: 15:02  Total Treatment Time: 24 minutes    Min Units   OT Eval Low 97165  X  1   OT Eval Medium 80964      OT Eval High 34637      OT Re-Eval [id number]       Therapeutic Ex 14165       Therapeutic Activities 43298 9  1   ADL/Self Care 37080  15  1   Orthotic Management 07002       Manual [id number]     Neuro Re-Ed 05987       Non-Billable Time          Evaluation Time additionally includes thorough review of current medical information, gathering information on past medical history/social history and prior level of function, interpretation of standardized testing/informal observation of tasks, assessment of data and development of plan of care and goals.         ROCÍO ToureR/L #[id number]

## 2022-05-11 NOTE — PROGRESS NOTES
GENERAL SURGERY  DAILY PROGRESS NOTE  5/11/2022    Chief Complaint   Patient presents with    Emesis     onset 2 am with n/v. Having dry heaves and LUQ, RLQ pain       Subjective:  POD 2 for lap subtotal with viktor. Resting comfortably. Doing well tolerating diet. incisional pain. Lequita Lively now with biliary drainage. Decreased N/V. Valencia placed yesterday    Objective:  BP (!) 151/79   Pulse 79   Temp 97.3 °F (36.3 °C) (Infrared)   Resp 18   Ht 5' 1\" (1.549 m)   Wt 249 lb 3.2 oz (113 kg)   SpO2 93%   BMI 47.09 kg/m²     GENERAL:  Laying in bed, awake, alert, cooperative, no apparent distress  LUNGS:  No increased work of breathing  CARDIOVASCULAR:  RR  ABDOMEN:  Soft, tender over incisions, incisions are D/C/I, non-distended. LORRAINE in RUQ with SS output   EXTREMITIES: No edema or swelling  SKIN: Warm and dry    Assessment/Plan:  61 y.o. female with acute RUQ pain with intractable nausea       Post lap subtotal   Continue diet   Due to bilious output will continue LORRAINE drain. She will discharge with this. After a few weeks the bile output should resolve with time.   Pain and nausea control   Supportive care, monitor outputs   Ambulate as able  Pulmonary hygiene    D/w Dr. Robert Ge  Electronically signed by Severo Katz DO on 5/11/2022 at 6:41 AM     Continue LORRAINE drain  Low fat diet as tolerated  Ok for discharge from Surgery standpoint  Follow up with Dr. Robert Ge in 2 weeks for postop check and possible drain removal.    Electronically signed by Annel Maria DO on 5/11/2022 at 3:42 PM

## 2022-05-11 NOTE — PLAN OF CARE
Problem: Chronic Conditions and Co-morbidities  Goal: Patient's chronic conditions and co-morbidity symptoms are monitored and maintained or improved  Outcome: Progressing     Problem: Discharge Planning  Goal: Discharge to home or other facility with appropriate resources  Outcome: Progressing     Problem: Pain  Goal: Verbalizes/displays adequate comfort level or baseline comfort level  Outcome: Progressing     Problem: Safety - Adult  Goal: Free from fall injury  Outcome: Progressing

## 2022-05-12 ENCOUNTER — APPOINTMENT (OUTPATIENT)
Dept: GENERAL RADIOLOGY | Age: 67
DRG: 418 | End: 2022-05-12
Payer: MEDICARE

## 2022-05-12 LAB
ALBUMIN SERPL-MCNC: 2.7 G/DL (ref 3.5–5.2)
ALP BLD-CCNC: 120 U/L (ref 35–104)
ALT SERPL-CCNC: 53 U/L (ref 0–32)
ANION GAP SERPL CALCULATED.3IONS-SCNC: 11 MMOL/L (ref 7–16)
AST SERPL-CCNC: 16 U/L (ref 0–31)
BILIRUB SERPL-MCNC: 0.4 MG/DL (ref 0–1.2)
BILIRUBIN DIRECT: <0.2 MG/DL (ref 0–0.3)
BILIRUBIN, INDIRECT: ABNORMAL MG/DL (ref 0–1)
BUN BLDV-MCNC: 15 MG/DL (ref 6–23)
CALCIUM SERPL-MCNC: 8.7 MG/DL (ref 8.6–10.2)
CHLORIDE BLD-SCNC: 108 MMOL/L (ref 98–107)
CO2: 23 MMOL/L (ref 22–29)
CREAT SERPL-MCNC: 1.1 MG/DL (ref 0.5–1)
GFR AFRICAN AMERICAN: >60
GFR NON-AFRICAN AMERICAN: 50 ML/MIN/1.73
GLUCOSE BLD-MCNC: 144 MG/DL (ref 74–99)
HCT VFR BLD CALC: 39.7 % (ref 34–48)
HEMOGLOBIN: 12.4 G/DL (ref 11.5–15.5)
MCH RBC QN AUTO: 27 PG (ref 26–35)
MCHC RBC AUTO-ENTMCNC: 31.2 % (ref 32–34.5)
MCV RBC AUTO: 86.5 FL (ref 80–99.9)
METER GLUCOSE: 125 MG/DL (ref 74–99)
METER GLUCOSE: 208 MG/DL (ref 74–99)
METER GLUCOSE: 209 MG/DL (ref 74–99)
METER GLUCOSE: 277 MG/DL (ref 74–99)
PDW BLD-RTO: 15.9 FL (ref 11.5–15)
PLATELET # BLD: 376 E9/L (ref 130–450)
PMV BLD AUTO: 9.5 FL (ref 7–12)
POTASSIUM SERPL-SCNC: 3.3 MMOL/L (ref 3.5–5)
RBC # BLD: 4.59 E12/L (ref 3.5–5.5)
SODIUM BLD-SCNC: 142 MMOL/L (ref 132–146)
TOTAL PROTEIN: 6 G/DL (ref 6.4–8.3)
WBC # BLD: 15.3 E9/L (ref 4.5–11.5)

## 2022-05-12 PROCEDURE — 6370000000 HC RX 637 (ALT 250 FOR IP): Performed by: STUDENT IN AN ORGANIZED HEALTH CARE EDUCATION/TRAINING PROGRAM

## 2022-05-12 PROCEDURE — 71045 X-RAY EXAM CHEST 1 VIEW: CPT

## 2022-05-12 PROCEDURE — 2580000003 HC RX 258: Performed by: STUDENT IN AN ORGANIZED HEALTH CARE EDUCATION/TRAINING PROGRAM

## 2022-05-12 PROCEDURE — 85027 COMPLETE CBC AUTOMATED: CPT

## 2022-05-12 PROCEDURE — 51798 US URINE CAPACITY MEASURE: CPT

## 2022-05-12 PROCEDURE — 2140000000 HC CCU INTERMEDIATE R&B

## 2022-05-12 PROCEDURE — 6360000002 HC RX W HCPCS: Performed by: STUDENT IN AN ORGANIZED HEALTH CARE EDUCATION/TRAINING PROGRAM

## 2022-05-12 PROCEDURE — 36415 COLL VENOUS BLD VENIPUNCTURE: CPT

## 2022-05-12 PROCEDURE — 6370000000 HC RX 637 (ALT 250 FOR IP): Performed by: NURSE PRACTITIONER

## 2022-05-12 PROCEDURE — 80076 HEPATIC FUNCTION PANEL: CPT

## 2022-05-12 PROCEDURE — 2500000003 HC RX 250 WO HCPCS: Performed by: STUDENT IN AN ORGANIZED HEALTH CARE EDUCATION/TRAINING PROGRAM

## 2022-05-12 PROCEDURE — 2700000000 HC OXYGEN THERAPY PER DAY

## 2022-05-12 PROCEDURE — 80048 BASIC METABOLIC PNL TOTAL CA: CPT

## 2022-05-12 PROCEDURE — 82962 GLUCOSE BLOOD TEST: CPT

## 2022-05-12 RX ORDER — BISACODYL 10 MG
10 SUPPOSITORY, RECTAL RECTAL DAILY PRN
Status: DISCONTINUED | OUTPATIENT
Start: 2022-05-12 | End: 2022-05-13 | Stop reason: HOSPADM

## 2022-05-12 RX ORDER — POLYETHYLENE GLYCOL 3350 17 G/17G
17 POWDER, FOR SOLUTION ORAL DAILY
Status: DISCONTINUED | OUTPATIENT
Start: 2022-05-12 | End: 2022-05-13 | Stop reason: HOSPADM

## 2022-05-12 RX ORDER — SENNA AND DOCUSATE SODIUM 50; 8.6 MG/1; MG/1
2 TABLET, FILM COATED ORAL DAILY
Status: DISCONTINUED | OUTPATIENT
Start: 2022-05-12 | End: 2022-05-13 | Stop reason: HOSPADM

## 2022-05-12 RX ADMIN — Medication 10 ML: at 20:05

## 2022-05-12 RX ADMIN — Medication 10 ML: at 07:47

## 2022-05-12 RX ADMIN — AMLODIPINE BESYLATE 10 MG: 10 TABLET ORAL at 07:46

## 2022-05-12 RX ADMIN — TORSEMIDE 20 MG: 20 TABLET ORAL at 07:46

## 2022-05-12 RX ADMIN — FAMOTIDINE 20 MG: 20 TABLET ORAL at 07:47

## 2022-05-12 RX ADMIN — METOPROLOL TARTRATE 50 MG: 50 TABLET, FILM COATED ORAL at 20:03

## 2022-05-12 RX ADMIN — SODIUM CHLORIDE: 9 INJECTION, SOLUTION INTRAVENOUS at 05:39

## 2022-05-12 RX ADMIN — ASPIRIN 325 MG: 325 TABLET, COATED ORAL at 07:46

## 2022-05-12 RX ADMIN — POLYETHYLENE GLYCOL 3350 17 G: 17 POWDER, FOR SOLUTION ORAL at 07:46

## 2022-05-12 RX ADMIN — ISOSORBIDE MONONITRATE 30 MG: 30 TABLET, EXTENDED RELEASE ORAL at 07:46

## 2022-05-12 RX ADMIN — ENOXAPARIN SODIUM 30 MG: 100 INJECTION SUBCUTANEOUS at 07:47

## 2022-05-12 RX ADMIN — OXYCODONE 5 MG: 5 TABLET ORAL at 20:04

## 2022-05-12 RX ADMIN — INSULIN LISPRO 2 UNITS: 100 INJECTION, SOLUTION INTRAVENOUS; SUBCUTANEOUS at 22:20

## 2022-05-12 RX ADMIN — ATORVASTATIN CALCIUM 20 MG: 20 TABLET, FILM COATED ORAL at 20:03

## 2022-05-12 RX ADMIN — ENOXAPARIN SODIUM 30 MG: 100 INJECTION SUBCUTANEOUS at 20:03

## 2022-05-12 RX ADMIN — METRONIDAZOLE 500 MG: 500 INJECTION, SOLUTION INTRAVENOUS at 05:39

## 2022-05-12 RX ADMIN — FAMOTIDINE 20 MG: 20 TABLET ORAL at 20:03

## 2022-05-12 RX ADMIN — INSULIN LISPRO 4 UNITS: 100 INJECTION, SOLUTION INTRAVENOUS; SUBCUTANEOUS at 16:21

## 2022-05-12 RX ADMIN — METRONIDAZOLE 500 MG: 500 INJECTION, SOLUTION INTRAVENOUS at 11:32

## 2022-05-12 RX ADMIN — METOPROLOL TARTRATE 50 MG: 50 TABLET, FILM COATED ORAL at 07:46

## 2022-05-12 RX ADMIN — Medication 1 TABLET: at 07:46

## 2022-05-12 RX ADMIN — LISINOPRIL 20 MG: 20 TABLET ORAL at 07:46

## 2022-05-12 RX ADMIN — INSULIN LISPRO 4 UNITS: 100 INJECTION, SOLUTION INTRAVENOUS; SUBCUTANEOUS at 11:31

## 2022-05-12 RX ADMIN — SENNOSIDES AND DOCUSATE SODIUM 2 TABLET: 50; 8.6 TABLET ORAL at 07:47

## 2022-05-12 ASSESSMENT — PAIN SCALES - GENERAL
PAINLEVEL_OUTOF10: 0
PAINLEVEL_OUTOF10: 7

## 2022-05-12 ASSESSMENT — PAIN - FUNCTIONAL ASSESSMENT: PAIN_FUNCTIONAL_ASSESSMENT: ACTIVITIES ARE NOT PREVENTED

## 2022-05-12 ASSESSMENT — PAIN SCALES - WONG BAKER
WONGBAKER_NUMERICALRESPONSE: 0
WONGBAKER_NUMERICALRESPONSE: 0

## 2022-05-12 ASSESSMENT — PAIN DESCRIPTION - LOCATION: LOCATION: BACK

## 2022-05-12 ASSESSMENT — PAIN DESCRIPTION - DESCRIPTORS: DESCRIPTORS: DISCOMFORT;SHOOTING

## 2022-05-12 NOTE — CARE COORDINATION
Discharge order noted. Patient to discharge home. Ambulating pulse-ox test completed and patient required 5L NC to recover. Provided list of DME companies and patient has no preference on what company is used. Patient reports her bedroom is on the 2nd floor and she may have trouble lifting the tank. Home care services offered; patient declined. Patient reports her family will transport home. Awaiting oxygen orders and charge nurse checking if patient is still okay to discharge home. Will make referral for home oxygen when orders are in. The Plan for Transition of Care is related to the following treatment goals: discharge planning    The Patient and/or patient representative Yarelis Rashid was provided with a choice of provider and agrees with the discharge plan. [x] Yes [] No     Freedom of choice list was provided with basic dialogue that supports the patient's individualized plan of care/goals, treatment preferences and shares the quality data associated with the providers.  [x] Yes [] No    Chon Singletary MSW, LSW [phone]
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
## 2022-05-12 NOTE — PROGRESS NOTES
Comprehensive Nutrition Assessment    Type and Reason for Visit:  Initial,RD Nutrition Re-Screen/LOS    Nutrition Recommendations/Plan:   1. Continue current diet  2. Start Ensure HP BID  3. Will monitor     Malnutrition Assessment:  Malnutrition Status: At risk for malnutrition (Comment) (05/12/22 3277)    Context:  Acute Illness     Findings of the 6 clinical characteristics of malnutrition:  Energy Intake:  75% or less of estimated energy requirements for 7 or more days  Weight Loss:  No significant weight loss     Body Fat Loss:  No significant body fat loss     Muscle Mass Loss:  No significant muscle mass loss    Fluid Accumulation:  No significant fluid accumulation     Strength:  Not Performed    Nutrition Assessment:    pt adm d/t emesis + abd pain; pt w/ N/V and R-kidney mass concerning for CA; hx of DM, HTN, MI, CVA, obesity; pt s/p subtotal lap viktor w/ placement of close drain 5/9; pt w/ acute cholescystitis + R-pleaural effusion; pt w/ limited intake data recorded; will start ONS to aid in surgical wound healing + monitor. Nutrition Related Findings:    A&Ox4; active BS; no edema Wound Type: Surgical Incision (abd)       Current Nutrition Intake & Therapies:    Average Meal Intake: 26-50%,51-75% (limited data)  Average Supplements Intake: None Ordered  ADULT DIET; Regular; 4 carb choices (60 gm/meal); Low Fat (less than or equal to 50 gm/day)  ADULT ORAL NUTRITION SUPPLEMENT; Breakfast, Lunch; Low Calorie/High Protein Oral Supplement    Anthropometric Measures:  Height: 5' 1\" (154.9 cm)  Ideal Body Weight (IBW): 105 lbs (48 kg)       Current Body Weight: 234 lb 6.4 oz (106.3 kg) (5/9-), 223.2 % IBW.     Current BMI (kg/m2): 44.3  Usual Body Weight: 239 lb (108.4 kg) (3/9/22-[facility] office visit)  % Weight Change (Calculated): -1.9  Weight Adjustment For: No Adjustment                 BMI Categories: Obese Class 3 (BMI 40.0 or greater)    Estimated Daily Nutrient Needs:  Energy Requirements Based On: Kcal/kg  Weight Used for Energy Requirements: Current  Energy (kcal/day): 13-15kcal/dffLCW=7040-2005  Weight Used for Protein Requirements: Ideal  Protein (g/day): 2.0-2.2g/kgxIBW=95-105g  Method Used for Fluid Requirements: 1 ml/kcal  Fluid (ml/day): 4827-7301    Nutrition Diagnosis:   · Inadequate oral intake related to pain (abd pain) as evidenced by nausea,vomiting,intake 0-25%,poor intake prior to admission      Nutrition Interventions:   Food and/or Nutrient Delivery: Continue Current Diet,Start Oral Nutrition Supplement (Ensure HP BID)  Nutrition Education/Counseling: No recommendation at this time  Coordination of Nutrition Care: Continue to monitor while inpatient       Goals:     Goals: PO intake 50% or greater       Nutrition Monitoring and Evaluation:   Behavioral-Environmental Outcomes: None Identified  Food/Nutrient Intake Outcomes: Food and Nutrient Intake,Supplement Intake  Physical Signs/Symptoms Outcomes: Biochemical Data,Nutrition Focused Physical Findings,Skin,Weight,Chewing or Swallowing,GI Status,Fluid Status or Edema,Nausea or Vomiting    Discharge Planning:     Too soon to determine     Dinesh Cheng RD  Contact: [phone]

## 2022-05-12 NOTE — PROGRESS NOTES
Pulse ox was 88% on room air at rest.  Ambulated patient on room air. Oxygen saturation was 86% on room air while ambulating. Oxygen applied. Recovery pulse ox was 92% on 5 liters of oxygen while ambulating.

## 2022-05-12 NOTE — PROGRESS NOTES
Per Dr. Jarett Cross, if patient is unable to void or have bowel movement over the next couple hours we will hold discharge. Notified her of CXR results. Possible re-insertion of mar if unable to void and have Urology see patient again.

## 2022-05-12 NOTE — PROGRESS NOTES
GENERAL SURGERY  DAILY PROGRESS NOTE  5/12/2022    Chief Complaint   Patient presents with    Emesis     onset 2 am with n/v. Having dry heaves and LUQ, RLQ pain       Subjective:  POD 3 for lap subtotal with viktor. Resting comfortably. Doing well tolerating diet. incisional pain. Lee Almanzar now with biliary drainage. Decreased N/V, but with an episode lastnight. Passing gas, but without BM over the last few days     Objective:  BP (!) 147/80   Pulse 78   Temp 98.1 °F (36.7 °C) (Oral)   Resp 16   Ht 5' 1\" (1.549 m)   Wt 241 lb 9.6 oz (109.6 kg)   SpO2 98%   BMI 45.65 kg/m²     GENERAL:  Laying in bed, awake, alert, cooperative, no apparent distress  LUNGS:  No increased work of breathing  CARDIOVASCULAR:  RR  ABDOMEN:  Soft, tender over incisions, incisions are D/C/I, non-distended. LORRAINE in RUQ with SS output   EXTREMITIES: No edema or swelling  SKIN: Warm and dry    Assessment/Plan:  61 y.o. female with acute RUQ pain with intractable nausea       Post lap subtotal   Continue diet   Due to bilious output will continue LORRAINE drain. She will discharge with this. After a few weeks the bile output should resolve with time.   Pain and nausea control   Bowel regimen   Supportive care, monitor outputs   Ambulate as able  Pulmonary hygiene    D/w Dr. Amari Gupta    Electronically signed by Daksha Neves DO on 5/12/2022 at 6:37 AM

## 2022-05-12 NOTE — PROGRESS NOTES
Physician Progress Note      Saranya Kenney  [facility] #:                  [id number]  :                       1955  ADMIT DATE:       2022 3:16 PM  DISCH DATE:  RESPONDING  PROVIDER #:        Blessing Salazar        QUERY TEXT:    Type of Pleural Effusion: Please provide further specificity, if known. Clinical indicators include: pleural effusion, carcinoma, malignancy, cancer  Options provided:  -- Pleural effusion caused by malignancy  -- Pleural effusion caused by congestive heart failure  -- Hydrothorax or chylous effusion  -- Hemothorax  -- Influenzal pleural effusion  -- Other - I will add my own diagnosis  -- Disagree - Not applicable / Not valid  -- Disagree - Clinically Unable to determine / Unknown        PROVIDER RESPONSE TEXT:    Provider was unable to determine a response for this query.       Electronically signed by:  Blessing Salazar 2022 12:48 PM

## 2022-05-13 VITALS
WEIGHT: 241.6 LBS | RESPIRATION RATE: 20 BRPM | HEART RATE: 83 BPM | BODY MASS INDEX: 45.61 KG/M2 | OXYGEN SATURATION: 92 % | HEIGHT: 61 IN | DIASTOLIC BLOOD PRESSURE: 62 MMHG | TEMPERATURE: 98.3 F | SYSTOLIC BLOOD PRESSURE: 133 MMHG

## 2022-05-13 LAB
ALBUMIN SERPL-MCNC: 2.4 G/DL (ref 3.5–5.2)
ALP BLD-CCNC: 96 U/L (ref 35–104)
ALT SERPL-CCNC: 34 U/L (ref 0–32)
ANION GAP SERPL CALCULATED.3IONS-SCNC: 10 MMOL/L (ref 7–16)
AST SERPL-CCNC: 12 U/L (ref 0–31)
BILIRUB SERPL-MCNC: 0.4 MG/DL (ref 0–1.2)
BILIRUBIN DIRECT: <0.2 MG/DL (ref 0–0.3)
BILIRUBIN, INDIRECT: ABNORMAL MG/DL (ref 0–1)
BUN BLDV-MCNC: 15 MG/DL (ref 6–23)
CALCIUM SERPL-MCNC: 8.6 MG/DL (ref 8.6–10.2)
CHLORIDE BLD-SCNC: 103 MMOL/L (ref 98–107)
CO2: 25 MMOL/L (ref 22–29)
CREAT SERPL-MCNC: 0.9 MG/DL (ref 0.5–1)
GFR AFRICAN AMERICAN: >60
GFR NON-AFRICAN AMERICAN: >60 ML/MIN/1.73
GLUCOSE BLD-MCNC: 152 MG/DL (ref 74–99)
HCT VFR BLD CALC: 38 % (ref 34–48)
HEMOGLOBIN: 12 G/DL (ref 11.5–15.5)
MCH RBC QN AUTO: 27.1 PG (ref 26–35)
MCHC RBC AUTO-ENTMCNC: 31.6 % (ref 32–34.5)
MCV RBC AUTO: 86 FL (ref 80–99.9)
METER GLUCOSE: 143 MG/DL (ref 74–99)
METER GLUCOSE: 247 MG/DL (ref 74–99)
PDW BLD-RTO: 15.7 FL (ref 11.5–15)
PLATELET # BLD: 368 E9/L (ref 130–450)
PMV BLD AUTO: 9.3 FL (ref 7–12)
POTASSIUM SERPL-SCNC: 3.5 MMOL/L (ref 3.5–5)
RBC # BLD: 4.42 E12/L (ref 3.5–5.5)
SODIUM BLD-SCNC: 138 MMOL/L (ref 132–146)
TOTAL PROTEIN: 5.8 G/DL (ref 6.4–8.3)
WBC # BLD: 16.6 E9/L (ref 4.5–11.5)

## 2022-05-13 PROCEDURE — 82962 GLUCOSE BLOOD TEST: CPT

## 2022-05-13 PROCEDURE — 51702 INSERT TEMP BLADDER CATH: CPT

## 2022-05-13 PROCEDURE — 85027 COMPLETE CBC AUTOMATED: CPT

## 2022-05-13 PROCEDURE — 6370000000 HC RX 637 (ALT 250 FOR IP): Performed by: STUDENT IN AN ORGANIZED HEALTH CARE EDUCATION/TRAINING PROGRAM

## 2022-05-13 PROCEDURE — 6370000000 HC RX 637 (ALT 250 FOR IP): Performed by: NURSE PRACTITIONER

## 2022-05-13 PROCEDURE — 80048 BASIC METABOLIC PNL TOTAL CA: CPT

## 2022-05-13 PROCEDURE — 2580000003 HC RX 258: Performed by: STUDENT IN AN ORGANIZED HEALTH CARE EDUCATION/TRAINING PROGRAM

## 2022-05-13 PROCEDURE — 80076 HEPATIC FUNCTION PANEL: CPT

## 2022-05-13 PROCEDURE — 6360000002 HC RX W HCPCS: Performed by: STUDENT IN AN ORGANIZED HEALTH CARE EDUCATION/TRAINING PROGRAM

## 2022-05-13 PROCEDURE — 51798 US URINE CAPACITY MEASURE: CPT

## 2022-05-13 PROCEDURE — 36415 COLL VENOUS BLD VENIPUNCTURE: CPT

## 2022-05-13 RX ORDER — BISACODYL 10 MG
10 SUPPOSITORY, RECTAL RECTAL DAILY PRN
Qty: 10 SUPPOSITORY | Refills: 0 | Status: SHIPPED | OUTPATIENT
Start: 2022-05-13 | End: 2022-06-12

## 2022-05-13 RX ORDER — LEVOFLOXACIN 750 MG/1
750 TABLET ORAL DAILY
Qty: 5 TABLET | Refills: 0 | Status: SHIPPED | OUTPATIENT
Start: 2022-05-13 | End: 2022-05-18

## 2022-05-13 RX ADMIN — LISINOPRIL 20 MG: 20 TABLET ORAL at 08:38

## 2022-05-13 RX ADMIN — Medication 1 TABLET: at 08:37

## 2022-05-13 RX ADMIN — TORSEMIDE 20 MG: 20 TABLET ORAL at 08:37

## 2022-05-13 RX ADMIN — ASPIRIN 325 MG: 325 TABLET, COATED ORAL at 08:37

## 2022-05-13 RX ADMIN — FAMOTIDINE 20 MG: 20 TABLET ORAL at 08:37

## 2022-05-13 RX ADMIN — POLYETHYLENE GLYCOL 3350 17 G: 17 POWDER, FOR SOLUTION ORAL at 08:37

## 2022-05-13 RX ADMIN — ISOSORBIDE MONONITRATE 30 MG: 30 TABLET, EXTENDED RELEASE ORAL at 08:36

## 2022-05-13 RX ADMIN — SENNOSIDES AND DOCUSATE SODIUM 2 TABLET: 50; 8.6 TABLET ORAL at 08:37

## 2022-05-13 RX ADMIN — INSULIN LISPRO 4 UNITS: 100 INJECTION, SOLUTION INTRAVENOUS; SUBCUTANEOUS at 13:27

## 2022-05-13 RX ADMIN — INSULIN LISPRO 2 UNITS: 100 INJECTION, SOLUTION INTRAVENOUS; SUBCUTANEOUS at 08:37

## 2022-05-13 RX ADMIN — ENOXAPARIN SODIUM 30 MG: 100 INJECTION SUBCUTANEOUS at 08:38

## 2022-05-13 RX ADMIN — AMLODIPINE BESYLATE 10 MG: 10 TABLET ORAL at 08:37

## 2022-05-13 RX ADMIN — Medication 10 ML: at 08:39

## 2022-05-13 RX ADMIN — METOPROLOL TARTRATE 50 MG: 50 TABLET, FILM COATED ORAL at 08:36

## 2022-05-13 NOTE — DISCHARGE SUMMARY
[facility]   Discharge summary   Patient ID:  Renzo Gant  [id number]  61 y.o.  1955    Admit date: 5/5/2022    Discharge date and time: 5/13/2022    Admission Diagnoses:   Patient Active Problem List   Diagnosis    Leukocytosis    Acute cholecystitis    HLD (hyperlipidemia)    TIM (obstructive sleep apnea)    Type 2 diabetes mellitus, without long-term current use of insulin (HCC)    Morbid obesity with BMI of 40.0-44.9, adult ([facility] [address]    Primary hypertension    Hyperthyroidism    Lactic acid acidosis    Renal mass    Hepatomegaly    Pleural effusion on right       Discharge Diagnoses: Acute cholecystitis, right renal mass in morbidly obese woman, urinary retention    Consults: Urology, general surgery    Procedures: Subtotal cholecystectomy    Hospital Course:   Patient is a 68-year-old female admitted to [facility] for     Acute cholecystitis  -WBC 16.5 >15.1- 21k, 16,000 at discharge-patient placed on Levaquin 750 daily, infiltrate on chest x-ray 5/12/2022  -5/9/2022 subtotal laparoscopic cholecystectomy with drain placement  -Continue with LORRAINE drain at discharge per general surgery  -IV Rocephin and Flagyl- discontinue    Home oxygen requirement at discharge  Multifactorial secondary to debility, morbid obesity, atelectasis/infiltrate  -Patient will be going home on home oxygen at discharge, follow-up with PCP within 1 week to repeat blood and check oxygen saturations     DM type II  -Monitor labs  -ISS glucose control  -Hypoglycemia protocol initiated     New right kidney mass-concerning for renal cell carcinoma  -MRI abdomen completed 5/6/2022 with soft tissue intensity mass lesion concerning for malignancy  -Monitor kidney function-within normal limits   urology consult appreciated-recommendation noted for follow-up in [facility] for retroperitoneoscopic partial nephrectomy given her overall poor health and multiple comorbidities- this was emphasized to patient to make appointment to follow-up in [facility] for renal mass  Valencia catheter was replaced secondary to ongoing urinary  Check with urology regarding discharging patient with Valencia in place     Hypertension  -continue home medications  -Adequately controlled     Right Pleural effusion   -monitor respiratory status   -Repeat chest x-ray today-atelectasis/-place on Levaquin 750 p.o. daily at discharge     Moderate protein calorie malnutrition         Recent Labs     05/11/22 0410 05/12/22  0425 05/13/22  0723   WBC 18.8* 15.3* 16.6*   HGB 12.8 12.4 12.0   HCT 40.3 39.7 38.0    376 368       Recent Labs     05/11/22 0410 05/12/22 0425 05/13/22  0723    142 138   K 3.8 3.3* 3.5    108* 103   CO2 21* 23 25   BUN 16 15 15   CREATININE 1.0 1.1* 0.9   CALCIUM 9.1 8.7 8.6       CT ABDOMEN PELVIS W WO CONTRAST Additional Contrast? None    Result Date: 5/5/2022  EXAMINATION: CT OF THE ABDOMEN AND PELVIS WITH AND WITHOUT CONTRAST 5/5/2022 4:58 pm TECHNIQUE: CT of the abdomen and pelvis was performed with and without the administration of intravenous contrast.  Multiplanar reformatted images are provided for review. Dose modulation, iterative reconstruction, and/or weight based adjustment of the mA/kV was utilized to reduce the radiation dose to as low as reasonably achievable. COMPARISON: None. HISTORY: ORDERING SYSTEM PROVIDED HISTORY: abdominal pain, leukocytosis and elevated lactate TECHNOLOGIST PROVIDED HISTORY: Reason for exam:->abdominal pain, leukocytosis and elevated lactate Additional Contrast?->None FINDINGS: There is excreted contrast within the urinary tract on precontrast images suggesting contrast administration for a recent examination, possibly at a different institution. This limits the evaluation for urolithiasis. There is masslike contour along the inferior pole of the right kidney with homogeneous enhancement compared to normal renal cortex.   This measures approximately 4.6 x 3.7 x 3.1 cm in size.  There is a possible small cyst in the upper pole of the left kidney. The liver, spleen, and pancreas are unremarkable. There is a fat density mass at the left adrenal gland which is difficult to accurately measure. However, this measures approximately 4.4 cm in size and may represent lipoma or myelolipoma. The right adrenal gland is unremarkable in appearance. There is cholelithiasis. There is gallbladder wall thickening and stranding of the fat adjacent to the gallbladder. There is no evidence of biliary ductal dilatation. There is no evidence of bowel obstruction, pneumoperitoneum, or ascites. There are lipomas at the hepatic flexure of the colon. There are multiple ventral hernias. There is protrusion of a small bowel loop within the mouth of a right periumbilical hernia. There is no strangulation or obstruction. There is arteriosclerosis without abdominal aortic aneurysm. There is linear atelectasis and/or scarring in the bilateral lung bases, right greater than left. There are degenerative changes within the spine. 1. Cholelithiasis with suggestion of acute cholecystitis. 2. Lobular masslike contour at the inferior pole of the right kidney is nearly isodense to normal renal cortex. This is suspicious for enhancing mass. Recommend further characterization with MRI without and with intravenous contrast. 3. Left adrenal lipoma or myelolipoma. 4. Multiple ventral hernias. NM HEPATOBILIARY    Result Date: 2022  Patient MRN: [id number] : 1955 Age:  61 years Gender: Female Order Date: 2022 9:37 AM Exam: NM HEPATOBILIARY Number of Images: 4 views Indication:   RUQ pain, cholelithiasis, possible cholecystitis RUQ pain, cholelithiasis, possible cholecystitis What reading provider will be dictating this exam?->[facility] Comparison: 2022 Findings: The patient was injected with 8.6 mCi of technetium mebrofenin.  Clearance of radiotracer from the blood pool was normal Excretion of radiotracer from the liver appears to be normal Excretion of radiotracer into the biliary tree appears to be normal Excretion of tracer into the small bowel appears to be normal. The gallbladder was was not visualized . Krzysztof Bardwell 1. Findings consistent with acute cholecystitis     XR CHEST PORTABLE    Result Date: 5/5/2022  EXAMINATION: ONE XRAY VIEW OF THE CHEST 5/5/2022 4:12 pm COMPARISON: None. HISTORY: ORDERING SYSTEM PROVIDED HISTORY: abd pain and vomiting TECHNOLOGIST PROVIDED HISTORY: Reason for exam:->abd pain and vomiting FINDINGS: The lungs are without acute focal process. There is no effusion or pneumothorax. The cardiomediastinal silhouette is without acute process. The osseous structures are without acute process. No acute process. US ABDOMEN LIMITED    Result Date: 5/5/2022  EXAMINATION: RIGHT UPPER QUADRANT ULTRASOUND 5/5/2022 4:17 pm COMPARISON: None. HISTORY: ORDERING SYSTEM PROVIDED HISTORY: RUQ pain, r/o cholecystitis TECHNOLOGIST PROVIDED HISTORY: Reason for exam:->RUQ pain, r/o cholecystitis What reading provider will be dictating this exam?->[facility] FINDINGS: LIVER:  The liver is enlarged, measuring 19.5 cm in length. Its echotexture is diffusely increased, consistent with fatty infiltration. No focal mass is seen within it. BILIARY SYSTEM:  The gallbladder is hydropic, measuring 11.7 cm in length by 5.0 cm in width. It contains intraluminal calculi, as well as a small amount of sludge. There is thickening of the gallbladder wall to 0.5 cm. The ultrasound technologist reports a negative Olguin sign. Regardless, the presence of cholecystitis cannot be excluded. Clinical correlation is recommended. Radionuclide medicine hepatobiliary imaging can be performed considered. There is minimal dilatation of the common bile duct to 0.7 cm. Choledocholithiasis cannot be excluded. MRCP can be considered for further evaluation.  RIGHT KIDNEY: The right kidney measures 10.8 cm in length by 4.2 cm in AP diameter by 4.8 cm in width. Its echotexture is normal and there is no evidence of hydronephrosis. An exophytic lower pole solid-appearing mass with tiny calcification is noted, measuring 4.3 x 3.8 by 4.1 cm in size. Rule out renal cell carcinoma. CT scan of the abdomen and pelvis with and without intravenous contrast is recommended. No perinephric fluid is identified. PANCREAS:  Visualized portions of the pancreas are unremarkable. OTHER: No evidence of right upper quadrant ascites. A right pleural effusion is noted. 1.  Hepatomegaly with fatty infiltration. 2.  Hydropic gallbladder with cholelithiasis and sludge. Gallbladder wall thickening is also seen. Rule out cholecystitis. Clinical correlation is recommended. Radionuclide medicine hepatobiliary imaging can be considered for further evaluation. 3.  Minimal dilatation of the CBD to 0.7 cm. Rule out choledocholithiasis. MRCP can be considered for further evaluation. 4.  Solid right renal mass. Rule out renal cell carcinoma. CT scan of the abdomen and pelvis with and without intravenous contrast is recommended. 5.  Right pleural effusion. MRI ABDOMEN WO CONTRAST    Result Date: 5/7/2022  EXAMINATION: MRI OF THE ABDOMEN WITHOUT CONTRAST, 5/6/2022 7:12 pm TECHNIQUE: Multiplanar multisequence MRI of the abdomen was performed without the administration of intravenous contrast. COMPARISON: CT abdomen pelvis dated 05/05/2022 HISTORY: ORDERING SYSTEM PROVIDED HISTORY: Evaluate renal mass TECHNOLOGIST PROVIDED HISTORY: Reason for exam:->Evaluate renal mass What is the sedation requirement?->None What reading provider will be dictating this exam?->[facility] FINDINGS: Lung bases are clear Gallbladder has large filling defect of cholelithiasis in the neck of the gallbladder with a distended appearance. No intrahepatic or extrahepatic biliary dilatation.   No pancreas divisum anatomy with normal branching configuration of the biliary tree and pancreatic duct Pancreas and spleen unremarkable Kidneys redemonstrate a soft tissue apparent intensity mass lesion extending off the inferior pole measuring 4 x 3.4 by 3 cm remaining concerning for malignancy although limited due to lack of intravenous contrast on the current exam. Visualized GI tract unremarkable. No aggressive bone marrow signal findings or acute peritoneal process. No ascites no soft tissue body wall findings     Kidneys redemonstrate a soft tissue apparent intensity mass lesion extending off the inferior pole measuring 4 x 3.4 by 3 cm remaining concerning for malignancy although limited due to lack of intravenous contrast on the current exam.  Contrast enhanced examination would be recommended for definitive evaluation and/or tissue sampling. Cholelithiasis       Discharge Exam:    HEENT: NCAT,  PERRLA, No JVD  Heart:  RRR, no murmurs, gallops, or rubs.   Lungs:  CTA bilaterally, no wheeze, rales or rhonchi  Abd: bowel sounds present, nontender, nondistended, no masses-drain in place at discharge  Extrem:  No clubbing, cyanosis, or edema    Disposition: home     Patient Condition at Discharge: Stable    Patient Instructions:      Medication List      START taking these medications    bisacodyl 10 MG suppository  Commonly known as: DULCOLAX  Place 1 suppository rectally daily as needed for Constipation     levoFLOXacin 750 MG tablet  Commonly known as: LEVAQUIN  Take 1 tablet by mouth daily for 5 days        CONTINUE taking these medications    amLODIPine 10 MG tablet  Commonly known as: NORVASC     aspirin 325 MG EC tablet     aspirin-acetaminophen-caffeine 250-250-65 MG per tablet  Commonly known as: EXCEDRIN MIGRAINE     atorvastatin 20 MG tablet  Commonly known as: LIPITOR     cephALEXin 500 MG capsule  Commonly known as: KEFLEX     FISH OIL CONCENTRATE PO     HAIR SKIN & NAILS GUMMIES PO     isosorbide mononitrate 30 MG extended release tablet  Commonly known as: IMDUR     lidocaine 5 % ointment  Commonly known as: XYLOCAINE     lisinopril 20 MG tablet  Commonly known as: PRINIVIL;ZESTRIL     MEGARED ADVANCED 4 IN 1 PO     metFORMIN 500 MG tablet  Commonly known as: GLUCOPHAGE     metoprolol tartrate 50 MG tablet  Commonly known as: LOPRESSOR     NONFORMULARY     OZEMPIC (2 MG/DOSE) SC     STEGLATRO PO     therapeutic multivitamin-minerals tablet     torsemide 20 MG tablet  Commonly known as: DEMADEX     VIACTIV PO           Where to Get Your Medications      These medications were sent to [facility] [address] [address] [address]    Phone: [phone]   · bisacodyl 10 MG suppository  · levoFLOXacin 750 MG tablet       Activity: activity as tolerated  Diet: Diabetic diet  Pt has been advised to: Follow-up with Carol Ann Winston DO in 1 week.   Follow-up with consultants as recommended by them    Note that over 30 minutes was spent in preparing discharge papers, discussing discharge with patient, medication review, etc.    Signed:  Scott Zazueta MD  5/13/2022  9:47 AM

## 2022-05-13 NOTE — PROGRESS NOTES
Discharge instructions given and explained to patient, demonstrated how to empty the drain and catheter and compliance with follow up appointments.  Pt in understanding

## 2022-05-13 NOTE — CARE COORDINATION
Patient's discharge was held yesterday due to no urine output after mar removed and no BM; mar reinserted and urology to be consulted. Chest XR yesterday showed new right lower lobe atelectasis/infiltrate. Home oxygen delivered to patient yesterday. Plan remains home and family to transport.     Nirmala Giordano, MSW, LSW [phone]

## 2022-05-14 ENCOUNTER — HOSPITAL ENCOUNTER (EMERGENCY)
Age: 67
Discharge: HOME | End: 2022-05-14
Attending: EMERGENCY MEDICINE
Payer: MEDICARE

## 2022-05-14 VITALS
RESPIRATION RATE: 16 BRPM | OXYGEN SATURATION: 92 % | HEART RATE: 95 BPM | SYSTOLIC BLOOD PRESSURE: 112 MMHG | TEMPERATURE: 98.6 F | DIASTOLIC BLOOD PRESSURE: 54 MMHG

## 2022-05-14 DIAGNOSIS — Z90.49 HISTORY OF CHOLECYSTECTOMY: ICD-10-CM

## 2022-05-14 DIAGNOSIS — N28.89 RIGHT KIDNEY MASS: ICD-10-CM

## 2022-05-14 DIAGNOSIS — T83.9XXA FOLEY CATHETER PROBLEM, INITIAL ENCOUNTER (HCC): Primary | ICD-10-CM

## 2022-05-14 LAB
BACTERIA: ABNORMAL /HPF
BILIRUBIN URINE: NEGATIVE
BLOOD, URINE: ABNORMAL
CLARITY: CLEAR
COLOR: YELLOW
EPITHELIAL CELLS, UA: ABNORMAL /HPF
GLUCOSE URINE: >=1000 MG/DL
KETONES, URINE: NEGATIVE MG/DL
LEUKOCYTE ESTERASE, URINE: NEGATIVE
NITRITE, URINE: NEGATIVE
PH UA: 5.5 (ref 5–9)
PROTEIN UA: NEGATIVE MG/DL
RBC UA: >20 /HPF (ref 0–2)
SPECIFIC GRAVITY UA: 1.01 (ref 1–1.03)
UROBILINOGEN, URINE: 0.2 E.U./DL
WBC UA: ABNORMAL /HPF (ref 0–5)

## 2022-05-14 PROCEDURE — 87088 URINE BACTERIA CULTURE: CPT

## 2022-05-14 PROCEDURE — 99283 EMERGENCY DEPT VISIT LOW MDM: CPT

## 2022-05-14 PROCEDURE — 81001 URINALYSIS AUTO W/SCOPE: CPT

## 2022-05-14 ASSESSMENT — ENCOUNTER SYMPTOMS
BACK PAIN: 0
NAUSEA: 0
VOMITING: 0
ABDOMINAL PAIN: 0
SINUS PRESSURE: 0
SHORTNESS OF BREATH: 0
COUGH: 0
RHINORRHEA: 0
PHOTOPHOBIA: 0

## 2022-05-14 ASSESSMENT — PAIN DESCRIPTION - DESCRIPTORS: DESCRIPTORS: DISCOMFORT;BURNING

## 2022-05-14 ASSESSMENT — PAIN DESCRIPTION - FREQUENCY: FREQUENCY: CONTINUOUS

## 2022-05-14 ASSESSMENT — PAIN DESCRIPTION - PAIN TYPE: TYPE: ACUTE PAIN

## 2022-05-14 ASSESSMENT — PAIN - FUNCTIONAL ASSESSMENT: PAIN_FUNCTIONAL_ASSESSMENT: 0-10

## 2022-05-14 ASSESSMENT — PAIN DESCRIPTION - LOCATION: LOCATION: VAGINA

## 2022-05-14 NOTE — ED NOTES
Specimen taken from port on catheter before the bag using sterile technique       Sandip Hein RN  05/14/22 3281

## 2022-05-14 NOTE — ED PROVIDER NOTES
Bao Burnett is a 61 y.o. female presenting to the ED for hematuria, beginning pta ago. The complaint has been intermittent, moderate in severity, and worsened by nothing. 61 yo f who recently was seen in [address] for acute cholecystitis, was discharged w a mar catheter. She states that her Mar catheter bag is leaking and wants to change to a new one. There was just a small blood clot in the urine that was less than pea-sized. Her urine otherwise was crystal clear. She denies any fever or chills she denies any signs or symptoms of urinary outlet obstruction. Also was found recently to have a renal mass. She is to follow-up with urology her family doctor and general surgery. Review of Systems:   Review of Systems   Constitutional: Negative for chills, fatigue and fever. HENT: Negative for congestion, rhinorrhea and sinus pressure. Eyes: Negative for photophobia and visual disturbance. Respiratory: Negative for cough and shortness of breath. Cardiovascular: Negative for chest pain, palpitations and leg swelling. Gastrointestinal: Negative for abdominal pain, nausea and vomiting. Endocrine: Negative for polydipsia and polyuria. Genitourinary: Positive for hematuria. Musculoskeletal: Negative for back pain and neck pain. Skin: Negative for pallor and rash. Allergic/Immunologic: Negative for food allergies and immunocompromised state. Neurological: Negative for dizziness, seizures and numbness. Hematological: Negative for adenopathy. Does not bruise/bleed easily. Psychiatric/Behavioral: Negative for agitation.  The patient is not nervous/anxious.                  --------------------------------------------- PAST HISTORY ---------------------------------------------  Past Medical History:  has a past medical history of Allergic rhinitis, Cerebrovascular disease, Cerebrovascular disease, Diabetes ([facility] [address] Hyperlipidemia, Hypertension, Irregular heart beat, Myocardial infarct ([facility] [address] Neuropathy, Obesity, Sleep apnea, Thyroid disease, and Weakness due to cerebrovascular accident (CVA). Past Surgical History:  has a past surgical history that includes Hysterectomy (2001); Colonoscopy (2000); Colonoscopy (2016); and Cholecystectomy, laparoscopic (N/A, 5/9/2022). Social History:  reports that she has never smoked. She has never used smokeless tobacco. She reports that she does not drink alcohol and does not use drugs. Family History: family history includes Cancer in her maternal grandmother; Clotting Disorder in her brother; Diabetes in her father, maternal aunt, mother, paternal grandmother, and sister; Heart Attack in her brother; Heart Disease in her brother, brother, father, paternal grandfather, and sister; High Blood Pressure in her brother, father, mother, and sister; High Cholesterol in her mother; Kidney Disease in her maternal aunt. The patients home medications have been reviewed.     Allergies: Latex, Doxycycline, Seasonal, and Adhesive tape    -------------------------------------------------- RESULTS -------------------------------------------------  All laboratory and radiology results have been personally reviewed by myself   LABS:  Results for orders placed or performed during the hospital encounter of 05/14/22   Urinalysis with Microscopic   Result Value Ref Range    Color, UA Yellow Straw/Yellow    Clarity, UA Clear Clear    Glucose, Ur >=1000 (A) Negative mg/dL    Bilirubin Urine Negative Negative    Ketones, Urine Negative Negative mg/dL    Specific Gravity, UA 1.010 1.005 - 1.030    Blood, Urine LARGE (A) Negative    pH, UA 5.5 5.0 - 9.0    Protein, UA Negative Negative mg/dL    Urobilinogen, Urine 0.2 <2.0 E.U./dL    Nitrite, Urine Negative Negative    Leukocyte Esterase, Urine Negative Negative    WBC, UA NONE 0 - 5 /HPF    RBC, UA >20 0 - 2 /HPF    Epithelial Cells, UA RARE /HPF    Bacteria, UA RARE (A) None Seen /HPF RADIOLOGY:  Interpreted by Radiologist.  No orders to display       ------------------------- NURSING NOTES AND VITALS REVIEWED ---------------------------   The nursing notes within the ED encounter and vital signs as below have been reviewed. BP (!) 112/54   Pulse 95   Temp 98.6 °F (37 °C) (Oral)   Resp 16   SpO2 92%   Oxygen Saturation Interpretation: Normal      ---------------------------------------------------PHYSICAL EXAM--------------------------------------    Physical Exam  Vitals reviewed. Constitutional:       General: She is not in acute distress. Appearance: Normal appearance. She is not toxic-appearing. HENT:      Head: Normocephalic and atraumatic. Right Ear: External ear normal.      Left Ear: External ear normal.      Nose: Nose normal. No congestion. Mouth/Throat:      Mouth: Mucous membranes are moist.      Pharynx: Oropharynx is clear. No posterior oropharyngeal erythema. Eyes:      Extraocular Movements: Extraocular movements intact. Pupils: Pupils are equal, round, and reactive to light. Cardiovascular:      Rate and Rhythm: Normal rate and regular rhythm. Pulses: Normal pulses. Heart sounds: No murmur heard. Pulmonary:      Effort: Pulmonary effort is normal.      Breath sounds: No wheezing or rhonchi. Chest:      Chest wall: No tenderness. Abdominal:      General: Bowel sounds are normal.      Tenderness: There is no abdominal tenderness. There is no right CVA tenderness, left CVA tenderness or guarding. Genitourinary:     Comments: Valencia catheter shows no signs of obstruction. There is no gross hematuria. Urine is clear. Musculoskeletal:         General: No swelling or deformity. Cervical back: Normal range of motion and neck supple. No muscular tenderness. Skin:     General: Skin is warm and dry. Capillary Refill: Capillary refill takes less than 2 seconds.       Comments: Abdominal incision sites clear dry and intact. LORRAINE drain shows serosanguineous fluid. Neurological:      General: No focal deficit present. Mental Status: She is alert and oriented to person, place, and time. Psychiatric:         Mood and Affect: Mood normal.               ------------------------------ ED COURSE/MEDICAL DECISION MAKING----------------------  Medications - No data to display    ED COURSE:       Medical Decision Making: We set up was changed. No evidence of UTI. During patient's hospital stay she was found to have a right renal mass. I instructed her importance of following up with urology for renal mass and Valencia catheter. She stated she would call Monday. Patient instructed to follow-up with PCP urology and general surgery. She is to call Monday for appointment. We discussed warning signs symptoms or when to return       Counseled regarding todays diagnosis, including possible risks and complications,  especially if left uncontrolled. Advised patient to call her primary care physician with any new medication issues, and read all Rx info from pharmacy to assure aware of all possible risks and side effects of medication before taking. I did discuss warning signs for when to return to the Emergency Room, and the patient verbalized understanding      Counseling: The emergency provider has spoken with the patient and discussed todays results, in addition to providing specific details for the plan of care and counseling regarding the diagnosis and prognosis. Questions are answered at this time and they are agreeable with the plan.      --------------------------------- IMPRESSION AND DISPOSITION ---------------------------------    IMPRESSION  1. Valencia catheter problem, initial encounter ([facility] [address]    2. History of cholecystectomy    3. Right kidney mass        DISPOSITION  Disposition: Discharge to home  Patient condition is good      NOTE: This report was transcribed using voice recognition software.  Every effort was made to ensure accuracy; however, inadvertent computerized transcription errors may be present       Johny Vann DO  05/14/22 1902

## 2022-05-16 ENCOUNTER — HOSPITAL ENCOUNTER (EMERGENCY)
Age: 67
Discharge: HOME | End: 2022-05-16
Attending: EMERGENCY MEDICINE
Payer: MEDICARE

## 2022-05-16 ENCOUNTER — APPOINTMENT (OUTPATIENT)
Dept: CT IMAGING | Age: 67
End: 2022-05-16
Payer: MEDICARE

## 2022-05-16 VITALS
HEART RATE: 93 BPM | OXYGEN SATURATION: 94 % | TEMPERATURE: 98.2 F | BODY MASS INDEX: 45.88 KG/M2 | SYSTOLIC BLOOD PRESSURE: 116 MMHG | WEIGHT: 243 LBS | RESPIRATION RATE: 16 BRPM | DIASTOLIC BLOOD PRESSURE: 74 MMHG | HEIGHT: 61 IN

## 2022-05-16 DIAGNOSIS — Z51.89 VISIT FOR WOUND CHECK: Primary | ICD-10-CM

## 2022-05-16 LAB
ALBUMIN SERPL-MCNC: 3 G/DL (ref 3.5–5.2)
ALP BLD-CCNC: 80 U/L (ref 35–104)
ALT SERPL-CCNC: 19 U/L (ref 0–32)
ANION GAP SERPL CALCULATED.3IONS-SCNC: 12 MMOL/L (ref 7–16)
ANISOCYTOSIS: ABNORMAL
AST SERPL-CCNC: 14 U/L (ref 0–31)
BASOPHILS ABSOLUTE: 0.21 E9/L (ref 0–0.2)
BASOPHILS RELATIVE PERCENT: 0.9 % (ref 0–2)
BILIRUB SERPL-MCNC: 0.3 MG/DL (ref 0–1.2)
BUN BLDV-MCNC: 15 MG/DL (ref 6–23)
BURR CELLS: ABNORMAL
CALCIUM SERPL-MCNC: 8.9 MG/DL (ref 8.6–10.2)
CHLORIDE BLD-SCNC: 101 MMOL/L (ref 98–107)
CO2: 25 MMOL/L (ref 22–29)
CREAT SERPL-MCNC: 0.8 MG/DL (ref 0.5–1)
EOSINOPHILS ABSOLUTE: 1.79 E9/L (ref 0.05–0.5)
EOSINOPHILS RELATIVE PERCENT: 7.8 % (ref 0–6)
GFR AFRICAN AMERICAN: >60
GFR NON-AFRICAN AMERICAN: >60 ML/MIN/1.73
GLUCOSE BLD-MCNC: 154 MG/DL (ref 74–99)
HCT VFR BLD CALC: 37.2 % (ref 34–48)
HEMOGLOBIN: 11.6 G/DL (ref 11.5–15.5)
LYMPHOCYTES ABSOLUTE: 1.84 E9/L (ref 1.5–4)
LYMPHOCYTES RELATIVE PERCENT: 7.8 % (ref 20–42)
MCH RBC QN AUTO: 27.3 PG (ref 26–35)
MCHC RBC AUTO-ENTMCNC: 31.2 % (ref 32–34.5)
MCV RBC AUTO: 87.5 FL (ref 80–99.9)
METAMYELOCYTES RELATIVE PERCENT: 0.9 % (ref 0–1)
MONOCYTES ABSOLUTE: 3.91 E9/L (ref 0.1–0.95)
MONOCYTES RELATIVE PERCENT: 16.5 % (ref 2–12)
NEUTROPHILS ABSOLUTE: 15.41 E9/L (ref 1.8–7.3)
NEUTROPHILS RELATIVE PERCENT: 66.1 % (ref 43–80)
OVALOCYTES: ABNORMAL
PDW BLD-RTO: 15.7 FL (ref 11.5–15)
PLATELET # BLD: 497 E9/L (ref 130–450)
PMV BLD AUTO: 9.2 FL (ref 7–12)
POIKILOCYTES: ABNORMAL
POLYCHROMASIA: ABNORMAL
POTASSIUM REFLEX MAGNESIUM: 4 MMOL/L (ref 3.5–5)
RBC # BLD: 4.25 E12/L (ref 3.5–5.5)
SODIUM BLD-SCNC: 138 MMOL/L (ref 132–146)
TOTAL PROTEIN: 6.7 G/DL (ref 6.4–8.3)
WBC # BLD: 23 E9/L (ref 4.5–11.5)

## 2022-05-16 PROCEDURE — 2580000003 HC RX 258: Performed by: EMERGENCY MEDICINE

## 2022-05-16 PROCEDURE — 80053 COMPREHEN METABOLIC PANEL: CPT

## 2022-05-16 PROCEDURE — 85025 COMPLETE CBC W/AUTO DIFF WBC: CPT

## 2022-05-16 PROCEDURE — 74177 CT ABD & PELVIS W/CONTRAST: CPT

## 2022-05-16 PROCEDURE — 96374 THER/PROPH/DIAG INJ IV PUSH: CPT

## 2022-05-16 PROCEDURE — 6360000004 HC RX CONTRAST MEDICATION: Performed by: RADIOLOGY

## 2022-05-16 PROCEDURE — 6360000002 HC RX W HCPCS: Performed by: EMERGENCY MEDICINE

## 2022-05-16 PROCEDURE — 99285 EMERGENCY DEPT VISIT HI MDM: CPT

## 2022-05-16 RX ORDER — FENTANYL CITRATE 0.05 MG/ML
50 INJECTION, SOLUTION INTRAMUSCULAR; INTRAVENOUS ONCE
Status: COMPLETED | OUTPATIENT
Start: 2022-05-16 | End: 2022-05-16

## 2022-05-16 RX ORDER — 0.9 % SODIUM CHLORIDE 0.9 %
1000 INTRAVENOUS SOLUTION INTRAVENOUS ONCE
Status: COMPLETED | OUTPATIENT
Start: 2022-05-16 | End: 2022-05-16

## 2022-05-16 RX ADMIN — SODIUM CHLORIDE 1000 ML: 9 INJECTION, SOLUTION INTRAVENOUS at 18:20

## 2022-05-16 RX ADMIN — IOPAMIDOL 75 ML: 755 INJECTION, SOLUTION INTRAVENOUS at 19:27

## 2022-05-16 RX ADMIN — FENTANYL CITRATE 50 MCG: 50 INJECTION INTRAMUSCULAR; INTRAVENOUS at 18:20

## 2022-05-16 ASSESSMENT — ENCOUNTER SYMPTOMS
NAUSEA: 0
COUGH: 0
DIARRHEA: 0
ABDOMINAL DISTENTION: 0
COLOR CHANGE: 0
BLOOD IN STOOL: 0
VOMITING: 0
ABDOMINAL PAIN: 1
RHINORRHEA: 0
SHORTNESS OF BREATH: 0
BACK PAIN: 0

## 2022-05-16 ASSESSMENT — PAIN SCALES - GENERAL
PAINLEVEL_OUTOF10: 6
PAINLEVEL_OUTOF10: 4

## 2022-05-16 NOTE — ED PROVIDER NOTES
Patient presents to the ED for evaluation. Patient had her gallbladder removed on the 11th of this month. She states this morning she noticed that there was drainage on a pillow that she was leaning against.  She has a current drain in. She states she does have abdominal pain which has been persistent since the surgery. May beats a little worse but she not sure. Denies fever or chills. No associated nausea, vomiting, or diarrhea. No blood in the stool. No blood in the urine. She was concerned about the drainage therefore she came in to be evaluated. Review of Systems   Constitutional: Negative for chills, diaphoresis, fatigue and fever. HENT: Negative for congestion and rhinorrhea. Eyes: Negative for visual disturbance. Respiratory: Negative for cough and shortness of breath. Cardiovascular: Negative for chest pain and palpitations. Gastrointestinal: Positive for abdominal pain. Negative for abdominal distention, blood in stool, diarrhea, nausea and vomiting. Genitourinary: Negative for difficulty urinating, hematuria and urgency. Musculoskeletal: Negative for back pain and myalgias. Skin: Positive for wound. Negative for color change. Neurological: Negative for dizziness, syncope and light-headedness. Hematological: Does not bruise/bleed easily. All other systems reviewed and are negative. Physical Exam  Vitals and nursing note reviewed. Constitutional:       General: She is not in acute distress. Appearance: She is well-developed. She is obese. She is not diaphoretic. HENT:      Head: Normocephalic and atraumatic. Eyes:      General: No scleral icterus. Conjunctiva/sclera: Conjunctivae normal.   Cardiovascular:      Rate and Rhythm: Normal rate and regular rhythm. Heart sounds: Normal heart sounds. No murmur heard. Pulmonary:      Effort: Pulmonary effort is normal. No respiratory distress. Breath sounds: Normal breath sounds.  No wheezing or rales. Abdominal:      General: Bowel sounds are normal.      Palpations: Abdomen is soft. Tenderness: There is abdominal tenderness ( Mild tenderness around the right upper quadrant. No abdominal distention. ). There is no guarding or rebound. Comments: Patient has a LORRAINE drain in the right upper quadrant. There is a small amount of bilious drainage around. No surrounding erythema. No purulent discharge noted. LORRAINE drain is intact. Musculoskeletal:      Cervical back: Normal range of motion and neck supple. Skin:     General: Skin is warm and dry. Coloration: Skin is not jaundiced or pale. Neurological:      Mental Status: She is alert and oriented to person, place, and time. Procedures     MDM   Patient presents to the ED for evaluation. Patient states she was having draining around her surgical drain site. Patient had recent cholecystitis and has a LORRAINE drain present. Labs and imaging were assessed. CBC did show white count of 23,000. I did further assess this with a CT of abdomen pelvis. Patient also admits that she has been having elevated white counts and her PCP has referred her to a specialist for further evaluation. CMP was also obtained which showed no electrolyte abnormalities. No elevated liver enzymes. CT abdomen pelvis showed findings consistent with status postcholecystectomy. No other acute findings. She does have a right paraumbilical hernia no evidence of obstruction or strangulation at the hernia site I did consult with the patient's surgeon who was comfortable the patient being discharged home and seeing her in the outpatient setting. Patient was comfortable being discharged home and will call the office in the morning. ED Course as of 05/16/22 2142   Mon May 16, 2022   2106 Resting in bed no distress. Discussed results of labs and imaging with her.   She states she is aware of the elevated white count and that her PCP has referred her to a another physician to have this further evaluated. Discussed that she does have an elevated white count. Will discuss findings with her surgeon as well. [MS]   2137 Spoke with Dr. Portillo Rangel (Surgery). Discussed case. He states that the patient is okay to discharge home and will see this patient in follow-up   [MS]      ED Course User Index  [MS] Matthew Cassidy, DO       --------------------------------------------- PAST HISTORY ---------------------------------------------  Past Medical History:  has a past medical history of Allergic rhinitis, Cerebrovascular disease, Cerebrovascular disease, Diabetes ([facility] [address] Hyperlipidemia, Hypertension, Irregular heart beat, Myocardial infarct ([facility] [address] Neuropathy, Obesity, Sleep apnea, Thyroid disease, and Weakness due to cerebrovascular accident (CVA). Past Surgical History:  has a past surgical history that includes Hysterectomy (2001); Colonoscopy (2000); Colonoscopy (2016); and Cholecystectomy, laparoscopic (N/A, 5/9/2022). Social History:  reports that she has never smoked. She has never used smokeless tobacco. She reports that she does not drink alcohol and does not use drugs. Family History: family history includes Cancer in her maternal grandmother; Clotting Disorder in her brother; Diabetes in her father, maternal aunt, mother, paternal grandmother, and sister; Heart Attack in her brother; Heart Disease in her brother, brother, father, paternal grandfather, and sister; High Blood Pressure in her brother, father, mother, and sister; High Cholesterol in her mother; Kidney Disease in her maternal aunt. The patients home medications have been reviewed.     Allergies: Latex, Doxycycline, Seasonal, and Adhesive tape    -------------------------------------------------- RESULTS -------------------------------------------------  Labs:  Results for orders placed or performed during the hospital encounter of 05/16/22   CBC with Auto Differential   Result Value Ref Range WBC 23.0 (H) 4.5 - 11.5 E9/L    RBC 4.25 3.50 - 5.50 E12/L    Hemoglobin 11.6 11.5 - 15.5 g/dL    Hematocrit 37.2 34.0 - 48.0 %    MCV 87.5 80.0 - 99.9 fL    MCH 27.3 26.0 - 35.0 pg    MCHC 31.2 (L) 32.0 - 34.5 %    RDW 15.7 (H) 11.5 - 15.0 fL    Platelets 013 (H) 024 - 450 E9/L    MPV 9.2 7.0 - 12.0 fL    Neutrophils % 66.1 43.0 - 80.0 %    Lymphocytes % 7.8 (L) 20.0 - 42.0 %    Monocytes % 16.5 (H) 2.0 - 12.0 %    Eosinophils % 7.8 (H) 0.0 - 6.0 %    Basophils % 0.9 0.0 - 2.0 %    Neutrophils Absolute 15.41 (H) 1.80 - 7.30 E9/L    Lymphocytes Absolute 1.84 1.50 - 4.00 E9/L    Monocytes Absolute 3.91 (H) 0.10 - 0.95 E9/L    Eosinophils Absolute 1.79 (H) 0.05 - 0.50 E9/L    Basophils Absolute 0.21 (H) 0.00 - 0.20 E9/L    Metamyelocytes Relative 0.9 0.0 - 1.0 %    Anisocytosis 1+     Polychromasia 1+     Poikilocytes 1+     Boles Cells 1+     Ovalocytes 1+    Comprehensive Metabolic Panel w/ Reflex to MG   Result Value Ref Range    Sodium 138 132 - 146 mmol/L    Potassium reflex Magnesium 4.0 3.5 - 5.0 mmol/L    Chloride 101 98 - 107 mmol/L    CO2 25 22 - 29 mmol/L    Anion Gap 12 7 - 16 mmol/L    Glucose 154 (H) 74 - 99 mg/dL    BUN 15 6 - 23 mg/dL    CREATININE 0.8 0.5 - 1.0 mg/dL    GFR Non-African American >60 >=60 mL/min/1.73    GFR African American >60     Calcium 8.9 8.6 - 10.2 mg/dL    Total Protein 6.7 6.4 - 8.3 g/dL    Albumin 3.0 (L) 3.5 - 5.2 g/dL    Total Bilirubin 0.3 0.0 - 1.2 mg/dL    Alkaline Phosphatase 80 35 - 104 U/L    ALT 19 0 - 32 U/L    AST 14 0 - 31 U/L       Radiology:  CT ABDOMEN PELVIS W IV CONTRAST Additional Contrast? None   Final Result   1. Status post cholecystectomy with drain catheter along inferior margin of   the liver. 2. Fat stranding in right upper quadrant and gallbladder fossa likely   secondary to recent surgery. No obvious loculated fluid collections to   suggest abscess. Short-term follow-up may be helpful for further evaluation.    3. Right paraumbilical hernia contains a segment of bowel. No evidence of   obstruction or strangulation at site of the hernia. 4. No free air or significant free fluid collections. 5. Redemonstration of isoattenuating mass along inferior margin of the right   kidney. This finding raises concern for renal cell carcinoma. Contrast   enhanced MRI recommended for further evaluation.             ------------------------- NURSING NOTES AND VITALS REVIEWED ---------------------------  Date / Time Roomed:  5/16/2022  5:46 PM  ED Bed Assignment:  10/10    The nursing notes within the ED encounter and vital signs as below have been reviewed. /74   Pulse 93   Temp 98.2 °F (36.8 °C) (Oral)   Resp 16   Ht 5' 1\" (1.549 m)   Wt 243 lb (110.2 kg)   SpO2 94%   BMI 45.91 kg/m²   Oxygen Saturation Interpretation: Normal      ------------------------------------------ PROGRESS NOTES ------------------------------------------  I have spoken with the patient and discussed todays results, in addition to providing specific details for the plan of care and counseling regarding the diagnosis and prognosis. Their questions are answered at this time and they are agreeable with the plan. I discussed at length with them reasons for immediate return here for re evaluation. They will followup with primary care by calling their office tomorrow. --------------------------------- ADDITIONAL PROVIDER NOTES ---------------------------------  At this time the patient is without objective evidence of an acute process requiring hospitalization or inpatient management. They have remained hemodynamically stable throughout their entire ED visit and are stable for discharge with outpatient follow-up. The plan has been discussed in detail and they are aware of the specific conditions for emergent return, as well as the importance of follow-up. New Prescriptions    No medications on file       Diagnosis:  1.  Visit for wound check Disposition:  Patient's disposition: Discharge to home  Patient's condition is stable.             Karoline Dykes DO  05/16/22 3245

## 2022-05-17 LAB — URINE CULTURE, ROUTINE: NORMAL

## 2022-05-17 NOTE — PROGRESS NOTES
Physician Progress Note      PATIENT:               Katie Gonzales  CSN #:                  [id number]  :                       1955  ADMIT DATE:       2022 3:16 PM  [address] [place] DATE:        2022 6:00 PM  RESPONDING  PROVIDER #:        JOSE Drummond MD          QUERY TEXT:    Patient admitted with Acute Cholecystitis and  noted documentation of Moderate   Malnutrition documented on the Discharge Summary. However; dietary notes   only \"at risk for Malnutrition\" per consult note dated 22. The medical record reflects the following:  Risk Factors: Acute Cholecystitis, Morbidly Obese  Clinical Indicators: Dietary consult notes only \"at risk for\", no significant   body fat loss, no muscle mass loss, no fluid accumulation  Treatment: Continued current diet, started ENSURE HP BID, Dietary consult    Thank you in advance,    Gilberto Reno RN-BSN, [facility]  Clinical   [facility] vijaya, Lucia Malik Marvin [email or web address]  Options provided:  -- Moderate Malnutrition present as evidenced by, Please document evidence. -- Moderate Malnutrition was ruled out  -- Other - I will add my own diagnosis  -- Disagree - Not applicable / Not valid  -- Disagree - Clinically unable to determine / Unknown  -- Refer to Clinical Documentation Reviewer    PROVIDER RESPONSE TEXT:    Moderate Malnutrition was ruled out after study.     Query created by: Leonarda Pepe on 2022 1:53 PM      Electronically signed by:  JOSE Drummond MD 2022 9:28 AM

## 2022-08-16 ENCOUNTER — HOSPITAL ENCOUNTER (OUTPATIENT)
Age: 67
Discharge: HOME | End: 2022-08-18

## 2022-08-16 PROCEDURE — 88305 TISSUE EXAM BY PATHOLOGIST: CPT

## 2023-02-06 ENCOUNTER — HOSPITAL ENCOUNTER (EMERGENCY)
Age: 68
Discharge: HOME | End: 2023-02-06
Attending: EMERGENCY MEDICINE
Payer: MEDICARE

## 2023-02-06 ENCOUNTER — APPOINTMENT (OUTPATIENT)
Dept: GENERAL RADIOLOGY | Age: 68
End: 2023-02-06
Payer: MEDICARE

## 2023-02-06 VITALS
RESPIRATION RATE: 18 BRPM | SYSTOLIC BLOOD PRESSURE: 127 MMHG | BODY MASS INDEX: 49.73 KG/M2 | TEMPERATURE: 97.8 F | DIASTOLIC BLOOD PRESSURE: 56 MMHG | HEART RATE: 71 BPM | WEIGHT: 263.19 LBS | OXYGEN SATURATION: 98 %

## 2023-02-06 DIAGNOSIS — R07.9 CHEST PAIN, UNSPECIFIED TYPE: Primary | ICD-10-CM

## 2023-02-06 LAB
ALBUMIN SERPL-MCNC: 3.9 G/DL (ref 3.5–5.2)
ALP BLD-CCNC: 52 U/L (ref 35–104)
ALT SERPL-CCNC: 28 U/L (ref 0–32)
ANION GAP SERPL CALCULATED.3IONS-SCNC: 13 MMOL/L (ref 7–16)
AST SERPL-CCNC: 21 U/L (ref 0–31)
BASOPHILS ABSOLUTE: 0.04 E9/L (ref 0–0.2)
BASOPHILS RELATIVE PERCENT: 0.4 % (ref 0–2)
BILIRUB SERPL-MCNC: 0.4 MG/DL (ref 0–1.2)
BUN BLDV-MCNC: 22 MG/DL (ref 6–23)
CALCIUM SERPL-MCNC: 8.9 MG/DL (ref 8.6–10.2)
CHLORIDE BLD-SCNC: 101 MMOL/L (ref 98–107)
CO2: 23 MMOL/L (ref 22–29)
CREAT SERPL-MCNC: 1.1 MG/DL (ref 0.5–1)
EOSINOPHILS ABSOLUTE: 0.33 E9/L (ref 0.05–0.5)
EOSINOPHILS RELATIVE PERCENT: 2.9 % (ref 0–6)
GFR SERPL CREATININE-BSD FRML MDRD: 56 ML/MIN/1.73
GLUCOSE BLD-MCNC: 104 MG/DL (ref 74–99)
HCT VFR BLD CALC: 39.8 % (ref 34–48)
HEMOGLOBIN: 13.2 G/DL (ref 11.5–15.5)
IMMATURE GRANULOCYTES #: 0.05 E9/L
IMMATURE GRANULOCYTES %: 0.4 % (ref 0–5)
LYMPHOCYTES ABSOLUTE: 2.96 E9/L (ref 1.5–4)
LYMPHOCYTES RELATIVE PERCENT: 26 % (ref 20–42)
MCH RBC QN AUTO: 29.3 PG (ref 26–35)
MCHC RBC AUTO-ENTMCNC: 33.2 % (ref 32–34.5)
MCV RBC AUTO: 88.2 FL (ref 80–99.9)
MONOCYTES ABSOLUTE: 1.17 E9/L (ref 0.1–0.95)
MONOCYTES RELATIVE PERCENT: 10.3 % (ref 2–12)
NEUTROPHILS ABSOLUTE: 6.85 E9/L (ref 1.8–7.3)
NEUTROPHILS RELATIVE PERCENT: 60 % (ref 43–80)
PDW BLD-RTO: 13.2 FL (ref 11.5–15)
PLATELET # BLD: 421 E9/L (ref 130–450)
PMV BLD AUTO: 9.8 FL (ref 7–12)
POTASSIUM SERPL-SCNC: 4.3 MMOL/L (ref 3.5–5)
RBC # BLD: 4.51 E12/L (ref 3.5–5.5)
SODIUM BLD-SCNC: 137 MMOL/L (ref 132–146)
TOTAL PROTEIN: 6.8 G/DL (ref 6.4–8.3)
TROPONIN, HIGH SENSITIVITY: 14 NG/L (ref 0–9)
TROPONIN, HIGH SENSITIVITY: 15 NG/L (ref 0–9)
WBC # BLD: 11.4 E9/L (ref 4.5–11.5)

## 2023-02-06 PROCEDURE — 93005 ELECTROCARDIOGRAM TRACING: CPT | Performed by: STUDENT IN AN ORGANIZED HEALTH CARE EDUCATION/TRAINING PROGRAM

## 2023-02-06 PROCEDURE — 85025 COMPLETE CBC W/AUTO DIFF WBC: CPT

## 2023-02-06 PROCEDURE — 99285 EMERGENCY DEPT VISIT HI MDM: CPT

## 2023-02-06 PROCEDURE — 71046 X-RAY EXAM CHEST 2 VIEWS: CPT

## 2023-02-06 PROCEDURE — 36415 COLL VENOUS BLD VENIPUNCTURE: CPT

## 2023-02-06 PROCEDURE — 84484 ASSAY OF TROPONIN QUANT: CPT

## 2023-02-06 PROCEDURE — 80053 COMPREHEN METABOLIC PANEL: CPT

## 2023-02-06 ASSESSMENT — PAIN - FUNCTIONAL ASSESSMENT
PAIN_FUNCTIONAL_ASSESSMENT: NONE - DENIES PAIN
PAIN_FUNCTIONAL_ASSESSMENT: 0-10

## 2023-02-06 ASSESSMENT — PAIN DESCRIPTION - LOCATION: LOCATION: CHEST

## 2023-02-07 LAB
EKG ATRIAL RATE: 72 BPM
EKG P AXIS: -12 DEGREES
EKG P-R INTERVAL: 164 MS
EKG Q-T INTERVAL: 404 MS
EKG QRS DURATION: 70 MS
EKG QTC CALCULATION (BAZETT): 442 MS
EKG R AXIS: -37 DEGREES
EKG T AXIS: -6 DEGREES
EKG VENTRICULAR RATE: 72 BPM

## 2023-02-07 PROCEDURE — 93010 ELECTROCARDIOGRAM REPORT: CPT | Performed by: INTERNAL MEDICINE

## 2023-02-07 NOTE — ED PROVIDER NOTES
[address]    Pt Name: Georgette Person  MRN: [id number]  [place] 1955  Date of evaluation: 2/6/2023  Provider: Jerardo Loving MD  PCP: Donna Christian,   Note Started: 8:04 PM EST 2/6/23    HPI     Patient is a 74 y.o. female presents with a chief complaint of   Chief Complaint   Patient presents with    Chest Pain     MIDSTERNAL PAIN AFTER STANDING AT MOTHER'S ICU ROOM. MOTHER NOT WELL   . Patient presents with chest pain. Patient reportedly was at her mother's bedside in the ICU and was told patient's mother is not doing well. Denies any changes in urinary or bowel habits. Patient stated that she took a Tylenol and symptoms significantly improved. Patient is currently having no symptoms. Patient denies any shortness of breath. No recent surgery, recent travel, history of blood clots in the past.  Patient denies any abdominal pain. Nursing Notes were all reviewed and agreed with or any disagreements were addressed in the HPI. History From: Patient    Review of Systems   Positives and Pertinent negatives as per HPI. Physical Exam  Vitals and nursing note reviewed. Constitutional:       Appearance: She is well-developed. HENT:      Head: Normocephalic and atraumatic. Eyes:      Conjunctiva/sclera: Conjunctivae normal.   Cardiovascular:      Rate and Rhythm: Normal rate and regular rhythm. Heart sounds: Normal heart sounds. No murmur heard. Pulmonary:      Effort: Pulmonary effort is normal. No respiratory distress. Breath sounds: Normal breath sounds. No wheezing or rales. Abdominal:      General: Bowel sounds are normal.      Palpations: Abdomen is soft. Tenderness: There is no abdominal tenderness. There is no guarding or rebound. Musculoskeletal:      Cervical back: Normal range of motion and neck supple. Skin:     General: Skin is warm and dry.    Neurological: Mental Status: She is alert and oriented to person, place, and time. Cranial Nerves: No cranial nerve deficit. Coordination: Coordination normal.        Procedures       MDM     Amount and/or Complexity of Data Reviewed  Decide to obtain previous medical records or to obtain history from someone other than the patient: yes         ED Course as of 02/06/23 2004 Mon Feb 06, 2023 1935 ATTENDING PROVIDER ATTESTATION:     I have personally performed and/or participated in the history, exam, medical decision making, and procedures and agree with all pertinent clinical information unless otherwise noted. I have also reviewed and agree with the past medical, family and social history unless otherwise noted. I have discussed this patient in detail with the resident, and provided the instruction and education regarding patient here stating she was up in the ICU, her mother is very ill and not doing well and they told the patient and the family that it is only a matter of moments and they thought she might pass, the patient states she developed some midsternal chest pain at that time. The last a few moments and her pain resolved on its own, she now feels fine but was sent down for an evaluation. She had no associated palpitations or shortness of breath and no syncope or lightheadedness. No abdominal pain and no sweating or nausea. Right now she has no symptoms and feels fine she states. .  My findings/plan: Patient sitting in chair resting comfortably no distress. Heart rate regular with no murmur. Lungs are clear and equal.  Abdomen soft and nontender. No pretibial edema or calf pain. Awake and alert and conversant. No focal neurologic deficit. [NC]   1950 EKG, normal sinus rhythm rate of 72, left axis deviation. Normal conduction. No acute ischemic ST-T wave changes, otherwise agree with resident. [NC]   1954 EKG read interpreted by me. Rate 72 bpm.  Left axis deviation.   Normal [place] interval.  Poor R wave progression. Flattening of the T waves in the anterior septal leads. Mild changes from prior EKG. New T wave inversion in lead III. New T wave inversion in aVF. Compared to prior EKG on May 5, 2022 [JM]      ED Course User Index  [JM] Kezia Barbosa MD  [NC] Minerva Magaña DO      Patient is a 74 y.o. female presenting with chest pain. Patient reportedly had chest pain while at the bedside of her mother. Patient's EKG did have some EKG changes. Patient had 2 troponins that were negative. Patient's symptoms completely resolved. Patient clinically appears well. Patient's symptoms are low suspicion for ACS. Patient had a chest x-ray that was atelectasis versus pneumonia. Patient has no clinical signs of pneumonia. Patient is afebrile. Normal white count. Patient will be discharged home. Patient needs to go upstairs to follow up with her mother in the ICU. Discussed return precautions with patient who is agreeable to this plan. Fragile includes but is not limited to pneumonia, ACS, MI, pneumothorax    Patient was given return precautions. Patient will follow up with their primary care provider. Patient is agreeable to this plan. Patient has remained stable throughout their stay in the ED. Patient was seen and evaluated by myself and my attending Minerva Magaña DO. Assessment and Plan discussed with attending provider, please see attestation for final plan of care. This note was done using dictation software and there may be some grammatical errors associated with this.       CONSULTS:   None    Medications given in the emergency room:  Medications - No data to display     LABS:    Labs Reviewed   CBC WITH AUTO DIFFERENTIAL   COMPREHENSIVE METABOLIC PANEL   TROPONIN    Narrative:     High Sensitivity Troponin T       As interpreted by me, the above displayed labs are abnormal. All other labs obtained during this visit were within normal range or not returned as of this dictation. RADIOLOGY:   Non-plain film images such as CT, Ultrasound and MRI are read by the radiologist. Plain radiographic images are visualized and preliminarily interpreted by the ED Provider with the below findings:    Chest x-ray interpreted by me. Shows no pneumothorax or focal consolidation. Interpretation per the Radiologist below, if available at the time of this note:    XR CHEST (2 VW)    (Results Pending)     No results found. No results found. Boaz Sargent MD            ED Course as of 02/06/23 2203 Mon Feb 06, 2023   1935 ATTENDING PROVIDER ATTESTATION:     I have personally performed and/or participated in the history, exam, medical decision making, and procedures and agree with all pertinent clinical information unless otherwise noted. I have also reviewed and agree with the past medical, family and social history unless otherwise noted. I have discussed this patient in detail with the resident, and provided the instruction and education regarding patient here stating she was up in the ICU, her mother is very ill and not doing well and they told the patient and the family that it is only a matter of moments and they thought she might pass, the patient states she developed some midsternal chest pain at that time. The last a few moments and her pain resolved on its own, she now feels fine but was sent down for an evaluation. She had no associated palpitations or shortness of breath and no syncope or lightheadedness. No abdominal pain and no sweating or nausea. Right now she has no symptoms and feels fine she states. .  My findings/plan: Patient sitting in chair resting comfortably no distress. Heart rate regular with no murmur. Lungs are clear and equal.  Abdomen soft and nontender. No pretibial edema or calf pain. Awake and alert and conversant. No focal neurologic deficit. [NC]   1950 EKG, normal sinus rhythm rate of 72, left axis deviation.   Normal conduction. No acute ischemic ST-T wave changes, otherwise agree with resident. [NC]   1954 EKG read interpreted by me. Rate 72 bpm.  Left axis deviation. Normal [place] interval.  Poor R wave progression. Flattening of the T waves in the anterior septal leads. Mild changes from prior EKG. New T wave inversion in lead III. New T wave inversion in aVF. Compared to prior EKG on May 5, 2022 []      ED Course User Index  [] Neha Mendez MD  [NC] Daryle President, DO       --------------------------------------------- PAST HISTORY ---------------------------------------------  Past Medical History:  has a past medical history of Allergic rhinitis, Cerebrovascular disease, Cerebrovascular disease, Diabetes ([facility] [place] [address] Hyperlipidemia, Hypertension, Irregular heart beat, Myocardial infarct ([facility] [place] [address] Neuropathy, Obesity, Sleep apnea, Thyroid disease, and Weakness due to cerebrovascular accident (CVA). Past Surgical History:  has a past surgical history that includes Hysterectomy (2001); Colonoscopy (2000); Colonoscopy (2016); and Cholecystectomy, laparoscopic (N/A, 5/9/2022). Social History:  reports that she has never smoked. She has never used smokeless tobacco. She reports that she does not drink alcohol and does not use drugs. Family History: family history includes Cancer in her maternal grandmother; Clotting Disorder in her brother; Diabetes in her father, maternal aunt, mother, paternal grandmother, and sister; Heart Attack in her brother; Heart Disease in her brother, brother, father, paternal grandfather, and sister; High Blood Pressure in her brother, father, mother, and sister; High Cholesterol in her mother; Kidney Disease in her maternal aunt. The patients home medications have been reviewed.     Allergies: Latex, Doxycycline, Seasonal, and Adhesive tape    -------------------------------------------------- RESULTS -------------------------------------------------  Labs:  Results for orders placed or performed during the hospital encounter of 02/06/23   CBC with Auto Differential   Result Value Ref Range    WBC 11.4 4.5 - 11.5 E9/L    RBC 4.51 3.50 - 5.50 E12/L    Hemoglobin 13.2 11.5 - 15.5 g/dL    Hematocrit 39.8 34.0 - 48.0 %    MCV 88.2 80.0 - 99.9 fL    MCH 29.3 26.0 - 35.0 pg    MCHC 33.2 32.0 - 34.5 %    RDW 13.2 11.5 - 15.0 fL    Platelets 580 224 - 591 E9/L    MPV 9.8 7.0 - 12.0 fL    Neutrophils % 60.0 43.0 - 80.0 %    Immature Granulocytes % 0.4 0.0 - 5.0 %    Lymphocytes % 26.0 20.0 - 42.0 %    Monocytes % 10.3 2.0 - 12.0 %    Eosinophils % 2.9 0.0 - 6.0 %    Basophils % 0.4 0.0 - 2.0 %    Neutrophils Absolute 6.85 1.80 - 7.30 E9/L    Immature Granulocytes # 0.05 E9/L    Lymphocytes Absolute 2.96 1.50 - 4.00 E9/L    Monocytes Absolute 1.17 (H) 0.10 - 0.95 E9/L    Eosinophils Absolute 0.33 0.05 - 0.50 E9/L    Basophils Absolute 0.04 0.00 - 0.20 E9/L   CMP   Result Value Ref Range    Sodium 137 132 - 146 mmol/L    Potassium 4.3 3.5 - 5.0 mmol/L    Chloride 101 98 - 107 mmol/L    CO2 23 22 - 29 mmol/L    Anion Gap 13 7 - 16 mmol/L    Glucose 104 (H) 74 - 99 mg/dL    BUN 22 6 - 23 mg/dL    Creatinine 1.1 (H) 0.5 - 1.0 mg/dL    Est, Glom Filt Rate 56 >=60 mL/min/1.73    Calcium 8.9 8.6 - 10.2 mg/dL    Total Protein 6.8 6.4 - 8.3 g/dL    Albumin 3.9 3.5 - 5.2 g/dL    Total Bilirubin 0.4 0.0 - 1.2 mg/dL    Alkaline Phosphatase 52 35 - 104 U/L    ALT 28 0 - 32 U/L    AST 21 0 - 31 U/L   Troponin   Result Value Ref Range    Troponin, High Sensitivity 15 (H) 0 - 9 ng/L   Troponin   Result Value Ref Range    Troponin, High Sensitivity 14 (H) 0 - 9 ng/L       Radiology:  XR CHEST (2 VW)   Final Result   Right lower lobe opacities consistent with atelectasis and/or pneumonia.             ------------------------- NURSING NOTES AND VITALS REVIEWED ---------------------------  Date / Time Roomed:  2/6/2023  7:02 PM  ED Bed Assignment:  26/26    The nursing notes within the ED encounter and vital signs as below have been reviewed. BP (!) 127/56   Pulse 71   Temp 97.8 °F (36.6 °C) (Oral)   Resp 18   Wt 263 lb 3 oz (119.4 kg)   SpO2 98%   BMI 49.73 kg/m²   Oxygen Saturation Interpretation: Normal      ------------------------------------------ PROGRESS NOTES ------------------------------------------  10:03 PM EST  I have spoken with the patient and family if present and discussed todays results, in addition to providing specific details for the plan of care and counseling regarding the diagnosis and prognosis. Their questions are answered at this time and they are agreeable with the plan. I discussed at length with them reasons for immediate return here for re evaluation. They will followup with their primary care provider by calling their office as soon as possible.      --------------------------------- ADDITIONAL PROVIDER NOTES ---------------------------------  At this time the patient is without objective evidence of an acute process requiring hospitalization or inpatient management. They have remained hemodynamically stable throughout their entire ED visit and are stable for discharge with outpatient follow-up. The plan has been discussed in detail and they are aware of the specific conditions for emergent return, as well as the importance of follow-up. Discharge Medication List as of 2/6/2023  9:39 PM          Diagnosis:  1. Chest pain, unspecified type        Disposition:  Patient's disposition: Discharge to home  Patient's condition is stable. Emmanuel Denny MD  Resident  02/06/23 4244

## 2023-08-23 ENCOUNTER — HOSPITAL ENCOUNTER (OUTPATIENT)
Dept: NUCLEAR MEDICINE | Age: 68
Discharge: HOME | End: 2023-08-25
Payer: MEDICARE

## 2023-08-23 DIAGNOSIS — E05.90 THYROTOXICOSIS WITHOUT THYROID STORM, UNSPECIFIED THYROTOXICOSIS TYPE: ICD-10-CM

## 2023-08-23 PROCEDURE — 3430000000 HC RX DIAGNOSTIC RADIOPHARMACEUTICAL: Performed by: RADIOLOGY

## 2023-08-23 PROCEDURE — 79005 NUCLEAR RX ORAL ADMIN: CPT

## 2023-08-23 PROCEDURE — A9517 I131 IODIDE CAP, RX: HCPCS | Performed by: RADIOLOGY

## 2023-08-23 RX ADMIN — SODIUM IODIDE I 131 30 MILLICURIE: 1 CAPSULE, GELATIN COATED ORAL at 10:00

## 2024-07-11 ENCOUNTER — APPOINTMENT (OUTPATIENT)
Dept: CT IMAGING | Age: 69
End: 2024-07-11
Payer: MEDICARE

## 2024-07-11 ENCOUNTER — APPOINTMENT (OUTPATIENT)
Dept: GENERAL RADIOLOGY | Age: 69
End: 2024-07-11
Payer: MEDICARE

## 2024-07-11 ENCOUNTER — HOSPITAL ENCOUNTER (INPATIENT)
Age: 69
LOS: 5 days | Discharge: HOME | End: 2024-07-16
Attending: EMERGENCY MEDICINE | Admitting: INTERNAL MEDICINE
Payer: MEDICARE

## 2024-07-11 DIAGNOSIS — M62.82 NON-TRAUMATIC RHABDOMYOLYSIS: ICD-10-CM

## 2024-07-11 DIAGNOSIS — N17.9 ACUTE RENAL FAILURE, UNSPECIFIED ACUTE RENAL FAILURE TYPE (HCC): Primary | ICD-10-CM

## 2024-07-11 DIAGNOSIS — E86.0 DEHYDRATION: ICD-10-CM

## 2024-07-11 DIAGNOSIS — R19.7 DIARRHEA, UNSPECIFIED TYPE: ICD-10-CM

## 2024-07-11 LAB
ALBUMIN SERPL-MCNC: 4.2 G/DL (ref 3.5–5.2)
ALP SERPL-CCNC: 58 U/L (ref 35–104)
ALT SERPL-CCNC: 26 U/L (ref 0–32)
ANION GAP SERPL CALCULATED.3IONS-SCNC: 18 MMOL/L (ref 7–16)
AST SERPL-CCNC: 49 U/L (ref 0–31)
B-OH-BUTYR SERPL-MCNC: 1.01 MMOL/L (ref 0.02–0.27)
BACTERIA URNS QL MICRO: ABNORMAL
BASOPHILS # BLD: 0.05 K/UL (ref 0–0.2)
BASOPHILS NFR BLD: 0 % (ref 0–2)
BILIRUB SERPL-MCNC: 0.5 MG/DL (ref 0–1.2)
BILIRUB UR QL STRIP: NEGATIVE
BUN SERPL-MCNC: 28 MG/DL (ref 6–23)
CALCIUM SERPL-MCNC: 9.2 MG/DL (ref 8.6–10.2)
CHLORIDE SERPL-SCNC: 99 MMOL/L (ref 98–107)
CK SERPL-CCNC: 1234 U/L (ref 20–180)
CLARITY UR: CLEAR
CO2 SERPL-SCNC: 20 MMOL/L (ref 22–29)
COLOR UR: YELLOW
CREAT SERPL-MCNC: 2.6 MG/DL (ref 0.5–1)
EKG ATRIAL RATE: 107 BPM
EKG P AXIS: 39 DEGREES
EKG P-R INTERVAL: 166 MS
EKG Q-T INTERVAL: 348 MS
EKG QRS DURATION: 80 MS
EKG QTC CALCULATION (BAZETT): 464 MS
EKG R AXIS: -49 DEGREES
EKG T AXIS: 72 DEGREES
EKG VENTRICULAR RATE: 107 BPM
EOSINOPHIL # BLD: 0.02 K/UL (ref 0.05–0.5)
EOSINOPHILS RELATIVE PERCENT: 0 % (ref 0–6)
EPI CELLS #/AREA URNS HPF: ABNORMAL /HPF
ERYTHROCYTE [DISTWIDTH] IN BLOOD BY AUTOMATED COUNT: 13.9 % (ref 11.5–15)
GFR, ESTIMATED: 20 ML/MIN/1.73M2
GLUCOSE SERPL-MCNC: 166 MG/DL (ref 74–99)
GLUCOSE UR STRIP-MCNC: 500 MG/DL
HCT VFR BLD AUTO: 45 % (ref 34–48)
HGB BLD-MCNC: 14.2 G/DL (ref 11.5–15.5)
HGB UR QL STRIP.AUTO: NEGATIVE
IMM GRANULOCYTES # BLD AUTO: 0.1 K/UL (ref 0–0.58)
IMM GRANULOCYTES NFR BLD: 1 % (ref 0–5)
KETONES UR STRIP-MCNC: NEGATIVE MG/DL
LACTATE BLDV-SCNC: 1.8 MMOL/L (ref 0.5–2.2)
LACTATE BLDV-SCNC: 2.5 MMOL/L (ref 0.5–1.9)
LACTATE BLDV-SCNC: 2.6 MMOL/L (ref 0.5–1.9)
LEUKOCYTE ESTERASE UR QL STRIP: NEGATIVE
LYMPHOCYTES NFR BLD: 1.2 K/UL (ref 1.5–4)
LYMPHOCYTES RELATIVE PERCENT: 10 % (ref 20–42)
MCH RBC QN AUTO: 29.3 PG (ref 26–35)
MCHC RBC AUTO-ENTMCNC: 31.6 G/DL (ref 32–34.5)
MCV RBC AUTO: 93 FL (ref 80–99.9)
MONOCYTES NFR BLD: 1.08 K/UL (ref 0.1–0.95)
MONOCYTES NFR BLD: 9 % (ref 2–12)
NEUTROPHILS NFR BLD: 81 % (ref 43–80)
NEUTS SEG NFR BLD: 10.17 K/UL (ref 1.8–7.3)
NITRITE UR QL STRIP: NEGATIVE
PH UR STRIP: 6 [PH] (ref 5–9)
PH VENOUS: 7.31 (ref 7.35–7.45)
PLATELET CONFIRMATION: NORMAL
PLATELET, FLUORESCENCE: 283 K/UL (ref 130–450)
PMV BLD AUTO: 9.5 FL (ref 7–12)
POTASSIUM SERPL-SCNC: 4.2 MMOL/L (ref 3.5–5)
PROT SERPL-MCNC: 8.2 G/DL (ref 6.4–8.3)
PROT UR STRIP-MCNC: ABNORMAL MG/DL
RBC # BLD AUTO: 4.84 M/UL (ref 3.5–5.5)
RBC # BLD: ABNORMAL 10*6/UL
RBC #/AREA URNS HPF: ABNORMAL /HPF
SODIUM SERPL-SCNC: 137 MMOL/L (ref 132–146)
SP GR UR STRIP: 1.02 (ref 1–1.03)
TROPONIN I SERPL HS-MCNC: 38 NG/L (ref 0–9)
TROPONIN I SERPL HS-MCNC: 46 NG/L (ref 0–9)
UROBILINOGEN UR STRIP-ACNC: 0.2 EU/DL (ref 0–1)
WBC #/AREA URNS HPF: ABNORMAL /HPF
WBC OTHER # BLD: 12.6 K/UL (ref 4.5–11.5)

## 2024-07-11 PROCEDURE — 81001 URINALYSIS AUTO W/SCOPE: CPT

## 2024-07-11 PROCEDURE — 6360000004 HC RX CONTRAST MEDICATION: Performed by: RADIOLOGY

## 2024-07-11 PROCEDURE — 93005 ELECTROCARDIOGRAM TRACING: CPT | Performed by: EMERGENCY MEDICINE

## 2024-07-11 PROCEDURE — 6370000000 HC RX 637 (ALT 250 FOR IP)

## 2024-07-11 PROCEDURE — 87449 NOS EACH ORGANISM AG IA: CPT

## 2024-07-11 PROCEDURE — 80053 COMPREHEN METABOLIC PANEL: CPT

## 2024-07-11 PROCEDURE — 99285 EMERGENCY DEPT VISIT HI MDM: CPT

## 2024-07-11 PROCEDURE — 84484 ASSAY OF TROPONIN QUANT: CPT

## 2024-07-11 PROCEDURE — 85025 COMPLETE CBC W/AUTO DIFF WBC: CPT

## 2024-07-11 PROCEDURE — 2580000003 HC RX 258

## 2024-07-11 PROCEDURE — 87329 GIARDIA AG IA: CPT

## 2024-07-11 PROCEDURE — 82800 BLOOD PH: CPT

## 2024-07-11 PROCEDURE — 87324 CLOSTRIDIUM AG IA: CPT

## 2024-07-11 PROCEDURE — 96374 THER/PROPH/DIAG INJ IV PUSH: CPT

## 2024-07-11 PROCEDURE — 93010 ELECTROCARDIOGRAM REPORT: CPT | Performed by: INTERNAL MEDICINE

## 2024-07-11 PROCEDURE — 6360000002 HC RX W HCPCS

## 2024-07-11 PROCEDURE — 6360000002 HC RX W HCPCS: Performed by: EMERGENCY MEDICINE

## 2024-07-11 PROCEDURE — 71045 X-RAY EXAM CHEST 1 VIEW: CPT

## 2024-07-11 PROCEDURE — 2580000003 HC RX 258: Performed by: EMERGENCY MEDICINE

## 2024-07-11 PROCEDURE — 87425 ROTAVIRUS AG IA: CPT

## 2024-07-11 PROCEDURE — 87798 DETECT AGENT NOS DNA AMP: CPT

## 2024-07-11 PROCEDURE — 2060000000 HC ICU INTERMEDIATE R&B

## 2024-07-11 PROCEDURE — 51701 INSERT BLADDER CATHETER: CPT

## 2024-07-11 PROCEDURE — 82010 KETONE BODYS QUAN: CPT

## 2024-07-11 PROCEDURE — 87040 BLOOD CULTURE FOR BACTERIA: CPT

## 2024-07-11 PROCEDURE — 73562 X-RAY EXAM OF KNEE 3: CPT

## 2024-07-11 PROCEDURE — 96361 HYDRATE IV INFUSION ADD-ON: CPT

## 2024-07-11 PROCEDURE — 83605 ASSAY OF LACTIC ACID: CPT

## 2024-07-11 PROCEDURE — 74176 CT ABD & PELVIS W/O CONTRAST: CPT

## 2024-07-11 PROCEDURE — 82550 ASSAY OF CK (CPK): CPT

## 2024-07-11 RX ORDER — METRONIDAZOLE 500 MG/100ML
500 INJECTION, SOLUTION INTRAVENOUS ONCE
Status: DISCONTINUED | OUTPATIENT
Start: 2024-07-11 | End: 2024-07-11 | Stop reason: SDUPTHER

## 2024-07-11 RX ORDER — ATORVASTATIN CALCIUM 10 MG/1
20 TABLET, FILM COATED ORAL DAILY
Status: DISCONTINUED | OUTPATIENT
Start: 2024-07-11 | End: 2024-07-16 | Stop reason: HOSPADM

## 2024-07-11 RX ORDER — PANTOPRAZOLE SODIUM 40 MG/1
40 TABLET, DELAYED RELEASE ORAL
Status: DISCONTINUED | OUTPATIENT
Start: 2024-07-12 | End: 2024-07-16 | Stop reason: HOSPADM

## 2024-07-11 RX ORDER — METOPROLOL TARTRATE 50 MG/1
50 TABLET, FILM COATED ORAL 2 TIMES DAILY
Status: DISCONTINUED | OUTPATIENT
Start: 2024-07-11 | End: 2024-07-16 | Stop reason: HOSPADM

## 2024-07-11 RX ORDER — CIPROFLOXACIN 2 MG/ML
200 INJECTION, SOLUTION INTRAVENOUS EVERY 12 HOURS
Status: DISCONTINUED | OUTPATIENT
Start: 2024-07-11 | End: 2024-07-14

## 2024-07-11 RX ORDER — SODIUM CHLORIDE 9 MG/ML
INJECTION, SOLUTION INTRAVENOUS CONTINUOUS
Status: DISCONTINUED | OUTPATIENT
Start: 2024-07-11 | End: 2024-07-15

## 2024-07-11 RX ORDER — ENOXAPARIN SODIUM 100 MG/ML
30 INJECTION SUBCUTANEOUS DAILY
Status: DISCONTINUED | OUTPATIENT
Start: 2024-07-11 | End: 2024-07-13

## 2024-07-11 RX ORDER — ISOSORBIDE MONONITRATE 30 MG/1
30 TABLET, EXTENDED RELEASE ORAL DAILY
Status: DISCONTINUED | OUTPATIENT
Start: 2024-07-11 | End: 2024-07-16 | Stop reason: HOSPADM

## 2024-07-11 RX ORDER — ONDANSETRON 2 MG/ML
4 INJECTION INTRAMUSCULAR; INTRAVENOUS ONCE
Status: COMPLETED | OUTPATIENT
Start: 2024-07-11 | End: 2024-07-11

## 2024-07-11 RX ORDER — METRONIDAZOLE 500 MG/100ML
500 INJECTION, SOLUTION INTRAVENOUS EVERY 8 HOURS
Status: DISCONTINUED | OUTPATIENT
Start: 2024-07-11 | End: 2024-07-14

## 2024-07-11 RX ORDER — AMLODIPINE BESYLATE 5 MG/1
10 TABLET ORAL DAILY
Status: DISCONTINUED | OUTPATIENT
Start: 2024-07-11 | End: 2024-07-16 | Stop reason: HOSPADM

## 2024-07-11 RX ORDER — 0.9 % SODIUM CHLORIDE 0.9 %
1000 INTRAVENOUS SOLUTION INTRAVENOUS ONCE
Status: COMPLETED | OUTPATIENT
Start: 2024-07-11 | End: 2024-07-11

## 2024-07-11 RX ORDER — ASPIRIN 325 MG
325 TABLET, DELAYED RELEASE (ENTERIC COATED) ORAL DAILY
Status: DISCONTINUED | OUTPATIENT
Start: 2024-07-11 | End: 2024-07-16 | Stop reason: HOSPADM

## 2024-07-11 RX ORDER — LISINOPRIL 20 MG/1
20 TABLET ORAL DAILY
Status: DISCONTINUED | OUTPATIENT
Start: 2024-07-11 | End: 2024-07-14

## 2024-07-11 RX ORDER — ENOXAPARIN SODIUM 100 MG/ML
40 INJECTION SUBCUTANEOUS DAILY
Status: DISCONTINUED | OUTPATIENT
Start: 2024-07-11 | End: 2024-07-11 | Stop reason: DRUGHIGH

## 2024-07-11 RX ADMIN — ISOSORBIDE MONONITRATE 30 MG: 30 TABLET, EXTENDED RELEASE ORAL at 15:19

## 2024-07-11 RX ADMIN — SODIUM CHLORIDE 1000 ML: 9 INJECTION, SOLUTION INTRAVENOUS at 12:01

## 2024-07-11 RX ADMIN — CEFTRIAXONE SODIUM 2000 MG: 2 INJECTION, POWDER, FOR SOLUTION INTRAMUSCULAR; INTRAVENOUS at 14:47

## 2024-07-11 RX ADMIN — METRONIDAZOLE 500 MG: 500 INJECTION, SOLUTION INTRAVENOUS at 15:25

## 2024-07-11 RX ADMIN — LISINOPRIL 20 MG: 20 TABLET ORAL at 15:18

## 2024-07-11 RX ADMIN — SODIUM CHLORIDE 1000 ML: 9 INJECTION, SOLUTION INTRAVENOUS at 10:17

## 2024-07-11 RX ADMIN — IOPAMIDOL 18 ML: 755 INJECTION, SOLUTION INTRAVENOUS at 13:00

## 2024-07-11 RX ADMIN — ENOXAPARIN SODIUM 30 MG: 100 INJECTION SUBCUTANEOUS at 15:19

## 2024-07-11 RX ADMIN — METOPROLOL TARTRATE 50 MG: 50 TABLET, FILM COATED ORAL at 16:39

## 2024-07-11 RX ADMIN — SODIUM CHLORIDE: 9 INJECTION, SOLUTION INTRAVENOUS at 14:51

## 2024-07-11 RX ADMIN — SODIUM CHLORIDE 1000 ML: 9 INJECTION, SOLUTION INTRAVENOUS at 09:25

## 2024-07-11 RX ADMIN — ATORVASTATIN CALCIUM 20 MG: 20 TABLET, FILM COATED ORAL at 15:19

## 2024-07-11 RX ADMIN — METRONIDAZOLE 500 MG: 500 INJECTION, SOLUTION INTRAVENOUS at 23:04

## 2024-07-11 RX ADMIN — ONDANSETRON 4 MG: 2 INJECTION INTRAMUSCULAR; INTRAVENOUS at 10:17

## 2024-07-11 RX ADMIN — AMLODIPINE BESYLATE 10 MG: 5 TABLET ORAL at 15:19

## 2024-07-11 ASSESSMENT — PAIN DESCRIPTION - LOCATION: LOCATION: ABDOMEN

## 2024-07-11 ASSESSMENT — PAIN SCALES - GENERAL: PAINLEVEL_OUTOF10: 3

## 2024-07-11 ASSESSMENT — PAIN DESCRIPTION - DESCRIPTORS: DESCRIPTORS: CRAMPING

## 2024-07-11 NOTE — ED PROVIDER NOTES
Chief complaint:  Fatigue, fall        HPI history provided by the patient  Patient presents here states she has had diarrhea for last couple days with some abdominal cramping and aching.  No vomiting.  No chest pain, palpitations or shortness of breath.  She states she feels weak and fatigued now and was trying to get out of bed and slipped and slumped to the ground, she states she bumped her right knee but did not hit her head or neck or back but was unable to get up due to fatigue weakness so came into the ER.  Denies arm or leg pain or injuries otherwise and denies extremity numbness, tingling, paresthesia or weakness.  No head injury or headache and no neck or back pain.    Review of Systems   Constitutional:  Positive for fatigue. Negative for chills, diaphoresis and fever.   HENT:  Negative for congestion and sore throat.    Respiratory:  Negative for cough, chest tightness, shortness of breath and wheezing.    Cardiovascular:  Negative for chest pain, palpitations and leg swelling.   Gastrointestinal:  Positive for abdominal pain and diarrhea. Negative for abdominal distention, blood in stool, nausea and vomiting.   Genitourinary:  Negative for dysuria, flank pain, frequency, hematuria and urgency.   Musculoskeletal:  Positive for arthralgias. Negative for back pain, gait problem, joint swelling, myalgias, neck pain and neck stiffness.   Skin:  Negative for rash and wound.   Neurological:  Negative for dizziness, seizures, syncope, weakness, light-headedness, numbness and headaches.   All other systems reviewed and are negative.       Physical Exam  Vitals and nursing note reviewed.   Constitutional:       General: She is awake. She is not in acute distress.     Appearance: She is well-developed. She is obese. She is not ill-appearing, toxic-appearing or diaphoretic.   HENT:      Head: Normocephalic and atraumatic.      Comments: No sign of acute head or face injury  Eyes:      Pupils: Pupils are equal,  colitis extending   to the descending portion without perforation or abscess.   2. Ventral abdominal wall hernia containing a single loop of small bowel   Escalera type hernia however without evidence for acute inflammation or   obstruction.   3. Hepatic steatosis.   4. Cardiomegaly.   5. Fat containing right direct inguinal hernia.               ------------------------- NURSING NOTES AND VITALS REVIEWED ---------------------------  Date / Time Roomed:  7/11/2024  7:43 AM  ED Bed Assignment:  07/07    The nursing notes within the ED encounter and vital signs as below have been reviewed.   Patient Vitals for the past 24 hrs:   BP Temp Temp src Pulse Resp SpO2 Height Weight   07/11/24 1330 (!) 136/59 98.8 °F (37.1 °C) Oral (!) 102 19 92 % -- --   07/11/24 1316 -- -- -- (!) 103 28 91 % -- --   07/11/24 1315 -- -- -- 98 26 91 % -- --   07/11/24 1314 -- -- -- (!) 104 18 -- -- --   07/11/24 1241 -- -- -- 100 18 -- -- --   07/11/24 0915 -- 97.9 °F (36.6 °C) Oral (!) 108 18 -- -- --   07/11/24 0900 -- -- -- (!) 109 19 -- -- --   07/11/24 0818 -- -- -- -- -- -- 1.549 m (5' 1\") --   07/11/24 0753 (!) 127/57 100.3 °F (37.9 °C) Oral 93 20 95 % -- 119.3 kg (263 lb)       Oxygen Saturation Interpretation: Normal      ------------------------------------------ PROGRESS NOTES ------------------------------------------  Re-evaluation(s):  Time: 1330.  Patient’s symptoms show no change  Repeat physical examination is not changed      I have spoken with the patient and discussed today’s results, in addition to providing specific details for the plan of care and counseling regarding the diagnosis and prognosis.  Their questions are answered at this time and they are agreeable with the plan.      --------------------------------- ADDITIONAL PROVIDER NOTES ---------------------------------  Consultations:    This patient's ED course included: a personal history and physicial examination, re-evaluation prior to disposition, multiple

## 2024-07-11 NOTE — PROGRESS NOTES
Pharmacist Review and Automatic Dose Adjustment of Prophylactic Enoxaparin    Reviewed reason(s) for admission/hospital problem list    The reviewing pharmacist has made an adjustment to the ordered enoxaparin dose or converted to UFH per the approved [facility] protocol and table as identified below.        Lyla Ruiz is a 68 y.o. female.     Recent Labs     07/11/24  0823   CREATININE 2.6*       Estimated Creatinine Clearance: 26 mL/min (A) (based on SCr of 2.6 mg/dL (H)).    Recent Labs     07/11/24  0823   HGB 14.2   HCT 45.0     No results for input(s): \"INR\" in the last 72 hours.    Height:   Ht Readings from Last 1 Encounters:   07/11/24 1.549 m (5' 1\")     Weight:  Wt Readings from Last 1 Encounters:   07/11/24 119.3 kg (263 lb)               Plan: Based upon the patient's weight and renal function    Ordered: Enoxaparin 40mg SUBQ Daily    Changed/converted to    New Order: Enoxaparin 30mg SUBQ Daily      Thank you,  Cheryl Weller RP  7/11/2024, 3:02 PM

## 2024-07-12 LAB
25(OH)D3 SERPL-MCNC: 33.8 NG/ML (ref 30–100)
ALBUMIN SERPL-MCNC: 3.3 G/DL (ref 3.5–5.2)
ALP SERPL-CCNC: 57 U/L (ref 35–104)
ALT SERPL-CCNC: 20 U/L (ref 0–32)
ANION GAP SERPL CALCULATED.3IONS-SCNC: 13 MMOL/L (ref 7–16)
AST SERPL-CCNC: 33 U/L (ref 0–31)
BASOPHILS # BLD: 0.11 K/UL (ref 0–0.2)
BASOPHILS NFR BLD: 1 % (ref 0–2)
BILIRUB SERPL-MCNC: 0.3 MG/DL (ref 0–1.2)
BUN SERPL-MCNC: 27 MG/DL (ref 6–23)
C DIFF GDH + TOXINS A+B STL QL IA.RAPID: NEGATIVE
CALCIUM SERPL-MCNC: 7.7 MG/DL (ref 8.6–10.2)
CHLORIDE SERPL-SCNC: 102 MMOL/L (ref 98–107)
CHOLEST SERPL-MCNC: 97 MG/DL
CK SERPL-CCNC: 797 U/L (ref 20–180)
CO2 SERPL-SCNC: 21 MMOL/L (ref 22–29)
CREAT SERPL-MCNC: 1.8 MG/DL (ref 0.5–1)
EOSINOPHIL # BLD: 0 K/UL (ref 0.05–0.5)
EOSINOPHILS RELATIVE PERCENT: 0 % (ref 0–6)
ERYTHROCYTE [DISTWIDTH] IN BLOOD BY AUTOMATED COUNT: 13.9 % (ref 11.5–15)
FOLATE SERPL-MCNC: >20 NG/ML (ref 4.8–24.2)
G LAMBLIA AG STL QL IA: NEGATIVE
GFR, ESTIMATED: 30 ML/MIN/1.73M2
GLUCOSE SERPL-MCNC: 151 MG/DL (ref 74–99)
HCT VFR BLD AUTO: 34.2 % (ref 34–48)
HDLC SERPL-MCNC: 31 MG/DL
HGB BLD-MCNC: 11 G/DL (ref 11.5–15.5)
IRON SATN MFR SERPL: 5 % (ref 15–50)
IRON SERPL-MCNC: 11 UG/DL (ref 37–145)
LDLC SERPL CALC-MCNC: 40 MG/DL
LYMPHOCYTES NFR BLD: 1.43 K/UL (ref 1.5–4)
LYMPHOCYTES RELATIVE PERCENT: 13 % (ref 20–42)
MCH RBC QN AUTO: 30.5 PG (ref 26–35)
MCHC RBC AUTO-ENTMCNC: 32.2 G/DL (ref 32–34.5)
MCV RBC AUTO: 94.7 FL (ref 80–99.9)
MONOCYTES NFR BLD: 1.87 K/UL (ref 0.1–0.95)
MONOCYTES NFR BLD: 17 % (ref 2–12)
NEUTROPHILS NFR BLD: 69 % (ref 43–80)
NEUTS SEG NFR BLD: 7.59 K/UL (ref 1.8–7.3)
PLATELET # BLD AUTO: 226 K/UL (ref 130–450)
PMV BLD AUTO: 9.4 FL (ref 7–12)
POTASSIUM SERPL-SCNC: 4 MMOL/L (ref 3.5–5)
PROT SERPL-MCNC: 6 G/DL (ref 6.4–8.3)
RBC # BLD AUTO: 3.61 M/UL (ref 3.5–5.5)
ROTAVIRUS ANTIGEN: NEGATIVE
SODIUM SERPL-SCNC: 136 MMOL/L (ref 132–146)
SOURCE, 60200063: NORMAL
SOURCE: NORMAL
SPECIMEN DESCRIPTION: NORMAL
SPECIMEN DESCRIPTION: NORMAL
T4 FREE SERPL-MCNC: 1.1 NG/DL (ref 0.9–1.7)
TIBC SERPL-MCNC: 236 UG/DL (ref 250–450)
TRIGL SERPL-MCNC: 132 MG/DL
TROPONIN I SERPL HS-MCNC: 29 NG/L (ref 0–9)
TSH SERPL DL<=0.05 MIU/L-ACNC: 3.41 UIU/ML (ref 0.27–4.2)
VIT B12 SERPL-MCNC: 196 PG/ML (ref 211–946)
VLDLC SERPL CALC-MCNC: 26 MG/DL
WBC OTHER # BLD: 11 K/UL (ref 4.5–11.5)

## 2024-07-12 PROCEDURE — 85025 COMPLETE CBC W/AUTO DIFF WBC: CPT

## 2024-07-12 PROCEDURE — 80061 LIPID PANEL: CPT

## 2024-07-12 PROCEDURE — 2060000000 HC ICU INTERMEDIATE R&B

## 2024-07-12 PROCEDURE — 82607 VITAMIN B-12: CPT

## 2024-07-12 PROCEDURE — 84439 ASSAY OF FREE THYROXINE: CPT

## 2024-07-12 PROCEDURE — 83540 ASSAY OF IRON: CPT

## 2024-07-12 PROCEDURE — 36415 COLL VENOUS BLD VENIPUNCTURE: CPT

## 2024-07-12 PROCEDURE — 82550 ASSAY OF CK (CPK): CPT

## 2024-07-12 PROCEDURE — 84484 ASSAY OF TROPONIN QUANT: CPT

## 2024-07-12 PROCEDURE — 6360000002 HC RX W HCPCS

## 2024-07-12 PROCEDURE — 76937 US GUIDE VASCULAR ACCESS: CPT

## 2024-07-12 PROCEDURE — 82306 VITAMIN D 25 HYDROXY: CPT

## 2024-07-12 PROCEDURE — 93005 ELECTROCARDIOGRAM TRACING: CPT

## 2024-07-12 PROCEDURE — 83550 IRON BINDING TEST: CPT

## 2024-07-12 PROCEDURE — 80053 COMPREHEN METABOLIC PANEL: CPT

## 2024-07-12 PROCEDURE — 82746 ASSAY OF FOLIC ACID SERUM: CPT

## 2024-07-12 PROCEDURE — 84443 ASSAY THYROID STIM HORMONE: CPT

## 2024-07-12 PROCEDURE — 6370000000 HC RX 637 (ALT 250 FOR IP)

## 2024-07-12 RX ORDER — LACTOBACILLUS RHAMNOSUS GG 10B CELL
1 CAPSULE ORAL DAILY
Status: DISCONTINUED | OUTPATIENT
Start: 2024-07-12 | End: 2024-07-16 | Stop reason: HOSPADM

## 2024-07-12 RX ORDER — LACTOBACILLUS RHAMNOSUS GG 10B CELL
1 CAPSULE ORAL DAILY
Status: DISCONTINUED | OUTPATIENT
Start: 2024-07-12 | End: 2024-07-12 | Stop reason: SDUPTHER

## 2024-07-12 RX ADMIN — ISOSORBIDE MONONITRATE 30 MG: 30 TABLET, EXTENDED RELEASE ORAL at 09:55

## 2024-07-12 RX ADMIN — METOPROLOL TARTRATE 50 MG: 50 TABLET, FILM COATED ORAL at 09:55

## 2024-07-12 RX ADMIN — ASPIRIN 325 MG: 325 TABLET, COATED ORAL at 09:55

## 2024-07-12 RX ADMIN — ENOXAPARIN SODIUM 30 MG: 100 INJECTION SUBCUTANEOUS at 15:33

## 2024-07-12 RX ADMIN — AMLODIPINE BESYLATE 10 MG: 5 TABLET ORAL at 09:55

## 2024-07-12 RX ADMIN — METOPROLOL TARTRATE 50 MG: 50 TABLET, FILM COATED ORAL at 20:32

## 2024-07-12 RX ADMIN — CIPROFLOXACIN 200 MG: 200 INJECTION, SOLUTION INTRAVENOUS at 15:30

## 2024-07-12 RX ADMIN — Medication 1 CAPSULE: at 13:02

## 2024-07-12 RX ADMIN — METRONIDAZOLE 500 MG: 500 INJECTION, SOLUTION INTRAVENOUS at 06:16

## 2024-07-12 RX ADMIN — CIPROFLOXACIN 200 MG: 200 INJECTION, SOLUTION INTRAVENOUS at 03:10

## 2024-07-12 RX ADMIN — METRONIDAZOLE 500 MG: 500 INJECTION, SOLUTION INTRAVENOUS at 15:33

## 2024-07-12 RX ADMIN — PANTOPRAZOLE SODIUM 40 MG: 40 TABLET, DELAYED RELEASE ORAL at 06:17

## 2024-07-12 RX ADMIN — METRONIDAZOLE 500 MG: 500 INJECTION, SOLUTION INTRAVENOUS at 23:50

## 2024-07-12 RX ADMIN — LISINOPRIL 20 MG: 20 TABLET ORAL at 09:55

## 2024-07-12 RX ADMIN — ATORVASTATIN CALCIUM 20 MG: 20 TABLET, FILM COATED ORAL at 09:55

## 2024-07-12 NOTE — CONSULTS
stable   Her symptoms started after eating veg shrimp rice and chicken   Recently started on   Lives with family  Has a dog 2 cats  IP CONSULT TO INFECTIOUS DISEASES  IP CONSULT TO VASCULAR ACCESS TEAM    Currently on  Inpat Anti-Infectives (From admission, onward)       Start     Ordered Stop    07/11/24 1530  ciprofloxacin (CIPRO) IVPB 200 mg  200 mg,   IntraVENous,   EVERY 12 HOURS         07/11/24 1440 --    07/11/24 1515  metroNIDAZOLE (FLAGYL) 500 mg in 0.9% NaCl 100 mL IVPB premix  500 mg,   IntraVENous,   EVERY 8 HOURS         07/11/24 1440 --                      REVIEW OF SYSTEMS     CONSTITUTIONAL:   No fever, chills, weight loss  ALLERGIES:    No rash or itch  EYES:     No visual changes  ENT:      No  hearing loss or sore throat  CARDIOVASCULAR:   No chest pain or palpitations  RESPIRATORY:   No cough, sob  ENDOCRINE:    No increase thirst, urination   HEME-LYMPH:   No easy bruising or bleeding disorder  GI:     No nausea, vomiting or diarrhea  :     No urinary complaints  NEURO:    No seizures, stroke, HA  MUSCULOSKELETAL:  No muscle aches or pain, no joint pain  SKIN:     No rash, ulcers or wounds  PSYCH:    No depression or anxiety    Medications Prior to Admission: Fish Oil-Krill Oil (MEGARED ADVANCED 4 IN 1 PO), Take by mouth  isosorbide mononitrate (IMDUR) 30 MG extended release tablet, Take 30 mg by mouth daily (Patient not taking: Reported on 7/11/2024)  NONFORMULARY,   Semaglutide (OZEMPIC, 2 MG/DOSE, SC), Inject into the skin (Patient not taking: Reported on 7/11/2024)  Ertugliflozin L-PyroglutamicAc (STEGLATRO PO), Take by mouth  cephALEXin (KEFLEX) 500 MG capsule, Take 500 mg by mouth 2 times daily (Patient not taking: Reported on 7/11/2024)  lidocaine (XYLOCAINE) 5 % ointment,   metFORMIN (GLUCOPHAGE) 500 MG tablet, Take 1 tablet by mouth 3 times daily (with meals)  metoprolol (LOPRESSOR) 50 MG tablet, Take 1 tablet by mouth 2 times daily  amLODIPine (NORVASC) 10 MG tablet, Take 1 tablet  Collected: 07/11/24 1357    Order Status: Sent Specimen: Stool     Clostridium Difficile Toxin/Antigen [[id number]] Collected: 07/11/24 1342    Order Status: Completed Specimen: Stool Updated: 07/12/24 1256     Specimen Description .FECES     C DIFF AG + TOXIN NEGATIVE     Comment: No C. difficile antigen and Toxin Detected.       Giardia antigen [[id number]] Collected: 07/11/24 1342    Order Status: Sent Specimen: Stool Updated: 07/11/24 1418    Blood Culture 1 [[id number]] Collected: 07/11/24 0823    Order Status: Completed Specimen: Blood Updated: 07/12/24 1054     Specimen Description .BLOOD     Special Requests          Culture NO GROWTH 1 DAY    Culture, Blood 2 [[id number]] Collected: 07/11/24 0823    Order Status: Completed Specimen: Blood Updated: 07/12/24 1054     Specimen Description .BLOOD     Special Requests Site: Blood     Culture NO GROWTH 1 DAY                 FINAL IMPRESSION    Patient is a 68 y.o. female who presented with   Chief Complaint   Patient presents with    Fall     Right knee pain and abrasion-fell oob at 0100 and was unable to get back in-reports generalized weakness and diarrhea for several days    and admitted for Rhabdomyolysis [M62.82]  Dehydration [E86.0]  Non-traumatic rhabdomyolysis [M62.82]  Acute renal failure, unspecified acute renal failure type (HCC) [N17.9]  Diarrhea, unspecified type [R19.7]  Leukocytosis  Fevers   transverse colon concerning for colitis had diarrhea   ?food poisoning   Rhabdomyolysis   justin    lactobacillus (CULTURELLE) capsule 1 capsule, Daily  ciprofloxacin (CIPRO) IVPB 200 mg, Q12H  metroNIDAZOLE (FLAGYL) 500 mg in 0.9% NaCl 100 mL IVPB premix, Q8H      Cont atbx   Check stools CHECK ROTAVIRUS/NORAVIRUS    Available labs, imaging studies, microbiologic studies have been reviewed.     The patient/FAMILY  was educated about the diagnosis, prognosis, indications, risks and benefits of treatment.      An opportunity to ask questions was given to the

## 2024-07-12 NOTE — PLAN OF CARE
Problem: Safety - Adult  Goal: Free from fall injury  7/12/2024 1619 by Ayan Mancilla RN  Outcome: Progressing  7/12/2024 1618 by Ayan Mancilla RN  Outcome: Progressing  7/12/2024 0449 by Vilma Mclean RN  Outcome: Progressing     Problem: Discharge Planning  Goal: Discharge to home or other facility with appropriate resources  7/12/2024 1619 by Ayan Mancilla RN  Outcome: Progressing  7/12/2024 1618 by Ayan Mancilla RN  Outcome: Progressing  7/12/2024 0449 by Vilma Mclean RN  Outcome: Progressing  Flowsheets (Taken 7/11/2024 2300)  Discharge to home or other facility with appropriate resources:   Identify barriers to discharge with patient and caregiver   Identify discharge learning needs (meds, wound care, etc)     Problem: Pain  Goal: Verbalizes/displays adequate comfort level or baseline comfort level  7/12/2024 1619 by Ayan Mancilla RN  Outcome: Progressing  7/12/2024 1618 by Ayan Mancilla RN  Outcome: Progressing  7/12/2024 0449 by Vilma Mclean RN  Outcome: Progressing     Problem: Skin/Tissue Integrity  Goal: Absence of new skin breakdown  Description: 1.  Monitor for areas of redness and/or skin breakdown  2.  Assess vascular access sites hourly  3.  Every 4-6 hours minimum:  Change oxygen saturation probe site  4.  Every 4-6 hours:  If on nasal continuous positive airway pressure, respiratory therapy assess nares and determine need for appliance change or resting period.  7/12/2024 1619 by Ayan Mancilla RN  Outcome: Progressing  7/12/2024 1618 by Ayan Mancilla RN  Outcome: Progressing  7/12/2024 0449 by Vilma Mclean RN  Outcome: Progressing

## 2024-07-12 NOTE — H&P
[address] [address]                           HISTORY & PHYSICAL      PATIENT NAME: RUFUS SHARIF               : 1955  MED REC NO: [id number]                        ROOM:   ACCOUNT NO: [id number]                       ADMIT DATE: 2024  PROVIDER: Merrill Nova DO      CHIEF COMPLAINT AND HISTORY OF CHIEF COMPLAINT:  This is a pleasant 68-year-old white female who was admitted to [facility].  The patient presented to the hospital here under the service of Dr. Simms.  The patient presented to the hospital here after what appeared to be episode of diarrhea for the past several days.  The patient had been complaining of some diffuse abdominal cramping pain.  This started approximately 2-3 days ago.  The patient states at approximately 1:20 this morning the patient wanted to get out of bed at which time she apparently fell.  The patient landed on the floor at which time she laid there until approximately 6 a.m.  She denies hitting her head or any associated injury.  The patient presented to the hospital here, was seen and evaluated.  The patient at this time did appear to have some degree of rhabdomyolysis.  A CT of the abdomen and pelvis at this time did show evidence of pancolitis.  The patient was seen and evaluated in the emergency room.  She was admitted to the hospital at this time to the general telemetry floor under the service of Dr. Nova and Dr. Armstrong.  Exam at this time revealed a pleasant 68-year-old white female.  The patient does have a history of 2 strokes in the past, one in  and another one in .  She does have some ambulatory dysfunction, does use a cane and does suffer from vertigo.  From a cardiac standpoint view, the patient does have a history of myocardial infraction in the past, but currently has no complaints of chest pain or shortness of breath.  She does have

## 2024-07-12 NOTE — PLAN OF CARE
Problem: Safety - Adult  Goal: Free from fall injury  7/12/2024 1618 by Ayan Mancilla RN  Outcome: Progressing  7/12/2024 0449 by Vilma Mclean RN  Outcome: Progressing     Problem: Discharge Planning  Goal: Discharge to home or other facility with appropriate resources  7/12/2024 1618 by Ayan Mancilla RN  Outcome: Progressing  7/12/2024 0449 by Vilma Mclean RN  Outcome: Progressing  Flowsheets (Taken 7/11/2024 2300)  Discharge to home or other facility with appropriate resources:   Identify barriers to discharge with patient and caregiver   Identify discharge learning needs (meds, wound care, etc)     Problem: Pain  Goal: Verbalizes/displays adequate comfort level or baseline comfort level  7/12/2024 1618 by Ayan Mancilla RN  Outcome: Progressing  7/12/2024 0449 by Vilma Mclean RN  Outcome: Progressing     Problem: Skin/Tissue Integrity  Goal: Absence of new skin breakdown  Description: 1.  Monitor for areas of redness and/or skin breakdown  2.  Assess vascular access sites hourly  3.  Every 4-6 hours minimum:  Change oxygen saturation probe site  4.  Every 4-6 hours:  If on nasal continuous positive airway pressure, respiratory therapy assess nares and determine need for appliance change or resting period.  7/12/2024 1618 by Ayan Mancilla RN  Outcome: Progressing  7/12/2024 0449 by Vilma Mclean RN  Outcome: Progressing

## 2024-07-12 NOTE — PROGRESS NOTES
Internal Medicine Consult Note    [facility]    Merrill Nova D.O., LEILAI.                    Davon Armstrong D.O., SHAHBAZ Burger D.O.     Lyla ARIELLEHaydee Lake, MSN, APRN, NP-C  John Reid, MSN, APRN-CNP  Adi Rashid, MSN, APRN-CNP  Mansi Naqvi, MSN APRN-CNP  Melita Rizvi, MSN. APRN-NP-C     Primary Care Physician: Sukumar Simms Jr., DO   Admitting Physician:  Merrill Nova DO  Admission date and time: 7/11/2024  7:43 AM    Room:  [address] [place] [address]  Admitting diagnosis: Rhabdomyolysis [M62.82]  Dehydration [E86.0]  Non-traumatic rhabdomyolysis [M62.82]  Acute renal failure, unspecified acute renal failure type (HCC) [N17.9]  Diarrhea, unspecified type [R19.7]    Patient Name: Lyla Ruiz  MRN: [id number]    Date of Service: 7/12/2024     Subjective:  Lyla is a 68 y.o. female who was seen and examined today,7/12/2024, at the bedside.   Patient is resting comfortably.  She is currently on 4 L oxygen by nasal cannula.  She denies any pain or discomfort but states she continues to have diarrhea.  Stool cultures for Giardia and clostridial difficile have been sent and pending    No family present during my examination.    Review of System:   Constitutional:   Denies fever or chills, weight loss or gain, positive fatigue and general malaise   HEENT:   Denies ear pain, sore throat, sinus or eye problems.  Cardiovascular:   Denies any chest pain, irregular heartbeats, or palpitations.   Respiratory:   Denies shortness of breath, coughing, sputum production, hemoptysis, or wheezing.  Gastrointestinal:   Denies nausea, vomiting, or constipation.  Denies any abdominal pain.  Positive diarrhea  Genitourinary:    Denies any urgency, frequency, hematuria. Voiding  without difficulty.  Extremities:   Denies lower extremity swelling, edema or cyanosis.   Neurology:    Denies any headache or focal neurological deficits, Denies generalized weakness or

## 2024-07-12 NOTE — PLAN OF CARE
Problem: Safety - Adult  Goal: Free from fall injury  Outcome: Progressing     Problem: Discharge Planning  Goal: Discharge to home or other facility with appropriate resources  Outcome: Progressing  Flowsheets (Taken 7/11/2024 2300)  Discharge to home or other facility with appropriate resources:   Identify barriers to discharge with patient and caregiver   Identify discharge learning needs (meds, wound care, etc)     Problem: Pain  Goal: Verbalizes/displays adequate comfort level or baseline comfort level  Outcome: Progressing     Problem: Skin/Tissue Integrity  Goal: Absence of new skin breakdown  Description: 1.  Monitor for areas of redness and/or skin breakdown  2.  Assess vascular access sites hourly  3.  Every 4-6 hours minimum:  Change oxygen saturation probe site  4.  Every 4-6 hours:  If on nasal continuous positive airway pressure, respiratory therapy assess nares and determine need for appliance change or resting period.  Outcome: Progressing

## 2024-07-13 LAB
ALBUMIN SERPL-MCNC: 2.9 G/DL (ref 3.5–5.2)
ALP SERPL-CCNC: 61 U/L (ref 35–104)
ALT SERPL-CCNC: 18 U/L (ref 0–32)
ANION GAP SERPL CALCULATED.3IONS-SCNC: 13 MMOL/L (ref 7–16)
AST SERPL-CCNC: 28 U/L (ref 0–31)
BASOPHILS # BLD: 0.12 K/UL (ref 0–0.2)
BASOPHILS NFR BLD: 1 % (ref 0–2)
BILIRUB SERPL-MCNC: 0.4 MG/DL (ref 0–1.2)
BUN SERPL-MCNC: 15 MG/DL (ref 6–23)
CALCIUM SERPL-MCNC: 8.1 MG/DL (ref 8.6–10.2)
CHLORIDE SERPL-SCNC: 108 MMOL/L (ref 98–107)
CK SERPL-CCNC: 383 U/L (ref 20–180)
CO2 SERPL-SCNC: 17 MMOL/L (ref 22–29)
CREAT SERPL-MCNC: 1.1 MG/DL (ref 0.5–1)
EKG ATRIAL RATE: 87 BPM
EKG P AXIS: 59 DEGREES
EKG P-R INTERVAL: 184 MS
EKG Q-T INTERVAL: 352 MS
EKG QRS DURATION: 86 MS
EKG QTC CALCULATION (BAZETT): 423 MS
EKG R AXIS: -41 DEGREES
EKG T AXIS: 60 DEGREES
EKG VENTRICULAR RATE: 87 BPM
EOSINOPHIL # BLD: 0.36 K/UL (ref 0.05–0.5)
EOSINOPHILS RELATIVE PERCENT: 3 % (ref 0–6)
ERYTHROCYTE [DISTWIDTH] IN BLOOD BY AUTOMATED COUNT: 13.7 % (ref 11.5–15)
GFR, ESTIMATED: 57 ML/MIN/1.73M2
GLUCOSE SERPL-MCNC: 161 MG/DL (ref 74–99)
HCT VFR BLD AUTO: 33.9 % (ref 34–48)
HGB BLD-MCNC: 11 G/DL (ref 11.5–15.5)
LYMPHOCYTES NFR BLD: 0.95 K/UL (ref 1.5–4)
LYMPHOCYTES RELATIVE PERCENT: 8 % (ref 20–42)
MCH RBC QN AUTO: 30.3 PG (ref 26–35)
MCHC RBC AUTO-ENTMCNC: 32.4 G/DL (ref 32–34.5)
MCV RBC AUTO: 93.4 FL (ref 80–99.9)
MONOCYTES NFR BLD: 18 % (ref 2–12)
MONOCYTES NFR BLD: 2.14 K/UL (ref 0.1–0.95)
NEUTROPHILS NFR BLD: 70 % (ref 43–80)
NEUTS SEG NFR BLD: 8.33 K/UL (ref 1.8–7.3)
PLATELET CONFIRMATION: NORMAL
PLATELET, FLUORESCENCE: 204 K/UL (ref 130–450)
PMV BLD AUTO: 10.5 FL (ref 7–12)
POTASSIUM SERPL-SCNC: 4.8 MMOL/L (ref 3.5–5)
PROT SERPL-MCNC: 5.8 G/DL (ref 6.4–8.3)
RBC # BLD AUTO: 3.63 M/UL (ref 3.5–5.5)
SODIUM SERPL-SCNC: 138 MMOL/L (ref 132–146)
WBC OTHER # BLD: 11.9 K/UL (ref 4.5–11.5)

## 2024-07-13 PROCEDURE — 2700000000 HC OXYGEN THERAPY PER DAY

## 2024-07-13 PROCEDURE — 2060000000 HC ICU INTERMEDIATE R&B

## 2024-07-13 PROCEDURE — 6360000002 HC RX W HCPCS

## 2024-07-13 PROCEDURE — 36415 COLL VENOUS BLD VENIPUNCTURE: CPT

## 2024-07-13 PROCEDURE — 85025 COMPLETE CBC W/AUTO DIFF WBC: CPT

## 2024-07-13 PROCEDURE — 80053 COMPREHEN METABOLIC PANEL: CPT

## 2024-07-13 PROCEDURE — 6370000000 HC RX 637 (ALT 250 FOR IP)

## 2024-07-13 PROCEDURE — 2580000003 HC RX 258

## 2024-07-13 PROCEDURE — 82550 ASSAY OF CK (CPK): CPT

## 2024-07-13 RX ORDER — ENOXAPARIN SODIUM 100 MG/ML
30 INJECTION SUBCUTANEOUS 2 TIMES DAILY
Status: DISCONTINUED | OUTPATIENT
Start: 2024-07-13 | End: 2024-07-16 | Stop reason: HOSPADM

## 2024-07-13 RX ORDER — CYANOCOBALAMIN 1000 UG/ML
1000 INJECTION, SOLUTION INTRAMUSCULAR; SUBCUTANEOUS DAILY
Status: DISCONTINUED | OUTPATIENT
Start: 2024-07-13 | End: 2024-07-16 | Stop reason: HOSPADM

## 2024-07-13 RX ADMIN — AMLODIPINE BESYLATE 10 MG: 5 TABLET ORAL at 08:15

## 2024-07-13 RX ADMIN — METRONIDAZOLE 500 MG: 500 INJECTION, SOLUTION INTRAVENOUS at 23:46

## 2024-07-13 RX ADMIN — METOPROLOL TARTRATE 50 MG: 50 TABLET, FILM COATED ORAL at 08:15

## 2024-07-13 RX ADMIN — Medication 1 CAPSULE: at 08:15

## 2024-07-13 RX ADMIN — SODIUM CHLORIDE: 9 INJECTION, SOLUTION INTRAVENOUS at 04:44

## 2024-07-13 RX ADMIN — PANTOPRAZOLE SODIUM 40 MG: 40 TABLET, DELAYED RELEASE ORAL at 08:15

## 2024-07-13 RX ADMIN — ISOSORBIDE MONONITRATE 30 MG: 30 TABLET, EXTENDED RELEASE ORAL at 08:15

## 2024-07-13 RX ADMIN — ASPIRIN 325 MG: 325 TABLET, COATED ORAL at 08:15

## 2024-07-13 RX ADMIN — CIPROFLOXACIN 200 MG: 200 INJECTION, SOLUTION INTRAVENOUS at 04:42

## 2024-07-13 RX ADMIN — CYANOCOBALAMIN 1000 MCG: 1000 INJECTION, SOLUTION INTRAMUSCULAR; SUBCUTANEOUS at 12:56

## 2024-07-13 RX ADMIN — LISINOPRIL 20 MG: 20 TABLET ORAL at 08:15

## 2024-07-13 RX ADMIN — CIPROFLOXACIN 200 MG: 200 INJECTION, SOLUTION INTRAVENOUS at 15:07

## 2024-07-13 RX ADMIN — METOPROLOL TARTRATE 50 MG: 50 TABLET, FILM COATED ORAL at 20:51

## 2024-07-13 RX ADMIN — ENOXAPARIN SODIUM 30 MG: 100 INJECTION SUBCUTANEOUS at 20:51

## 2024-07-13 RX ADMIN — ATORVASTATIN CALCIUM 20 MG: 20 TABLET, FILM COATED ORAL at 08:15

## 2024-07-13 RX ADMIN — METRONIDAZOLE 500 MG: 500 INJECTION, SOLUTION INTRAVENOUS at 07:32

## 2024-07-13 RX ADMIN — METRONIDAZOLE 500 MG: 500 INJECTION, SOLUTION INTRAVENOUS at 15:04

## 2024-07-13 NOTE — PROGRESS NOTES
Pharmacist Review and Automatic Dose Adjustment of Prophylactic Enoxaparin    Reviewed reason(s) for admission/hospital problem list    The reviewing pharmacist has made an adjustment to the ordered enoxaparin dose or converted to UFH per the approved [facility] protocol and table as identified below.        Lyla Ruiz is a 68 y.o. female.     Recent Labs     07/11/24  0823 07/12/24  0502 07/13/24  0738   CREATININE 2.6* 1.8* 1.1*       Estimated Creatinine Clearance: 61 mL/min (A) (based on SCr of 1.1 mg/dL (H)).    Recent Labs     07/12/24  0502 07/13/24  0738   HGB 11.0* 11.0*   HCT 34.2 33.9*     --      No results for input(s): \"INR\" in the last 72 hours.    Height:   Ht Readings from Last 1 Encounters:   07/11/24 1.549 m (5' 1\")     Weight:  Wt Readings from Last 1 Encounters:   07/11/24 119.3 kg (263 lb)               Plan: Based upon the patient's weight and renal function    Ordered: Enoxaparin 30mg SUBQ Daily    Changed/converted to    New Order: Enoxaparin 30mg SUBQ BID      Thank you,  Leanna Goodman, [facility]  7/13/2024, 1:56 PM

## 2024-07-13 NOTE — PROGRESS NOTES
Internal Medicine Consult Note    [facility]    Merrill Nova D.O., SHAHBAZ Armstrong D.O., SHAHBAZ Burger D.O.     Lyla ARIELLEHaydee Lake, MSN, APRN, NP-C  John Reid, MSN, APRN-CNP  Adi Rashid, MSN, APRN-CNP  Mansi Naqvi, MSN APRN-CNP  Melita Rizvi, MSN. APRN-NP-C     Primary Care Physician: Sukumar Simms Jr., DO   Admitting Physician:  Merrill Nova DO  Admission date and time: 7/11/2024  7:43 AM    Room:  [address] [place] [address]  Admitting diagnosis: Rhabdomyolysis [M62.82]  Dehydration [E86.0]  Non-traumatic rhabdomyolysis [M62.82]  Acute renal failure, unspecified acute renal failure type (HCC) [N17.9]  Diarrhea, unspecified type [R19.7]    Patient Name: Lyla Ruiz  MRN: [id number]    Date of Service: 7/13/2024     Subjective:  Lyla is a 68 y.o. female who was seen and examined today,7/13/2024, at the bedside.   Patient is resting comfortably.  She is currently on 4 L oxygen by nasal cannula.  She is tired diarrhea is getting better and her CPK levels are trending downwards.  Her Giardia and her C. difficile both returned negative  No family present during my examination.    Review of System:   Constitutional:   Denies fever or chills, weight loss or gain, positive fatigue and general malaise   HEENT:   Denies ear pain, sore throat, sinus or eye problems.  Cardiovascular:   Denies any chest pain, irregular heartbeats, or palpitations.   Respiratory:   Denies shortness of breath, coughing, sputum production, hemoptysis, or wheezing.  Gastrointestinal:   Denies nausea, vomiting, or constipation.  Denies any abdominal pain.  Improving diarrhea  Genitourinary:    Denies any urgency, frequency, hematuria. Voiding  without difficulty.  Extremities:   Denies lower extremity swelling, edema or cyanosis.   Neurology:    Denies any headache or focal neurological deficits, Denies generalized weakness or memory difficulty.  ciprofloxacin  200 mg IntraVENous Q12H    metroNIDAZOLE  500 mg IntraVENous Q8H    amLODIPine  10 mg Oral Daily    aspirin  325 mg Oral Daily    atorvastatin  20 mg Oral Daily    isosorbide mononitrate  30 mg Oral Daily    lisinopril  20 mg Oral Daily    metoprolol tartrate  50 mg Oral BID     Continuous Infusions:   sodium chloride 50 mL/hr at 07/13/24 1143       Objective Data:  Recent Labs     07/11/24  0823 07/12/24  0502 07/13/24  0738   WBC 12.6* 11.0 11.9*   RBC 4.84 3.61 3.63   HGB 14.2 11.0* 11.0*   HCT 45.0 34.2 33.9*   MCV 93.0 94.7 93.4   MCH 29.3 30.5 30.3   MCHC 31.6* 32.2 32.4   RDW 13.9 13.9 13.7   PLT  --  226  --    MPV 9.5 9.4 10.5       Recent Labs     07/11/24  0823 07/12/24  0502 07/13/24  0738    136 138   K 4.2 4.0 4.8   CL 99 102 108*   CO2 20* 21* 17*   BUN 28* 27* 15   CREATININE 2.6* 1.8* 1.1*   GLUCOSE 166* 151* 161*   CALCIUM 9.2 7.7* 8.1*   BILITOT 0.5 0.3 0.4   ALKPHOS 58 57 61   AST 49* 33* 28   ALT 26 20 18   ALBUMIN 4.2 3.3* 2.9*       No results found for: \"TROPONINI\"     Assessment:  1. Diarrhea with pancolitis, rule out infectious etiology.  2. Status post mechanical fall with associated rhabdomyolysis.  3. History of coronary artery disease with elevated troponin suggesting demand ischemia.  4. Obesity with elevated BMI of 45.69.  5. Essential hypertension.  6. 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  7. Type 2 diabetes mellitus.  8. Gastroesophageal reflux disease.  9. Hyperlipidemia, on Lipitor.    Plan:   Stool cultures pending  IV fluids for hydration  Giardia and C. difficile negative  Antibiotic therapy with Cipro and Flagyl  PPI with Protonix  Lactobacillus  DVT prophylaxis  Infectious disease following    Continue current therapy.  See orders for further plan of care.    More than 50% of my  time was spent at the bedside counseling/coordinating care with the patient and/or family with face to face contact.  This time was spent

## 2024-07-14 LAB
ALBUMIN SERPL-MCNC: 3 G/DL (ref 3.5–5.2)
ALP SERPL-CCNC: 57 U/L (ref 35–104)
ALT SERPL-CCNC: 15 U/L (ref 0–32)
ANION GAP SERPL CALCULATED.3IONS-SCNC: 12 MMOL/L (ref 7–16)
ANION GAP SERPL CALCULATED.3IONS-SCNC: 12 MMOL/L (ref 7–16)
AST SERPL-CCNC: 24 U/L (ref 0–31)
BASOPHILS # BLD: 0 K/UL (ref 0–0.2)
BASOPHILS NFR BLD: 0 % (ref 0–2)
BILIRUB SERPL-MCNC: 0.4 MG/DL (ref 0–1.2)
BUN SERPL-MCNC: 6 MG/DL (ref 6–23)
BUN SERPL-MCNC: 9 MG/DL (ref 6–23)
CALCIUM SERPL-MCNC: 7.6 MG/DL (ref 8.6–10.2)
CALCIUM SERPL-MCNC: 8.1 MG/DL (ref 8.6–10.2)
CHLORIDE SERPL-SCNC: 105 MMOL/L (ref 98–107)
CHLORIDE SERPL-SCNC: 107 MMOL/L (ref 98–107)
CK SERPL-CCNC: 225 U/L (ref 20–180)
CO2 SERPL-SCNC: 15 MMOL/L (ref 22–29)
CO2 SERPL-SCNC: 17 MMOL/L (ref 22–29)
CREAT SERPL-MCNC: 0.9 MG/DL (ref 0.5–1)
CREAT SERPL-MCNC: 1 MG/DL (ref 0.5–1)
EOSINOPHIL # BLD: 0.35 K/UL (ref 0.05–0.5)
EOSINOPHILS RELATIVE PERCENT: 3 % (ref 0–6)
ERYTHROCYTE [DISTWIDTH] IN BLOOD BY AUTOMATED COUNT: 13.7 % (ref 11.5–15)
GFR, ESTIMATED: 66 ML/MIN/1.73M2
GFR, ESTIMATED: 73 ML/MIN/1.73M2
GLUCOSE SERPL-MCNC: 133 MG/DL (ref 74–99)
GLUCOSE SERPL-MCNC: 231 MG/DL (ref 74–99)
HCT VFR BLD AUTO: 34.6 % (ref 34–48)
HGB BLD-MCNC: 11.1 G/DL (ref 11.5–15.5)
LYMPHOCYTES NFR BLD: 2.55 K/UL (ref 1.5–4)
LYMPHOCYTES RELATIVE PERCENT: 22 % (ref 20–42)
MCH RBC QN AUTO: 29.6 PG (ref 26–35)
MCHC RBC AUTO-ENTMCNC: 32.1 G/DL (ref 32–34.5)
MCV RBC AUTO: 92.3 FL (ref 80–99.9)
MONOCYTES NFR BLD: 18 % (ref 2–12)
MONOCYTES NFR BLD: 2.09 K/UL (ref 0.1–0.95)
NEUTROPHILS NFR BLD: 57 % (ref 43–80)
NEUTS SEG NFR BLD: 6.61 K/UL (ref 1.8–7.3)
PLATELET # BLD AUTO: 172 K/UL (ref 130–450)
PLATELET CONFIRMATION: NORMAL
PLATELET ESTIMATE: ABNORMAL
PMV BLD AUTO: 10.3 FL (ref 7–12)
POTASSIUM SERPL-SCNC: 4 MMOL/L (ref 3.5–5)
POTASSIUM SERPL-SCNC: 6.2 MMOL/L (ref 3.5–5)
PROT SERPL-MCNC: 6 G/DL (ref 6.4–8.3)
RBC # BLD AUTO: 3.75 M/UL (ref 3.5–5.5)
RBC # BLD: ABNORMAL 10*6/UL
SODIUM SERPL-SCNC: 134 MMOL/L (ref 132–146)
SODIUM SERPL-SCNC: 134 MMOL/L (ref 132–146)
WBC OTHER # BLD: 11.6 K/UL (ref 4.5–11.5)

## 2024-07-14 PROCEDURE — 97530 THERAPEUTIC ACTIVITIES: CPT | Performed by: PHYSICAL THERAPIST

## 2024-07-14 PROCEDURE — 80053 COMPREHEN METABOLIC PANEL: CPT

## 2024-07-14 PROCEDURE — 6370000000 HC RX 637 (ALT 250 FOR IP)

## 2024-07-14 PROCEDURE — 97161 PT EVAL LOW COMPLEX 20 MIN: CPT | Performed by: PHYSICAL THERAPIST

## 2024-07-14 PROCEDURE — 2580000003 HC RX 258

## 2024-07-14 PROCEDURE — 80048 BASIC METABOLIC PNL TOTAL CA: CPT

## 2024-07-14 PROCEDURE — 85025 COMPLETE CBC W/AUTO DIFF WBC: CPT

## 2024-07-14 PROCEDURE — 6360000002 HC RX W HCPCS

## 2024-07-14 PROCEDURE — 82550 ASSAY OF CK (CPK): CPT

## 2024-07-14 PROCEDURE — 2700000000 HC OXYGEN THERAPY PER DAY

## 2024-07-14 PROCEDURE — 6370000000 HC RX 637 (ALT 250 FOR IP): Performed by: SPECIALIST

## 2024-07-14 PROCEDURE — 2060000000 HC ICU INTERMEDIATE R&B

## 2024-07-14 PROCEDURE — 36415 COLL VENOUS BLD VENIPUNCTURE: CPT

## 2024-07-14 RX ORDER — HYDRALAZINE HYDROCHLORIDE 25 MG/1
25 TABLET, FILM COATED ORAL EVERY 8 HOURS SCHEDULED
Status: DISCONTINUED | OUTPATIENT
Start: 2024-07-14 | End: 2024-07-16 | Stop reason: HOSPADM

## 2024-07-14 RX ORDER — METRONIDAZOLE 500 MG/1
500 TABLET ORAL EVERY 8 HOURS SCHEDULED
Status: DISCONTINUED | OUTPATIENT
Start: 2024-07-14 | End: 2024-07-16 | Stop reason: HOSPADM

## 2024-07-14 RX ORDER — SODIUM BICARBONATE 650 MG/1
650 TABLET ORAL 2 TIMES DAILY
Status: DISCONTINUED | OUTPATIENT
Start: 2024-07-14 | End: 2024-07-16 | Stop reason: HOSPADM

## 2024-07-14 RX ORDER — CIPROFLOXACIN 500 MG/1
500 TABLET, FILM COATED ORAL EVERY 12 HOURS SCHEDULED
Status: DISCONTINUED | OUTPATIENT
Start: 2024-07-14 | End: 2024-07-16 | Stop reason: HOSPADM

## 2024-07-14 RX ADMIN — ISOSORBIDE MONONITRATE 30 MG: 30 TABLET, EXTENDED RELEASE ORAL at 08:56

## 2024-07-14 RX ADMIN — CYANOCOBALAMIN 1000 MCG: 1000 INJECTION, SOLUTION INTRAMUSCULAR; SUBCUTANEOUS at 08:56

## 2024-07-14 RX ADMIN — AMLODIPINE BESYLATE 10 MG: 5 TABLET ORAL at 08:56

## 2024-07-14 RX ADMIN — LISINOPRIL 20 MG: 20 TABLET ORAL at 08:56

## 2024-07-14 RX ADMIN — SODIUM ZIRCONIUM CYCLOSILICATE 10 G: 10 POWDER, FOR SUSPENSION ORAL at 09:38

## 2024-07-14 RX ADMIN — SODIUM BICARBONATE 650 MG: 650 TABLET ORAL at 22:05

## 2024-07-14 RX ADMIN — PANTOPRAZOLE SODIUM 40 MG: 40 TABLET, DELAYED RELEASE ORAL at 07:56

## 2024-07-14 RX ADMIN — Medication 1 CAPSULE: at 08:56

## 2024-07-14 RX ADMIN — CIPROFLOXACIN HYDROCHLORIDE 500 MG: 500 TABLET, FILM COATED ORAL at 09:38

## 2024-07-14 RX ADMIN — CIPROFLOXACIN 200 MG: 200 INJECTION, SOLUTION INTRAVENOUS at 04:03

## 2024-07-14 RX ADMIN — CIPROFLOXACIN HYDROCHLORIDE 500 MG: 500 TABLET, FILM COATED ORAL at 22:05

## 2024-07-14 RX ADMIN — METOPROLOL TARTRATE 50 MG: 50 TABLET, FILM COATED ORAL at 08:56

## 2024-07-14 RX ADMIN — ENOXAPARIN SODIUM 30 MG: 100 INJECTION SUBCUTANEOUS at 08:56

## 2024-07-14 RX ADMIN — SODIUM CHLORIDE: 9 INJECTION, SOLUTION INTRAVENOUS at 14:11

## 2024-07-14 RX ADMIN — SODIUM BICARBONATE 650 MG: 650 TABLET ORAL at 09:38

## 2024-07-14 RX ADMIN — ASPIRIN 325 MG: 325 TABLET, COATED ORAL at 08:56

## 2024-07-14 RX ADMIN — HYDRALAZINE HYDROCHLORIDE 25 MG: 25 TABLET ORAL at 14:14

## 2024-07-14 RX ADMIN — METRONIDAZOLE 500 MG: 500 TABLET ORAL at 22:05

## 2024-07-14 RX ADMIN — ENOXAPARIN SODIUM 30 MG: 100 INJECTION SUBCUTANEOUS at 22:07

## 2024-07-14 RX ADMIN — HYDRALAZINE HYDROCHLORIDE 25 MG: 25 TABLET ORAL at 22:05

## 2024-07-14 RX ADMIN — ATORVASTATIN CALCIUM 20 MG: 20 TABLET, FILM COATED ORAL at 08:56

## 2024-07-14 RX ADMIN — METOPROLOL TARTRATE 50 MG: 50 TABLET, FILM COATED ORAL at 22:05

## 2024-07-14 RX ADMIN — METRONIDAZOLE 500 MG: 500 INJECTION, SOLUTION INTRAVENOUS at 07:58

## 2024-07-14 RX ADMIN — METRONIDAZOLE 500 MG: 500 TABLET ORAL at 14:14

## 2024-07-14 NOTE — PROGRESS NOTES
NAME: Lyla Ruiz  MR:  [id number]  :   1955  Admit Date:  2024    Elements of this note, were copied and pasted from Previous. Updates have been made where noted and reflect current exam and medical decision making from the DOS of this encounter.  CHIEF COMPLAINT       Chief Complaint   Patient presents with    Fall     Right knee pain and abrasion-fell oob at 0100 and was unable to get back in-reports generalized weakness and diarrhea for several days     HISTORY OF PRESENT ILLNESS     Lyla Ruiz is a 68 y.o. female admitted on 2024 and has  has a past medical history of Allergic rhinitis, Cerebrovascular disease, Cerebrovascular disease, Diabetes (HCC), Hyperlipidemia, Hypertension, Irregular heart beat, Myocardial infarct (HCC), Neuropathy, Obesity, Sleep apnea, Thyroid disease, and Weakness due to cerebrovascular accident (CVA).     This is a face to face encounter   24 no f/c had diarrhea no n/v/ did not eat mushroom    Patient is tolerating medications. No reported adverse drug reactions.  Available labs, imaging studies, microbiologic studies have been reviewed with pt and family if present.  Assessment & Plan     Admitted for   Rhabdomyolysis [M62.82]  Dehydration [E86.0]  Non-traumatic rhabdomyolysis [M62.82]  Acute renal failure, unspecified acute renal failure type (HCC) [N17.9]  Diarrhea, unspecified type [R19.7]  ID following for   Leukocytosis wbc11.9  Fevers   transverse colon concerning for colitis had diarrhea   ?food poisoning   Rhabdomyolysis   Denis better cr1.1      lactobacillus (CULTURELLE) capsule 1 capsule, Daily  ciprofloxacin (CIPRO) IVPB 200 mg, Q12H  metroNIDAZOLE (FLAGYL) 500 mg in 0.9% NaCl 100 mL IVPB premix, Q8H        Cont atbx   Check stools CHECK ROTAVIRUS-neg/NORAVIRUS    Cdiff/giardia/neg     DISPOSITION:  REVIEW OF SYSTEMS     As stated above      PHYSICAL EXAMINATION    /63   Pulse 76   Temp 98.1 °F (36.7 °C) (Infrared)   Resp 15   Ht

## 2024-07-14 NOTE — PROGRESS NOTES
Internal Medicine Consult Note    [facility]    Merrill Nova D.O., SHAHBAZ Armstrong D.O., SHAHBAZ Burger D.O.     Lyla KAMALA Lake, MSN, APRN, NP-C  John Reid, MSN, APRN-CNP  Adi Rashid, MSN, APRN-CNP  Mansi Naqvi, MSN APRN-CNP  Melita Rizvi, MSN. APRN-NP-C     Primary Care Physician: Sukumar Simms Jr., DO   Admitting Physician:  Merrill Nova DO  Admission date and time: 7/11/2024  7:43 AM    Room:  [address] [place] [address]  Admitting diagnosis: Rhabdomyolysis [M62.82]  Dehydration [E86.0]  Non-traumatic rhabdomyolysis [M62.82]  Acute renal failure, unspecified acute renal failure type (HCC) [N17.9]  Diarrhea, unspecified type [R19.7]    Patient Name: Lyla Ruiz  MRN: [id number]    Date of Service: 7/14/2024     Subjective:  Llya is a 68 y.o. female who was seen and examined today,7/14/2024, at the bedside.   Patient is resting comfortably.  She is currently on 4 L oxygen by nasal cannula.  Overall she looks much improved.  She does have an elevated potassium this morning patient states this has been an ongoing problem with her will give a dose of Lokelma.  Will discontinue her lisinopril and start hydralazine. no family present during my examination.    Review of System:   Constitutional:   Denies fever or chills, weight loss or gain, positive fatigue and general malaise   HEENT:   Denies ear pain, sore throat, sinus or eye problems.  Cardiovascular:   Denies any chest pain, irregular heartbeats, or palpitations.   Respiratory:   Denies shortness of breath, coughing, sputum production, hemoptysis, or wheezing.  Gastrointestinal:   Denies nausea, vomiting, or constipation.  Denies any abdominal pain.  Improving diarrhea  Genitourinary:    Denies any urgency, frequency, hematuria. Voiding  without difficulty.  Extremities:   Denies lower extremity swelling, edema or cyanosis.   Neurology:    Denies any headache

## 2024-07-14 NOTE — PROGRESS NOTES
Physical Therapy    Physical Therapy Initial Evaluation/Plan of Care    Room #:  0513/0513-01  Patient Name: Lyla Ruiz  YOB: 1955  MRN: [id number]    Date of Service: 7/14/2024     Tentative placement recommendation: Subacute Rehab vs home if able to ambulate and manage o2 line  Equipment recommendation: Wheeled Walker  if needed at d/c    Evaluating Physical Therapist: Boni Joseph, PT  #[id number]      Specific Provider Orders/Date/Referring Provider :     PT eval and treat  Start:  07/14/24 0930,   End:  07/14/24 0930,   ONE TIME,   Standing Count:  1 Occurrences,   R       Merrill Nova DO    Admitting Diagnosis:   Rhabdomyolysis [M62.82]  Dehydration [E86.0]  Non-traumatic rhabdomyolysis [M62.82]  Acute renal failure, unspecified acute renal failure type (HCC) [N17.9]  Diarrhea, unspecified type [R19.7]    Admitted with    slide out of bed d/t weakness  Surgery: none  Visit Diagnoses         Codes    Acute renal failure, unspecified acute renal failure type (HCC)    -  Primary N17.9    Dehydration     E86.0    Diarrhea, unspecified type     R19.7            Patient Active Problem List   Diagnosis    Leukocytosis    Acute cholecystitis    HLD (hyperlipidemia)    TIM (obstructive sleep apnea)    Type 2 diabetes mellitus, without long-term current use of insulin (HCC)    Morbid obesity with BMI of 40.0-44.9, adult (HCC)    Primary hypertension    Hyperthyroidism    Lactic acid acidosis    Renal mass    Hepatomegaly    Pleural effusion on right    Rhabdomyolysis        ASSESSMENT of Current Deficits Patient exhibits decreased strength, balance, endurance, and coordination impairing functional mobility, transfers, gait , gait distance, and tolerance to activity are barriers to d/c and require skilled intervention to address concerns listed above to increase safety and independence at discharge.   Decreased strength, balance and endurance  increases patient's risk for fall.   Limited standing

## 2024-07-14 NOTE — PROGRESS NOTES
NAME: Lyla Ruiz  MR:  [id number]  :   1955  Admit Date:  2024    Elements of this note, were copied and pasted from Previous. Updates have been made where noted and reflect current exam and medical decision making from the DOS of this encounter.  CHIEF COMPLAINT       Chief Complaint   Patient presents with    Fall     Right knee pain and abrasion-fell oob at 0100 and was unable to get back in-reports generalized weakness and diarrhea for several days     HISTORY OF PRESENT ILLNESS     Lyla Ruiz is a 68 y.o. female admitted on 2024 and has  has a past medical history of Allergic rhinitis, Cerebrovascular disease, Cerebrovascular disease, Diabetes (HCC), Hyperlipidemia, Hypertension, Irregular heart beat, Myocardial infarct (HCC), Neuropathy, Obesity, Sleep apnea, Thyroid disease, and Weakness due to cerebrovascular accident (CVA).     This is a face to face encounter   24 did not like her breakfast no c/o f/c/n/v/ stools beter  cr1  wbc11.6    no f/c had diarrhea no n/v/ did not eat mushroom    Patient is tolerating medications. No reported adverse drug reactions.  Available labs, imaging studies, microbiologic studies have been reviewed with pt and family if present.  Assessment & Plan     Admitted for   Rhabdomyolysis [M62.82]  Dehydration [E86.0]  Non-traumatic rhabdomyolysis [M62.82]  Acute renal failure, unspecified acute renal failure type (HCC) [N17.9]  Diarrhea, unspecified type [R19.7]  ID following for   Leukocytosis wbc11.9  Fevers   transverse colon concerning for colitis had diarrhea   ?food poisoning   Rhabdomyolysis better  Denis better       lactobacillus (CULTURELLE) capsule 1 capsule, Daily  ciprofloxacin (CIPRO) IVPB 200 mg, Q12H  metroNIDAZOLE (FLAGYL) 500 mg in 0.9% NaCl 100 mL IVPB premix, Q8H        Cont atbx   Check stools CHECK ROTAVIRUS-neg/NORAVIRUS    Cdiff/giardia/neg   Blood cx neg     DISPOSITION:  REVIEW OF SYSTEMS     As stated above

## 2024-07-14 NOTE — PLAN OF CARE
Problem: Safety - Adult  Goal: Free from fall injury  Outcome: Progressing  Flowsheets (Taken 7/14/2024 1146)  Free From Fall Injury: Instruct family/caregiver on patient safety     Problem: Discharge Planning  Goal: Discharge to home or other facility with appropriate resources  Outcome: Progressing  Flowsheets (Taken 7/14/2024 0900)  Discharge to home or other facility with appropriate resources: Identify barriers to discharge with patient and caregiver     Problem: Pain  Goal: Verbalizes/displays adequate comfort level or baseline comfort level  Outcome: Progressing  Flowsheets (Taken 7/14/2024 0900)  Verbalizes/displays adequate comfort level or baseline comfort level: Encourage patient to monitor pain and request assistance     Problem: Skin/Tissue Integrity  Goal: Absence of new skin breakdown  Description: 1.  Monitor for areas of redness and/or skin breakdown  2.  Assess vascular access sites hourly  3.  Every 4-6 hours minimum:  Change oxygen saturation probe site  4.  Every 4-6 hours:  If on nasal continuous positive airway pressure, respiratory therapy assess nares and determine need for appliance change or resting period.  Outcome: Progressing     Problem: ABCDS Injury Assessment  Goal: Absence of physical injury  Outcome: Progressing

## 2024-07-15 ENCOUNTER — APPOINTMENT (OUTPATIENT)
Dept: MRI IMAGING | Age: 69
End: 2024-07-15
Payer: MEDICARE

## 2024-07-15 LAB
ALBUMIN SERPL-MCNC: 3 G/DL (ref 3.5–5.2)
ALP SERPL-CCNC: 55 U/L (ref 35–104)
ALT SERPL-CCNC: 14 U/L (ref 0–32)
ANION GAP SERPL CALCULATED.3IONS-SCNC: 12 MMOL/L (ref 7–16)
AST SERPL-CCNC: 16 U/L (ref 0–31)
BASOPHILS # BLD: 0.13 K/UL (ref 0–0.2)
BASOPHILS NFR BLD: 1 % (ref 0–2)
BILIRUB SERPL-MCNC: 0.4 MG/DL (ref 0–1.2)
BUN SERPL-MCNC: 5 MG/DL (ref 6–23)
CALCIUM SERPL-MCNC: 8 MG/DL (ref 8.6–10.2)
CHLORIDE SERPL-SCNC: 109 MMOL/L (ref 98–107)
CO2 SERPL-SCNC: 17 MMOL/L (ref 22–29)
CREAT SERPL-MCNC: 0.8 MG/DL (ref 0.5–1)
EOSINOPHIL # BLD: 0.38 K/UL (ref 0.05–0.5)
EOSINOPHILS RELATIVE PERCENT: 3 % (ref 0–6)
ERYTHROCYTE [DISTWIDTH] IN BLOOD BY AUTOMATED COUNT: 13.8 % (ref 11.5–15)
GFR, ESTIMATED: 85 ML/MIN/1.73M2
GLUCOSE SERPL-MCNC: 151 MG/DL (ref 74–99)
HCT VFR BLD AUTO: 35.3 % (ref 34–48)
HGB BLD-MCNC: 11.5 G/DL (ref 11.5–15.5)
LYMPHOCYTES NFR BLD: 2 K/UL (ref 1.5–4)
LYMPHOCYTES RELATIVE PERCENT: 16 % (ref 20–42)
MCH RBC QN AUTO: 29 PG (ref 26–35)
MCHC RBC AUTO-ENTMCNC: 32.6 G/DL (ref 32–34.5)
MCV RBC AUTO: 89.1 FL (ref 80–99.9)
MONOCYTES NFR BLD: 1.88 K/UL (ref 0.1–0.95)
MONOCYTES NFR BLD: 15 % (ref 2–12)
NEUTROPHILS NFR BLD: 65 % (ref 43–80)
NEUTS SEG NFR BLD: 8.13 K/UL (ref 1.8–7.3)
PLATELET, FLUORESCENCE: 277 K/UL (ref 130–450)
PMV BLD AUTO: 10.2 FL (ref 7–12)
POTASSIUM SERPL-SCNC: 4.6 MMOL/L (ref 3.5–5)
PROT SERPL-MCNC: 6 G/DL (ref 6.4–8.3)
RBC # BLD AUTO: 3.96 M/UL (ref 3.5–5.5)
RBC # BLD: ABNORMAL 10*6/UL
RBC # BLD: ABNORMAL 10*6/UL
SODIUM SERPL-SCNC: 138 MMOL/L (ref 132–146)
WBC OTHER # BLD: 12.5 K/UL (ref 4.5–11.5)

## 2024-07-15 PROCEDURE — 70551 MRI BRAIN STEM W/O DYE: CPT

## 2024-07-15 PROCEDURE — 6360000002 HC RX W HCPCS: Performed by: NURSE PRACTITIONER

## 2024-07-15 PROCEDURE — 85025 COMPLETE CBC W/AUTO DIFF WBC: CPT

## 2024-07-15 PROCEDURE — 97530 THERAPEUTIC ACTIVITIES: CPT

## 2024-07-15 PROCEDURE — P9047 ALBUMIN (HUMAN), 25%, 50ML: HCPCS | Performed by: NURSE PRACTITIONER

## 2024-07-15 PROCEDURE — 36415 COLL VENOUS BLD VENIPUNCTURE: CPT

## 2024-07-15 PROCEDURE — 97110 THERAPEUTIC EXERCISES: CPT

## 2024-07-15 PROCEDURE — 0HBRXZZ EXCISION OF TOE NAIL, EXTERNAL APPROACH: ICD-10-PCS | Performed by: PODIATRIST

## 2024-07-15 PROCEDURE — 2700000000 HC OXYGEN THERAPY PER DAY

## 2024-07-15 PROCEDURE — 2060000000 HC ICU INTERMEDIATE R&B

## 2024-07-15 PROCEDURE — 97165 OT EVAL LOW COMPLEX 30 MIN: CPT

## 2024-07-15 PROCEDURE — 2580000003 HC RX 258

## 2024-07-15 PROCEDURE — 6360000002 HC RX W HCPCS: Performed by: INTERNAL MEDICINE

## 2024-07-15 PROCEDURE — 6370000000 HC RX 637 (ALT 250 FOR IP): Performed by: SPECIALIST

## 2024-07-15 PROCEDURE — 97535 SELF CARE MNGMENT TRAINING: CPT

## 2024-07-15 PROCEDURE — 6370000000 HC RX 637 (ALT 250 FOR IP)

## 2024-07-15 PROCEDURE — 80053 COMPREHEN METABOLIC PANEL: CPT

## 2024-07-15 PROCEDURE — 6360000002 HC RX W HCPCS

## 2024-07-15 RX ORDER — LACTOBACILLUS RHAMNOSUS GG 10B CELL
1 CAPSULE ORAL DAILY
Qty: 30 CAPSULE | Refills: 0 | Status: SHIPPED | OUTPATIENT
Start: 2024-07-16 | End: 2024-08-15

## 2024-07-15 RX ORDER — CIPROFLOXACIN 500 MG/1
500 TABLET, FILM COATED ORAL EVERY 12 HOURS SCHEDULED
Qty: 10 TABLET | Refills: 0 | Status: SHIPPED | OUTPATIENT
Start: 2024-07-15 | End: 2024-07-20

## 2024-07-15 RX ORDER — ALBUMIN (HUMAN) 12.5 G/50ML
50 SOLUTION INTRAVENOUS ONCE
Status: COMPLETED | OUTPATIENT
Start: 2024-07-15 | End: 2024-07-15

## 2024-07-15 RX ORDER — LORAZEPAM 2 MG/ML
0.5 INJECTION INTRAMUSCULAR ONCE
Status: COMPLETED | OUTPATIENT
Start: 2024-07-15 | End: 2024-07-15

## 2024-07-15 RX ORDER — METRONIDAZOLE 500 MG/1
500 TABLET ORAL EVERY 8 HOURS SCHEDULED
Qty: 15 TABLET | Refills: 0 | Status: SHIPPED | OUTPATIENT
Start: 2024-07-15 | End: 2024-07-20

## 2024-07-15 RX ORDER — FUROSEMIDE 10 MG/ML
40 INJECTION INTRAMUSCULAR; INTRAVENOUS ONCE
Status: COMPLETED | OUTPATIENT
Start: 2024-07-15 | End: 2024-07-15

## 2024-07-15 RX ADMIN — METRONIDAZOLE 500 MG: 500 TABLET ORAL at 21:47

## 2024-07-15 RX ADMIN — PANTOPRAZOLE SODIUM 40 MG: 40 TABLET, DELAYED RELEASE ORAL at 06:01

## 2024-07-15 RX ADMIN — METOPROLOL TARTRATE 50 MG: 50 TABLET, FILM COATED ORAL at 11:54

## 2024-07-15 RX ADMIN — ISOSORBIDE MONONITRATE 30 MG: 30 TABLET, EXTENDED RELEASE ORAL at 10:15

## 2024-07-15 RX ADMIN — ASPIRIN 325 MG: 325 TABLET, COATED ORAL at 10:11

## 2024-07-15 RX ADMIN — AMLODIPINE BESYLATE 10 MG: 5 TABLET ORAL at 11:54

## 2024-07-15 RX ADMIN — CYANOCOBALAMIN 1000 MCG: 1000 INJECTION, SOLUTION INTRAMUSCULAR; SUBCUTANEOUS at 10:14

## 2024-07-15 RX ADMIN — METRONIDAZOLE 500 MG: 500 TABLET ORAL at 15:05

## 2024-07-15 RX ADMIN — ENOXAPARIN SODIUM 30 MG: 100 INJECTION SUBCUTANEOUS at 21:48

## 2024-07-15 RX ADMIN — ENOXAPARIN SODIUM 30 MG: 100 INJECTION SUBCUTANEOUS at 10:11

## 2024-07-15 RX ADMIN — METRONIDAZOLE 500 MG: 500 TABLET ORAL at 06:01

## 2024-07-15 RX ADMIN — Medication 1 CAPSULE: at 10:12

## 2024-07-15 RX ADMIN — FUROSEMIDE 40 MG: 10 INJECTION, SOLUTION INTRAMUSCULAR; INTRAVENOUS at 15:33

## 2024-07-15 RX ADMIN — CIPROFLOXACIN HYDROCHLORIDE 500 MG: 500 TABLET, FILM COATED ORAL at 21:47

## 2024-07-15 RX ADMIN — ALBUMIN (HUMAN) 50 G: 0.25 INJECTION, SOLUTION INTRAVENOUS at 14:16

## 2024-07-15 RX ADMIN — SODIUM BICARBONATE 650 MG: 650 TABLET ORAL at 10:12

## 2024-07-15 RX ADMIN — HYDRALAZINE HYDROCHLORIDE 25 MG: 25 TABLET ORAL at 16:57

## 2024-07-15 RX ADMIN — SODIUM BICARBONATE 650 MG: 650 TABLET ORAL at 21:47

## 2024-07-15 RX ADMIN — LORAZEPAM 0.5 MG: 2 INJECTION INTRAMUSCULAR; INTRAVENOUS at 12:18

## 2024-07-15 RX ADMIN — METOPROLOL TARTRATE 50 MG: 50 TABLET, FILM COATED ORAL at 21:47

## 2024-07-15 RX ADMIN — HYDRALAZINE HYDROCHLORIDE 25 MG: 25 TABLET ORAL at 21:47

## 2024-07-15 RX ADMIN — SODIUM CHLORIDE: 9 INJECTION, SOLUTION INTRAVENOUS at 06:04

## 2024-07-15 RX ADMIN — CIPROFLOXACIN HYDROCHLORIDE 500 MG: 500 TABLET, FILM COATED ORAL at 10:14

## 2024-07-15 RX ADMIN — HYDRALAZINE HYDROCHLORIDE 25 MG: 25 TABLET ORAL at 06:01

## 2024-07-15 RX ADMIN — ATORVASTATIN CALCIUM 20 MG: 20 TABLET, FILM COATED ORAL at 10:15

## 2024-07-15 ASSESSMENT — PAIN SCALES - GENERAL: PAINLEVEL_OUTOF10: 0

## 2024-07-15 NOTE — PROGRESS NOTES
NAME: Lyla Ruiz  MR:  [id number]  :   1955  Admit Date:  2024    Elements of this note, were copied and pasted from Previous. Updates have been made where noted and reflect current exam and medical decision making from the DOS of this encounter.  CHIEF COMPLAINT       Chief Complaint   Patient presents with    Fall     Right knee pain and abrasion-fell oob at 0100 and was unable to get back in-reports generalized weakness and diarrhea for several days     HISTORY OF PRESENT ILLNESS     Lyla Ruiz is a 68 y.o. female admitted on 2024 and has  has a past medical history of Allergic rhinitis, Cerebrovascular disease, Cerebrovascular disease, Diabetes (HCC), Hyperlipidemia, Hypertension, Irregular heart beat, Myocardial infarct (HCC), Neuropathy, Obesity, Sleep apnea, Thyroid disease, and Weakness due to cerebrovascular accident (CVA).     This is a face to face encounter   07/15/24 back from MRI has no new c/oeating lunch   did not like her breakfast no c/o f/c/n/v/ stools beter  cr1  wbc11.6    no f/c had diarrhea no n/v/ did not eat mushroom    Patient is tolerating medications. No reported adverse drug reactions.  Available labs, imaging studies, microbiologic studies have been reviewed with pt and family if present.  Assessment & Plan     Admitted for   Rhabdomyolysis [M62.82]  Dehydration [E86.0]  Non-traumatic rhabdomyolysis [M62.82]  Acute renal failure, unspecified acute renal failure type (HCC) [N17.9]  Diarrhea, unspecified type [R19.7]  ID following for   Leukocytosis wbc11.9  Fevers   transverse colon concerning for colitis had diarrhea   ?food poisoning   Rhabdomyolysis better  Denis better       lactobacillus (CULTURELLE) capsule 1 capsule, Daily  Cont atbx   Check stools CHECK ROTAVIRUS-neg/NORAVIRUS    Cdiff/giardia/neg   Blood cx neg     DISPOSITION:  REVIEW OF SYSTEMS     As stated above      PHYSICAL EXAMINATION    BP (!) 148/68   Pulse 82   Temp 98.5 °F (36.9 °C)

## 2024-07-15 NOTE — PROGRESS NOTES
per day    cyanocobalamin  1,000 mcg IntraMUSCular Daily    enoxaparin  30 mg SubCUTAneous BID    lactobacillus  1 capsule Oral Daily    pantoprazole  40 mg Oral QAM AC    amLODIPine  10 mg Oral Daily    aspirin  325 mg Oral Daily    atorvastatin  20 mg Oral Daily    isosorbide mononitrate  30 mg Oral Daily    metoprolol tartrate  50 mg Oral BID       Objective Data:  Recent Labs     07/13/24  0738 07/14/24  0335 07/15/24  0630   WBC 11.9* 11.6* 12.5*   RBC 3.63 3.75 3.96   HGB 11.0* 11.1* 11.5   HCT 33.9* 34.6 35.3   MCV 93.4 92.3 89.1   MCH 30.3 29.6 29.0   MCHC 32.4 32.1 32.6   RDW 13.7 13.7 13.8   PLT  --  172  --    MPV 10.5 10.3 10.2     Recent Labs     07/13/24  0738 07/14/24  0335 07/14/24  1448 07/15/24  0630    134 134 138   K 4.8 6.2* 4.0 4.6   * 107 105 109*   CO2 17* 15* 17* 17*   BUN 15 9 6 5*   CREATININE 1.1* 1.0 0.9 0.8   GLUCOSE 161* 133* 231* 151*   CALCIUM 8.1* 8.1* 7.6* 8.0*   BILITOT 0.4 0.4  --  0.4   ALKPHOS 61 57  --  55   AST 28 24  --  16   ALT 18 15  --  14   ALBUMIN 2.9* 3.0*  --  3.0*     No results found for: \"TROPONINI\"     Assessment:  Pancolitis with improved GI symptomatology  Mechanical fall at home resulting in rhabdomyolysis  Coronary artery disease with mildly elevated troponin on presentation suggesting demand ischemia  Essential hypertension  Chronic kidney disease stage IV-V  Non-insulin-dependent diabetes mellitus type 2  Jelly esophageal reflux disease  Hyperlipidemia  Morbid obesity    Plan:   Lyla's GI symptomatology has entirely resolved at this point.  She has been transition to oral antibiotics and has tolerated this without complication.  Diet will be advanced.  In the setting of fluid resuscitation for rhabdomyolysis, she has begun to retain fluid.  IV fluids can be discontinued and pulsed dose albumin and Lasix will be administered today.  We are attempting to wean off nasal cannula oxygen but she may ultimately require this upon discharge.

## 2024-07-15 NOTE — PROGRESS NOTES
07/15/24 1017   Encounter Summary   Encounter Overview/Reason Spiritual/Emotional Needs   Service Provided For Patient   Referral/Consult From Rounding   Support System Family members   Last Encounter  07/15/24  (L-DL)   Complexity of Encounter Moderate   Spiritual/Emotional needs   Type Spiritual Support   Assessment/Intervention/Outcome   Assessment Calm;Coping   Intervention Active listening;Discussed belief system/Spiritism practices/braden;Discussed illness injury and it’s impact;Explored/Affirmed feelings, thoughts, concerns;Prayer (assurance of)/[place]   Outcome Engaged in conversation;Expressed Gratitude     Lyla was sitting up in the chair, alert and engaged during  rounding. At this time she has no need for spiritual services.  available for additional spiritual support as needed.   Jennifer Moulton, [facility], [facility] Contact at Ext: [phone]

## 2024-07-15 NOTE — ACP (ADVANCE CARE PLANNING)
Advance Care Planning   Healthcare Decision Maker:    Primary Decision Maker: Merrill Almanzar - Anish - [phone]    Primary Decision Maker: Dane Ruiz - Anish - [phone]

## 2024-07-15 NOTE — PROGRESS NOTES
Physical Therapy    Physical Therapy Treatment Note/Plan of Care    Room #:  0513/0513-01  Patient Name: Lyla Ruiz  YOB: 1955  MRN: [id number]    Date of Service: 7/15/2024     Tentative placement recommendation: Subacute Rehab vs home if able to ambulate and manage o2 line  Equipment recommendation: Wheeled Walker  if needed at d/c    Evaluating Physical Therapist: Bnoi Joseph, PT  #[id number]      Specific Provider Orders/Date/Referring Provider :     PT eval and treat  Start:  07/14/24 0930,   End:  07/14/24 0930,   ONE TIME,   Standing Count:  1 Occurrences,   R       Merrill Nova DO    Admitting Diagnosis:   Rhabdomyolysis [M62.82]  Dehydration [E86.0]  Non-traumatic rhabdomyolysis [M62.82]  Acute renal failure, unspecified acute renal failure type (HCC) [N17.9]  Diarrhea, unspecified type [R19.7]    Admitted with    slide out of bed d/t weakness  Surgery: none  Visit Diagnoses         Codes    Acute renal failure, unspecified acute renal failure type (HCC)    -  Primary N17.9    Dehydration     E86.0    Diarrhea, unspecified type     R19.7            Patient Active Problem List   Diagnosis    Leukocytosis    Acute cholecystitis    HLD (hyperlipidemia)    TIM (obstructive sleep apnea)    Type 2 diabetes mellitus, without long-term current use of insulin (HCC)    Morbid obesity with BMI of 40.0-44.9, adult (HCC)    Primary hypertension    Hyperthyroidism    Lactic acid acidosis    Renal mass    Hepatomegaly    Pleural effusion on right    Rhabdomyolysis        ASSESSMENT of Current Deficits Patient exhibits decreased strength, balance, endurance, and coordination impairing functional mobility, transfers, gait , gait distance, and tolerance to activity are barriers to d/c and require skilled intervention to address concerns listed above to increase safety and independence at discharge.   Decreased strength, balance and endurance  increases patient's risk for fall. Patient needs wheeled

## 2024-07-15 NOTE — PLAN OF CARE
Problem: Safety - Adult  Goal: Free from fall injury  Outcome: Progressing     Problem: Discharge Planning  Goal: Discharge to home or other facility with appropriate resources  Outcome: Progressing       Problem: Skin/Tissue Integrity  Goal: Absence of new skin breakdown  Description: 1.  Monitor for areas of redness and/or skin breakdown  2.  Assess vascular access sites hourly  3.  Every 4-6 hours minimum:  Change oxygen saturation probe site  4.  Every 4-6 hours:  If on nasal continuous positive airway pressure, respiratory therapy assess nares and determine need for appliance change or resting period.  Outcome: Progressing     Problem: ABCDS Injury Assessment  Goal: Absence of physical injury  Outcome: Progressing     Problem: Chronic Conditions and Co-morbidities  Goal: Patient's chronic conditions and co-morbidity symptoms are monitored and maintained or improved  Outcome: Progressing

## 2024-07-15 NOTE — CONSULTS
Department of Podiatry   Consult Note        Reason for Consult:  Onychomycosis / Diabetic Foot Examiantion  Requesting Physician:       CHIEF COMPLAINT:  Onychomycosis / Diabetic Foot Exam     HISTORY OF PRESENT ILLNESS: Patient states she has long painful toenails that she has been unable to cut in a long time.  She states that she also has a burn on the left second toe that she has been coping with a Band-Aid.  She says she does not follow with a podiatrist at this time.  Would like to start following up with 1 after discharge.    Medical History     Past Medical History:   Diagnosis Date    Allergic rhinitis     Cerebrovascular disease 2012    Cerebrovascular disease 2014    Diabetes (HCC)     type 2    Hyperlipidemia     Hypertension     Irregular heart beat 2001    Myocardial infarct (HCC) 2014    Neuropathy     right foot    Obesity     Sleep apnea     declines to to wear a CPAP    Thyroid disease     hyperthyroid    Weakness due to cerebrovascular accident (CVA) 2014    on right side       Surgical History:  Past Surgical History:   Procedure Laterality Date    CHOLECYSTECTOMY, LAPAROSCOPIC N/A 5/9/2022    LAPAROSCOPIC SUBTOTAL  CHOLECYSTECTOMY WITH PLACEMENT OF 19F CLOSED DRAIN performed by Delon JAMES MD at [facility] OR    COLONOSCOPY  2000    COLONOSCOPY  2016    HYSTERECTOMY (CERVIX STATUS UNKNOWN)  2001    complete       Family History:  Family History   Problem Relation Age of Onset    Diabetes Mother     High Blood Pressure Mother     High Cholesterol Mother     Heart Disease Father     Diabetes Father     High Blood Pressure Father     Diabetes Sister     Heart Disease Sister     High Blood Pressure Sister     High Blood Pressure Brother     Heart Disease Brother     Heart Attack Brother     Clotting Disorder Brother     Diabetes Maternal Aunt     Kidney Disease Maternal Aunt     Cancer Maternal Grandmother         ovarian    Diabetes Paternal Grandmother     Heart Disease Paternal Grandfather      Heart Disease Brother        Allergies:  Allergies   Allergen Reactions    Latex Swelling and Rash    Doxycycline Itching and Nausea Only    Seasonal Other (See Comments)     Watery eyes, sneezing and runny nose    Adhesive Tape Dermatitis and Rash       I reviewed the patient's past medical and surgical history, Medications and Allergies.    REVIEW OF SYSTEMS:    10 point review of systems is negative unless otherwise noted below:  Negative        PHYSICAL EXAM:       Vitals:    07/15/24 0757 07/15/24 1003 07/15/24 1114 07/15/24 1150   BP: (!) 152/68 (!) 133/50 135/67 (!) 148/68   Pulse: 89 (!) 108 84 82   Resp: 18 20 18 20   Temp: 98.7 °F (37.1 °C)  98.6 °F (37 °C) 98.5 °F (36.9 °C)   TempSrc: Oral  Oral Oral   SpO2: 91% 90% 91% 93%   Weight:       Height:              SKIN:  Skin is intact and well hydrated to the bilateral lower extremities.  There is a superficial burn to the dorsum of the second toe.  There is blood coming from the burn area at this time.  NAILS:  Nails 1-5 on the right and left are thickened, elongated and discolored dystrophic incurvated and painful with palpation  NEUROLOGIC:  Protective sensation is diminished by grossly intact  VASCULAR:  DP and PT pulses are palpable. CFT less than 3 seconds. Warm to warm from the tibial tuberosity to the distal aspect of the digits dorsally. Hair growth noted to the distal aspects dorsally.  2+ pitting edema noted to B/L lower extremities.  MUSCULOSKELETAL: Rectus foot type, no pain with ankle joint range of motion B/L.     LABS:  CBC with Differential:    Lab Results   Component Value Date/Time    WBC 12.5 07/15/2024 06:30 AM    RBC 3.96 07/15/2024 06:30 AM    HGB 11.5 07/15/2024 06:30 AM    HCT 35.3 07/15/2024 06:30 AM     07/14/2024 03:35 AM    MCV 89.1 07/15/2024 06:30 AM    MCH 29.0 07/15/2024 06:30 AM    MCHC 32.6 07/15/2024 06:30 AM    RDW 13.8 07/15/2024 06:30 AM    METASPCT 0.9 05/16/2022 06:10 PM    LYMPHOPCT 16 07/15/2024 06:30 AM

## 2024-07-15 NOTE — PROGRESS NOTES
OCCUPATIONAL THERAPY INITIAL EVALUATION    [facility]  [address] [place]        Date:7/15/2024                                                  Patient Name: Lyla Ruiz    MRN: [id number]    : 1955    Room: [address] [place] [address]      Evaluating OT: Raphael Mcnally OTR/L; #[id number]     Referring Provider and Specific Provider Orders/Date:      24  OT eval and treat  Start:  24,   End:  24,   ONE TIME,   Standing Count:  1 Occurrences,   R         Avtar, Merrill GUZMÁN,       Placement Recommendation: Subacute Rehab vs home with occupational therapy if patient continues to progress with OT       Diagnosis:   1. Acute renal failure, unspecified acute renal failure type (HCC)    2. Dehydration    3. Non-traumatic rhabdomyolysis    4. Diarrhea, unspecified type         Surgery: None      Pertinent Medical History:       Past Medical History:   Diagnosis Date    Allergic rhinitis     Cerebrovascular disease     Cerebrovascular disease     Diabetes (HCC)     type 2    Hyperlipidemia     Hypertension     Irregular heart beat     Myocardial infarct (HCC) 2014    Neuropathy     right foot    Obesity     Sleep apnea     declines to to wear a CPAP    Thyroid disease     hyperthyroid    Weakness due to cerebrovascular accident (CVA) 2014    on right side         Past Surgical History:   Procedure Laterality Date    CHOLECYSTECTOMY, LAPAROSCOPIC N/A 2022    LAPAROSCOPIC SUBTOTAL  CHOLECYSTECTOMY WITH PLACEMENT OF 19F CLOSED DRAIN performed by Delon JAMES MD at [facility] OR    COLONOSCOPY      COLONOSCOPY  2016    HYSTERECTOMY (CERVIX STATUS UNKNOWN)      complete        Precautions:  Fall Risk, NC O2     Assessment of current deficits:     [x] Functional mobility  [x]ADLs  [x] Strength               []Cognition    [x] Functional transfers   [x] IADLs         [] Safety Awareness   [x]Endurance    [] Fine

## 2024-07-15 NOTE — CARE COORDINATION
Case Management Assessment  Initial Evaluation    Date/Time of Evaluation: 7/15/2024 3:07 PM  Assessment Completed by: Yolie Lieberman RN    If patient is discharged prior to next notation, then this note serves as note for discharge by case management.    Patient Name: Lyla Ruiz                   YOB: 1955  Diagnosis: Rhabdomyolysis [M62.82]  Dehydration [E86.0]  Non-traumatic rhabdomyolysis [M62.82]  Acute renal failure, unspecified acute renal failure type (HCC) [N17.9]  Diarrhea, unspecified type [R19.7]                   Date / Time: 7/11/2024  7:43 AM    Patient Admission Status: Inpatient   Readmission Risk (Low < 19, Mod (19-27), High > 27): Readmission Risk Score: 13.1    Current PCP: Sukumar Simms Jr., DO  PCP verified by CM? Yes (Sukumar Simms)    Chart Reviewed: Yes      History Provided by: Patient  Patient Orientation: Alert and Oriented    Patient Cognition: Alert    Hospitalization in the last 30 days (Readmission):  No    If yes, Readmission Assessment in CM Navigator will be completed.    Advance Directives:      Code Status: Full Code   Patient's Primary Decision Maker is: Legal Next of Kin    Primary Decision Maker: Merrill Almanzar  Child - [phone]    Primary Decision Maker: Dane Ruiz - Child - [phone]    Discharge Planning:    Patient lives with: Family Members Type of Home: House  Primary Care Giver: Self  Patient Support Systems include: Family Members   Current Financial resources:    Current community resources:    Current services prior to admission: None            Current DME:              Type of Home Care services:  None    ADLS  Prior functional level: Independent in ADLs/IADLs  Current functional level: Independent in ADLs/IADLs    PT AM-PAC: 16 /24  OT AM-PAC: 18 /24    Family can provide assistance at DC: Yes  Would you like Case Management to discuss the discharge plan with any other family members/significant others, and if so, who? Yes  (pts jostin Momin & Dane)  Plans to Return to Present Housing: Yes  Other Identified Issues/Barriers to RETURNING to current housing: may need home O2  Potential Assistance needed at discharge: N/A            Potential DME:    Patient expects to discharge to: House  Plan for transportation at discharge:      Financial    Payor: [facility] MEDICARE / Plan: [employer] MEDIBLUE ESSENTIAL/PLUS / Product Type: *No Product type* /       Potential assistance Purchasing Medications:    Meds-to-Beds request: Yes      [id number] - [place], [place] - [address] [facility] -  [phone] - F [phone]  [address] [place] [id number]  Phone: [phone] Fax: [phone]      Notes:    Factors facilitating achievement of predicted outcomes: Family support, Cooperative, and Pleasant    Barriers to discharge: Medical complications    Additional Case Management Notes: 7/15/24 1447 CM note: no covid testing, bl cx 7/11/24 ngtd. PO flagyl & cipro. 2-4L nc; NO home O2. WILL NEED WALK TEST AND O2 SCRIPT IF HOME O2 IS NEEDED. Met with patient at the bedside to discuss transition of care at discharge. Patient resides with her sister, brother, nephew, and 2 great nephews in a 2 story home, 5 [address]UC[address] w/ 1/2 bath on 1 st floor, bed and bath 2nd floor w/ 15 steps. Pt is independent w/ ADLs, sister or son provides her transportation, and her DME includes a quad cane and glucometer/supplies (pt states she rarely checks her glucose levels). Her PCP is Sukumar Simms and her pharmacy is [facility]. No hx HHC or SNF. Discharge plan is home. Pt requests outpt PT/OT. WILL NEED WRITTEN SCRIPT. Provided pt with outpt therapy list. No preference to DME agency if home O2 is needed. Pts sister or son will provide transportation home. CM will follow. Electronically signed by Yolie Lieberman RN on 7/15/2024 at 3:07 PM      Yolie Lieberman RN  Case Management Department

## 2024-07-16 VITALS
OXYGEN SATURATION: 91 % | BODY MASS INDEX: 49.65 KG/M2 | HEART RATE: 76 BPM | HEIGHT: 61 IN | TEMPERATURE: 98.1 F | WEIGHT: 263 LBS | DIASTOLIC BLOOD PRESSURE: 66 MMHG | SYSTOLIC BLOOD PRESSURE: 124 MMHG | RESPIRATION RATE: 20 BRPM

## 2024-07-16 LAB
ALBUMIN SERPL-MCNC: 3.4 G/DL (ref 3.5–5.2)
ALP SERPL-CCNC: 48 U/L (ref 35–104)
ALT SERPL-CCNC: 11 U/L (ref 0–32)
ANION GAP SERPL CALCULATED.3IONS-SCNC: 13 MMOL/L (ref 7–16)
AST SERPL-CCNC: 16 U/L (ref 0–31)
BASOPHILS # BLD: 0 K/UL (ref 0–0.2)
BASOPHILS NFR BLD: 0 % (ref 0–2)
BILIRUB SERPL-MCNC: 0.5 MG/DL (ref 0–1.2)
BUN SERPL-MCNC: 5 MG/DL (ref 6–23)
CALCIUM SERPL-MCNC: 8.3 MG/DL (ref 8.6–10.2)
CHLORIDE SERPL-SCNC: 106 MMOL/L (ref 98–107)
CO2 SERPL-SCNC: 21 MMOL/L (ref 22–29)
CREAT SERPL-MCNC: 0.8 MG/DL (ref 0.5–1)
EOSINOPHIL # BLD: 0.44 K/UL (ref 0.05–0.5)
EOSINOPHILS RELATIVE PERCENT: 4 % (ref 0–6)
ERYTHROCYTE [DISTWIDTH] IN BLOOD BY AUTOMATED COUNT: 14 % (ref 11.5–15)
GFR, ESTIMATED: 78 ML/MIN/1.73M2
GLUCOSE SERPL-MCNC: 162 MG/DL (ref 74–99)
HCT VFR BLD AUTO: 33 % (ref 34–48)
HGB BLD-MCNC: 10.8 G/DL (ref 11.5–15.5)
LYMPHOCYTES NFR BLD: 1.33 K/UL (ref 1.5–4)
LYMPHOCYTES RELATIVE PERCENT: 11 % (ref 20–42)
MAGNESIUM SERPL-MCNC: 1.5 MG/DL (ref 1.6–2.6)
MCH RBC QN AUTO: 29.8 PG (ref 26–35)
MCHC RBC AUTO-ENTMCNC: 32.7 G/DL (ref 32–34.5)
MCV RBC AUTO: 90.9 FL (ref 80–99.9)
METAMYELOCYTES ABSOLUTE COUNT: 0.11 K/UL (ref 0–0.12)
METAMYELOCYTES: 1 % (ref 0–1)
MICROORGANISM SPEC CULT: NORMAL
MICROORGANISM SPEC CULT: NORMAL
MONOCYTES NFR BLD: 1.11 K/UL (ref 0.1–0.95)
MONOCYTES NFR BLD: 9 % (ref 2–12)
MYELOCYTES ABSOLUTE COUNT: 0.11 K/UL
MYELOCYTES: 1 %
NEUTROPHILS NFR BLD: 75 % (ref 43–80)
NEUTS SEG NFR BLD: 9.51 K/UL (ref 1.8–7.3)
NUCLEATED RED BLOOD CELLS: 1 PER 100 WBC
PHOSPHATE SERPL-MCNC: 1.7 MG/DL (ref 2.5–4.5)
PLATELET # BLD AUTO: 367 K/UL (ref 130–450)
PMV BLD AUTO: 9 FL (ref 7–12)
POTASSIUM SERPL-SCNC: 3.4 MMOL/L (ref 3.5–5)
PROT SERPL-MCNC: 6.2 G/DL (ref 6.4–8.3)
RBC # BLD AUTO: 3.63 M/UL (ref 3.5–5.5)
RBC # BLD: ABNORMAL 10*6/UL
SERVICE CMNT-IMP: NORMAL
SERVICE CMNT-IMP: NORMAL
SODIUM SERPL-SCNC: 140 MMOL/L (ref 132–146)
SPECIMEN DESCRIPTION: NORMAL
SPECIMEN DESCRIPTION: NORMAL
WBC OTHER # BLD: 12.6 K/UL (ref 4.5–11.5)

## 2024-07-16 PROCEDURE — 36415 COLL VENOUS BLD VENIPUNCTURE: CPT

## 2024-07-16 PROCEDURE — 84100 ASSAY OF PHOSPHORUS: CPT

## 2024-07-16 PROCEDURE — P9047 ALBUMIN (HUMAN), 25%, 50ML: HCPCS | Performed by: NURSE PRACTITIONER

## 2024-07-16 PROCEDURE — 6370000000 HC RX 637 (ALT 250 FOR IP)

## 2024-07-16 PROCEDURE — 6370000000 HC RX 637 (ALT 250 FOR IP): Performed by: SPECIALIST

## 2024-07-16 PROCEDURE — 6360000002 HC RX W HCPCS: Performed by: NURSE PRACTITIONER

## 2024-07-16 PROCEDURE — 2700000000 HC OXYGEN THERAPY PER DAY

## 2024-07-16 PROCEDURE — 85025 COMPLETE CBC W/AUTO DIFF WBC: CPT

## 2024-07-16 PROCEDURE — 6360000002 HC RX W HCPCS

## 2024-07-16 PROCEDURE — 97530 THERAPEUTIC ACTIVITIES: CPT

## 2024-07-16 PROCEDURE — 83735 ASSAY OF MAGNESIUM: CPT

## 2024-07-16 PROCEDURE — 80053 COMPREHEN METABOLIC PANEL: CPT

## 2024-07-16 RX ORDER — FUROSEMIDE 10 MG/ML
40 INJECTION INTRAMUSCULAR; INTRAVENOUS ONCE
Status: COMPLETED | OUTPATIENT
Start: 2024-07-16 | End: 2024-07-16

## 2024-07-16 RX ORDER — SODIUM BICARBONATE 650 MG/1
650 TABLET ORAL 2 TIMES DAILY
Qty: 60 TABLET | Refills: 0 | Status: SHIPPED | OUTPATIENT
Start: 2024-07-16 | End: 2024-08-15

## 2024-07-16 RX ORDER — POTASSIUM CHLORIDE 20 MEQ/1
20 TABLET, EXTENDED RELEASE ORAL DAILY
Qty: 90 TABLET | Refills: 1 | Status: SHIPPED | OUTPATIENT
Start: 2024-07-16

## 2024-07-16 RX ORDER — UREA 10 %
500 LOTION (ML) TOPICAL DAILY
Qty: 30 TABLET | Refills: 3 | Status: SHIPPED | OUTPATIENT
Start: 2024-07-16 | End: 2025-07-16

## 2024-07-16 RX ORDER — HYDRALAZINE HYDROCHLORIDE 25 MG/1
25 TABLET, FILM COATED ORAL EVERY 8 HOURS SCHEDULED
Qty: 90 TABLET | Refills: 3 | Status: SHIPPED | OUTPATIENT
Start: 2024-07-16

## 2024-07-16 RX ORDER — ISOSORBIDE MONONITRATE 30 MG/1
30 TABLET, EXTENDED RELEASE ORAL DAILY
Qty: 30 TABLET | Refills: 3 | Status: SHIPPED | OUTPATIENT
Start: 2024-07-17

## 2024-07-16 RX ORDER — ALBUMIN (HUMAN) 12.5 G/50ML
50 SOLUTION INTRAVENOUS ONCE
Status: COMPLETED | OUTPATIENT
Start: 2024-07-16 | End: 2024-07-16

## 2024-07-16 RX ADMIN — CYANOCOBALAMIN 1000 MCG: 1000 INJECTION, SOLUTION INTRAMUSCULAR; SUBCUTANEOUS at 08:26

## 2024-07-16 RX ADMIN — ALBUMIN (HUMAN) 50 G: 0.25 INJECTION, SOLUTION INTRAVENOUS at 10:14

## 2024-07-16 RX ADMIN — METOPROLOL TARTRATE 50 MG: 50 TABLET, FILM COATED ORAL at 08:27

## 2024-07-16 RX ADMIN — PANTOPRAZOLE SODIUM 40 MG: 40 TABLET, DELAYED RELEASE ORAL at 05:59

## 2024-07-16 RX ADMIN — ISOSORBIDE MONONITRATE 30 MG: 30 TABLET, EXTENDED RELEASE ORAL at 08:27

## 2024-07-16 RX ADMIN — FUROSEMIDE 40 MG: 10 INJECTION, SOLUTION INTRAMUSCULAR; INTRAVENOUS at 11:52

## 2024-07-16 RX ADMIN — HYDRALAZINE HYDROCHLORIDE 25 MG: 25 TABLET ORAL at 05:59

## 2024-07-16 RX ADMIN — ENOXAPARIN SODIUM 30 MG: 100 INJECTION SUBCUTANEOUS at 08:26

## 2024-07-16 RX ADMIN — ATORVASTATIN CALCIUM 20 MG: 20 TABLET, FILM COATED ORAL at 08:27

## 2024-07-16 RX ADMIN — AMLODIPINE BESYLATE 10 MG: 5 TABLET ORAL at 08:27

## 2024-07-16 RX ADMIN — METRONIDAZOLE 500 MG: 500 TABLET ORAL at 14:58

## 2024-07-16 RX ADMIN — CIPROFLOXACIN HYDROCHLORIDE 500 MG: 500 TABLET, FILM COATED ORAL at 08:27

## 2024-07-16 RX ADMIN — HYDRALAZINE HYDROCHLORIDE 25 MG: 25 TABLET ORAL at 14:58

## 2024-07-16 RX ADMIN — METRONIDAZOLE 500 MG: 500 TABLET ORAL at 05:59

## 2024-07-16 RX ADMIN — SODIUM BICARBONATE 650 MG: 650 TABLET ORAL at 08:27

## 2024-07-16 RX ADMIN — ASPIRIN 325 MG: 325 TABLET, COATED ORAL at 08:27

## 2024-07-16 RX ADMIN — Medication 1 CAPSULE: at 08:27

## 2024-07-16 NOTE — CARE COORDINATION
7/16/24 1130 CM note: no covid testing, bl cx 7/11/24 (-). 2L nc; no home O2. Discharge order noted. Po flagyl & cipro. Pt plans to stay with her sister (who lives a few streets from pts home in [place]) for at least a few days at discharge. Qualifying documentation and O2 order noted. Pt has no preference to DME agency. Referral given to [facility] liaison, and he arranged delivery of portable tank to pts room. Pt aware she needs to call [facility] for delivery of concentrator as soon as she gets home. Pt states she will f/u with her PCP for script for outpt therapy if she decides she needs that. Pts sister will provide transportation home. Electronically signed by Yolie Lieberman RN on 7/16/2024 at 12:51 PM

## 2024-07-16 NOTE — PROGRESS NOTES
Attempted to do walk test with pt, she did not want to do it now. She is restless, and wanted to lay down.

## 2024-07-16 NOTE — DISCHARGE SUMMARY
Internal Medicine Progress Note     [facility]     Merrill Nova D.O., JOVANY.                         Davon Armstrong D.O., SHAHBAZ Burger D.O.     Lyla [place], MSN, APRN, NP-C  John Reid, MSN, APRN-CNP  Adi Rashid, MSN, APRN, NP-C  Mansi Naqvi, MSN, APRN-CNP  Melita Rizvi, MSN, APRN, NP-C       Internal Medicine  Discharge Summary    NAME: Lyla Ruiz  :  1955  MRN:  [id number]  PCP:Sukumar Simms Jr., DO  ADMITTED: 2024      DISCHARGED: 24    ADMITTING PHYSICIAN: Merrill Nova DO    CONSULTANT(S):   IP CONSULT TO INFECTIOUS DISEASES  IP CONSULT TO VASCULAR ACCESS TEAM  IP CONSULT TO PODIATRY     ADMITTING DIAGNOSIS:   Rhabdomyolysis [M62.82]  Dehydration [E86.0]  Non-traumatic rhabdomyolysis [M62.82]  Acute renal failure, unspecified acute renal failure type (HCC) [N17.9]  Diarrhea, unspecified type [R19.7]     DISCHARGE DIAGNOSES:   Pancolitis with improved GI symptomatology  Mechanical fall at home resulting in rhabdomyolysis  Coronary artery disease with mildly elevated troponin on presentation suggesting demand ischemia  Essential hypertension  Chronic kidney disease stage IV-V  Non-insulin-dependent diabetes mellitus type 2  Jelly esophageal reflux disease  Hyperlipidemia  Morbid obesity    BRIEF HISTORY OF PRESENT ILLNESS:   This is a pleasant 68-year-old white female who was admitted to [facility]. The patient presented to the hospital here under the service of Dr. Simms. The patient presented to the hospital here after what appeared to be episode of diarrhea for the past several days. The patient had been complaining of some diffuse abdominal cramping pain. This started approximately 2-3 days ago. The patient states at approximately 1:20 this morning the patient wanted to get out of bed at which time she apparently fell. The patient landed on the floor at which time she laid there until  directed by them.  she is instructed to resume home medications and take new medications as indicated in the list above.  If the patient has a recurrence of symptoms, she is instructed to go to the ED.    Preparing for this patient's discharge, including paperwork, orders, instructions, and meeting with patient did require > 40 minutes.    Davon Armstrong DO   7/16/2024  9:41 AM

## 2024-07-16 NOTE — PROGRESS NOTES
NAME: Lyla Ruiz  MR:  [id number]  :   1955  Admit Date:  2024    Elements of this note, were copied and pasted from Previous. Updates have been made where noted and reflect current exam and medical decision making from the DOS of this encounter.  CHIEF COMPLAINT       Chief Complaint   Patient presents with    Fall     Right knee pain and abrasion-fell oob at 0100 and was unable to get back in-reports generalized weakness and diarrhea for several days     HISTORY OF PRESENT ILLNESS     Lyla Ruiz is a 68 y.o. female admitted on 2024 and has  has a past medical history of Allergic rhinitis, Cerebrovascular disease, Cerebrovascular disease, Diabetes (HCC), Hyperlipidemia, Hypertension, Irregular heart beat, Myocardial infarct (HCC), Neuropathy, Obesity, Sleep apnea, Thyroid disease, and Weakness due to cerebrovascular accident (CVA).     This is a face to face encounter   24 in chair eating has no c/o f/c had one bm   7/15 back from MRI has no new c/oeating lunch   did not like her breakfast no c/o f/c/n/v/ stools beter  cr1  wbc11.6    no f/c had diarrhea no n/v/ did not eat mushroom    Patient is tolerating medications. No reported adverse drug reactions.  Available labs, imaging studies, microbiologic studies have been reviewed with pt and family if present.  Assessment & Plan     Admitted for   Rhabdomyolysis [M62.82]  Dehydration [E86.0]  Non-traumatic rhabdomyolysis [M62.82]  Acute renal failure, unspecified acute renal failure type (HCC) [N17.9]  Diarrhea, unspecified type [R19.7]  ID following for   Leukocytosis    Fevers resolved  transverse colon concerning for colitis had diarrhea   ?food poisoning   Rhabdomyolysis better  Denis better       lactobacillus (CULTURELLE) capsule 1 capsule, Daily  Can finish atbx  Check stools CHECK ROTAVIRUS-neg/NORAVIRUS pending     Cdiff/giardia/neg   Blood cx neg   Can d/c   F/u prn   DISPOSITION:  REVIEW OF SYSTEMS     As stated  07/14/24  0335 07/15/24  0630 07/16/24  0842   AST 24 16 16   ALT 15 14 11          Radiology:  CT ABDOMEN PELVIS WO CONTRAST Additional Contrast? Oral    Result Date: 7/11/2024  1. Wall thickening of the transverse colon concerning for colitis extending to the descending portion without perforation or abscess. 2. Ventral abdominal wall hernia containing a single loop of small bowel Escalera type hernia however without evidence for acute inflammation or obstruction. 3. Hepatic steatosis. 4. Cardiomegaly. 5. Fat containing right direct inguinal hernia.     XR KNEE RIGHT (3 VIEWS)    Result Date: 7/11/2024  Mild-to-moderate tricompartmental DJD with medial joint space narrowing.     XR CHEST 1 VIEW    Result Date: 7/11/2024  Cardiomegaly.  There are no findings of failure or pneumonia.     Imaging and labs were reviewed per medical records.     Thank you for involving me in the care of Lyla Ruiz I will continue to follow. Please do not hesitate to call [phone] for any questions or concerns.    Electronically signed by Wendy Castañeda MD on 7/16/2024 at 12:20 PM

## 2024-07-16 NOTE — PROGRESS NOTES
CLINICAL PHARMACY NOTE: MEDS TO BEDS    Total # of Prescriptions Filled: 7   The following medications were delivered to the patient:  Ciprofloxacin 500 mg  Hydralazine 25 mg  Potassium chloride er 20  [facility] digestive health caps  Sodium bicarbonate 650 mg  Metronidazole 500 mg  Isosorbide mononitrate er 30    Additional Documentation:

## 2024-07-16 NOTE — PROGRESS NOTES
Next appt 10/28/22.  Para scheduled for 10/24/22 per [facility]    Physical Therapy Treatment Note/Plan of Care    Room #:  0513/0513-01  Patient Name: Lyla Ruiz  YOB: 1955  MRN: [id number]    Date of Service: 7/16/2024     Tentative placement recommendation: Subacute Rehab vs home if able to ambulate and manage o2 line  Equipment recommendation: Wheeled Walker  if needed at d/c    Evaluating Physical Therapist: Boni Joseph, PT  #[id number]      Specific Provider Orders/Date/Referring Provider :     PT eval and treat  Start:  07/14/24 0930,   End:  07/14/24 0930,   ONE TIME,   Standing Count:  1 Occurrences,   R       Merrill Nova DO    Admitting Diagnosis:   Rhabdomyolysis [M62.82]  Dehydration [E86.0]  Non-traumatic rhabdomyolysis [M62.82]  Acute renal failure, unspecified acute renal failure type (HCC) [N17.9]  Diarrhea, unspecified type [R19.7]    Admitted with    slide out of bed d/t weakness  Surgery: none  Visit Diagnoses         Codes    Acute renal failure, unspecified acute renal failure type (HCC)    -  Primary N17.9    Dehydration     E86.0    Diarrhea, unspecified type     R19.7            Patient Active Problem List   Diagnosis    Leukocytosis    Acute cholecystitis    HLD (hyperlipidemia)    TIM (obstructive sleep apnea)    Type 2 diabetes mellitus, without long-term current use of insulin (HCC)    Morbid obesity with BMI of 40.0-44.9, adult (HCC)    Primary hypertension    Hyperthyroidism    Lactic acid acidosis    Renal mass    Hepatomegaly    Pleural effusion on right    Rhabdomyolysis        ASSESSMENT of Current Deficits Patient's SPO2 dropped to 88% on 2L after ambulation; needing 15 seconds to recover to 90% and higher with cueing for pursed lip breathing. Pt able to progress with increased distance using a wheeled walker with cueing for pacing, pursed lip breathing, and safety. Pt requires skilled monitoring of patient's response and vital signs during treatment session.       PHYSICAL THERAPY  PLAN OF CARE  depends, ambulated in the hallway, and back to the chair. Her SPO2 dropped to 88% after ambulation; needing cues for pursed lip breathing. Pt educated on the importance of mobility, maintaining strength, endurance, and safety.     Therapeutic Exercises:  not performed    At end of session, patient in chair with  .  call light and phone within reach,  all lines and tubes intact, nursing notified.      Patient would benefit from continued skilled Physical Therapy to improve functional independence and quality of life.         Patient's/ family goals   home    Time in 09:13  Time out 09:38    Total Treatment Time  25 minutes        CPT codes:    Therapeutic activities (18610)   25 minutes  2 unit(s)    Victor Hugo Berrios  [facility] [place]  LIC # [id number]

## 2024-07-16 NOTE — FLOWSHEET NOTE
Pulse ox was__89__% on room air at rest.  Ambulated patient on room air.  Oxygen saturation was __88___% on room air while ambulating.  Oxygen applied.  Recovery pulse ox was __93___% on __2___liters of oxygen while ambulating.

## 2024-07-16 NOTE — CARE COORDINATION
Pt off floor in MRI when I went to give IMM, will check back to review IMM with pt and give copy. Electronically signed by Hayde Mitchell CMA

## 2024-07-16 NOTE — PLAN OF CARE
Problem: Safety - Adult  Goal: Free from fall injury  7/16/2024 0128 by Nasima Saucedo RN  Outcome: Progressing  7/15/2024 1402 by Noam Hutchinson RN  Outcome: Progressing     Problem: Discharge Planning  Goal: Discharge to home or other facility with appropriate resources  7/16/2024 0128 by Nasima Saucedo RN  Outcome: Progressing  Flowsheets (Taken 7/15/2024 2000)  Discharge to home or other facility with appropriate resources: Identify barriers to discharge with patient and caregiver  7/15/2024 1402 by Noam Hutchinson RN  Outcome: Progressing  Flowsheets (Taken 7/15/2024 1003)  Discharge to home or other facility with appropriate resources: Identify barriers to discharge with patient and caregiver     Problem: Pain  Goal: Verbalizes/displays adequate comfort level or baseline comfort level  Outcome: Progressing  Flowsheets  Taken 7/15/2024 2130 by Nasima Saucedo RN  Verbalizes/displays adequate comfort level or baseline comfort level: Encourage patient to monitor pain and request assistance  Taken 7/15/2024 1654 by Noam Hutchinson RN  Verbalizes/displays adequate comfort level or baseline comfort level: Encourage patient to monitor pain and request assistance  Taken 7/15/2024 1502 by Noam Hutchinson RN  Verbalizes/displays adequate comfort level or baseline comfort level: Encourage patient to monitor pain and request assistance  Taken 7/15/2024 1150 by Noam Hutchinson RN  Verbalizes/displays adequate comfort level or baseline comfort level: Encourage patient to monitor pain and request assistance     Problem: Skin/Tissue Integrity  Goal: Absence of new skin breakdown  Description: 1.  Monitor for areas of redness and/or skin breakdown  2.  Assess vascular access sites hourly  3.  Every 4-6 hours minimum:  Change oxygen saturation probe site  4.  Every 4-6 hours:  If on nasal continuous positive airway pressure, respiratory therapy assess nares and determine need for appliance change or resting

## 2024-07-16 NOTE — PLAN OF CARE
Problem: Safety - Adult  Goal: Free from fall injury  7/16/2024 1407 by Tone Llamas RN  Outcome: Progressing  7/16/2024 0128 by Nasima Saucedo RN  Outcome: Progressing     Problem: Discharge Planning  Goal: Discharge to home or other facility with appropriate resources  7/16/2024 1407 by Tone Llamas RN  Outcome: Progressing  Flowsheets (Taken 7/16/2024 0800)  Discharge to home or other facility with appropriate resources: Identify barriers to discharge with patient and caregiver  7/16/2024 0128 by Nasima Saucedo RN  Outcome: Progressing  Flowsheets (Taken 7/15/2024 2000)  Discharge to home or other facility with appropriate resources: Identify barriers to discharge with patient and caregiver     Problem: Pain  Goal: Verbalizes/displays adequate comfort level or baseline comfort level  7/16/2024 1407 by Tone Llamas RN  Outcome: Progressing  7/16/2024 0128 by Nasima Saucedo RN  Outcome: Progressing  Flowsheets  Taken 7/15/2024 2130 by Nasima Saucedo RN  Verbalizes/displays adequate comfort level or baseline comfort level: Encourage patient to monitor pain and request assistance  Taken 7/15/2024 1654 by oNam Hutchinson RN  Verbalizes/displays adequate comfort level or baseline comfort level: Encourage patient to monitor pain and request assistance  Taken 7/15/2024 1502 by Noam Hutchinson RN  Verbalizes/displays adequate comfort level or baseline comfort level: Encourage patient to monitor pain and request assistance  Taken 7/15/2024 1150 by Noma Hutchinson RN  Verbalizes/displays adequate comfort level or baseline comfort level: Encourage patient to monitor pain and request assistance     Problem: Skin/Tissue Integrity  Goal: Absence of new skin breakdown  Description: 1.  Monitor for areas of redness and/or skin breakdown  2.  Assess vascular access sites hourly  3.  Every 4-6 hours minimum:  Change oxygen saturation probe site  4.  Every 4-6 hours:  If on nasal continuous positive airway pressure,

## 2024-07-17 NOTE — PROGRESS NOTES
Physician Progress Note      PATIENT:               RUFUS SHARIF  CSN #:                  [id number]  :                       1955  ADMIT DATE:       2024 7:43 AM  DISCH DATE:        2024 3:43 PM  RESPONDING  PROVIDER #:        Merrill Cahtman DO          QUERY TEXT:    Pt admitted with Diarrhea and dehydration. Pt noted to have WBC 12.6 and   Lactic 2.6 with CT showing pancolitis . If possible, please document in the   progress notes and discharge summary if you are evaluating and /or treating   any of the following:  The medical record reflects the following:  Risk Factors: DM, GERD, HTN, Obesity, CKD  Clinical Indicators: WBC 12.6, Lactic 2.6, JASSON- Cr. 2.6, P 106, RR 28, BP   93/53. Patient is wearing 4L BNC. CT Abd- Wall thickening of the transverse   colon concerning for colitis extending  to the descending portion without perforation or abscess. Per H&P: Diarrhea   with pancolitis, rule out infectious etiology.  Treatment: Cipro, Flagyl, Protonix, ID consult,    Thank You  RADHA Baldwin, RN  Clinical Documentation Integrity  Options provided:  -- Sepsis, present on admission  -- Colitis without Sepsis  -- Other - I will add my own diagnosis  -- Disagree - Not applicable / Not valid  -- Disagree - Clinically unable to determine / Unknown  -- Refer to Clinical Documentation Reviewer    PROVIDER RESPONSE TEXT:    Sepsis, present on admission    Query created by: Rogelio Dixon on 7/15/2024 9:47 AM      Electronically signed by:  Merrill Chatman DO 2024 5:01 PM

## 2024-07-18 LAB
NOROVIRUS GI RNA STL QL NAA+PROBE: NOT DETECTED
NOROVIRUS GII RNA STL QL NAA+PROBE: NOT DETECTED

## 2024-08-04 ENCOUNTER — APPOINTMENT (OUTPATIENT)
Dept: CT IMAGING | Age: 69
End: 2024-08-04
Payer: MEDICARE

## 2024-08-04 ENCOUNTER — HOSPITAL ENCOUNTER (INPATIENT)
Age: 69
LOS: 3 days | Discharge: HOME | End: 2024-08-07
Attending: EMERGENCY MEDICINE | Admitting: INTERNAL MEDICINE
Payer: MEDICARE

## 2024-08-04 ENCOUNTER — APPOINTMENT (OUTPATIENT)
Dept: GENERAL RADIOLOGY | Age: 69
End: 2024-08-04
Payer: MEDICARE

## 2024-08-04 DIAGNOSIS — R09.02 HYPOXEMIA: ICD-10-CM

## 2024-08-04 DIAGNOSIS — R19.7 NAUSEA VOMITING AND DIARRHEA: Primary | ICD-10-CM

## 2024-08-04 DIAGNOSIS — R11.2 NAUSEA VOMITING AND DIARRHEA: Primary | ICD-10-CM

## 2024-08-04 PROBLEM — J96.01 ACUTE RESPIRATORY FAILURE WITH HYPOXIA (HCC): Status: ACTIVE | Noted: 2024-08-04

## 2024-08-04 LAB
ALBUMIN SERPL-MCNC: 4.2 G/DL (ref 3.5–5.2)
ALP SERPL-CCNC: 62 U/L (ref 35–104)
ALT SERPL-CCNC: 18 U/L (ref 0–32)
ANION GAP SERPL CALCULATED.3IONS-SCNC: 19 MMOL/L (ref 7–16)
AST SERPL-CCNC: 33 U/L (ref 0–31)
BACTERIA URNS QL MICRO: ABNORMAL
BASOPHILS # BLD: 0 K/UL (ref 0–0.2)
BASOPHILS NFR BLD: 0 % (ref 0–2)
BILIRUB DIRECT SERPL-MCNC: <0.2 MG/DL (ref 0–0.3)
BILIRUB INDIRECT SERPL-MCNC: ABNORMAL MG/DL (ref 0–1)
BILIRUB SERPL-MCNC: 0.7 MG/DL (ref 0–1.2)
BILIRUB UR QL STRIP: NEGATIVE
BUN SERPL-MCNC: 12 MG/DL (ref 6–23)
CALCIUM SERPL-MCNC: 8.9 MG/DL (ref 8.6–10.2)
CHLORIDE SERPL-SCNC: 97 MMOL/L (ref 98–107)
CLARITY UR: CLEAR
CO2 SERPL-SCNC: 24 MMOL/L (ref 22–29)
COLOR UR: YELLOW
CREAT SERPL-MCNC: 1.1 MG/DL (ref 0.5–1)
EOSINOPHIL # BLD: 0.14 K/UL (ref 0.05–0.5)
EOSINOPHILS RELATIVE PERCENT: 1 % (ref 0–6)
EPI CELLS #/AREA URNS HPF: ABNORMAL /HPF
ERYTHROCYTE [DISTWIDTH] IN BLOOD BY AUTOMATED COUNT: 14.6 % (ref 11.5–15)
GFR, ESTIMATED: 58 ML/MIN/1.73M2
GLUCOSE SERPL-MCNC: 155 MG/DL (ref 74–99)
GLUCOSE UR STRIP-MCNC: >=1000 MG/DL
HCT VFR BLD AUTO: 44.9 % (ref 34–48)
HGB BLD-MCNC: 13.7 G/DL (ref 11.5–15.5)
HGB UR QL STRIP.AUTO: NEGATIVE
INFLUENZA A BY PCR: NOT DETECTED
INFLUENZA B BY PCR: NOT DETECTED
KETONES UR STRIP-MCNC: ABNORMAL MG/DL
LACTATE BLDV-SCNC: 1.5 MMOL/L (ref 0.5–2.2)
LACTATE BLDV-SCNC: 3.2 MMOL/L (ref 0.5–2.2)
LEUKOCYTE ESTERASE UR QL STRIP: NEGATIVE
LYMPHOCYTES NFR BLD: 2.33 K/UL (ref 1.5–4)
LYMPHOCYTES RELATIVE PERCENT: 17 % (ref 20–42)
MCH RBC QN AUTO: 28.8 PG (ref 26–35)
MCHC RBC AUTO-ENTMCNC: 30.5 G/DL (ref 32–34.5)
MCV RBC AUTO: 94.5 FL (ref 80–99.9)
MONOCYTES NFR BLD: 1.64 K/UL (ref 0.1–0.95)
MONOCYTES NFR BLD: 12 % (ref 2–12)
MUCOUS THREADS URNS QL MICRO: PRESENT
NEUTROPHILS NFR BLD: 70 % (ref 43–80)
NEUTS SEG NFR BLD: 9.59 K/UL (ref 1.8–7.3)
NITRITE UR QL STRIP: NEGATIVE
PH UR STRIP: 6 [PH] (ref 5–9)
PLATELET # BLD AUTO: 286 K/UL (ref 130–450)
PMV BLD AUTO: 9.6 FL (ref 7–12)
POTASSIUM SERPL-SCNC: 3.9 MMOL/L (ref 3.5–5)
PROT SERPL-MCNC: 7.8 G/DL (ref 6.4–8.3)
PROT UR STRIP-MCNC: 30 MG/DL
RBC # BLD AUTO: 4.75 M/UL (ref 3.5–5.5)
RBC # BLD: ABNORMAL 10*6/UL
RBC # BLD: ABNORMAL 10*6/UL
RBC #/AREA URNS HPF: ABNORMAL /HPF
SARS-COV-2 RDRP RESP QL NAA+PROBE: NOT DETECTED
SODIUM SERPL-SCNC: 140 MMOL/L (ref 132–146)
SP GR UR STRIP: 1.02 (ref 1–1.03)
SPECIMEN DESCRIPTION: NORMAL
UROBILINOGEN UR STRIP-ACNC: 0.2 EU/DL (ref 0–1)
WBC #/AREA URNS HPF: ABNORMAL /HPF
WBC OTHER # BLD: 13.7 K/UL (ref 4.5–11.5)

## 2024-08-04 PROCEDURE — 74177 CT ABD & PELVIS W/CONTRAST: CPT

## 2024-08-04 PROCEDURE — 71045 X-RAY EXAM CHEST 1 VIEW: CPT

## 2024-08-04 PROCEDURE — 6360000002 HC RX W HCPCS: Performed by: EMERGENCY MEDICINE

## 2024-08-04 PROCEDURE — 83605 ASSAY OF LACTIC ACID: CPT

## 2024-08-04 PROCEDURE — 87154 CUL TYP ID BLD PTHGN 6+ TRGT: CPT

## 2024-08-04 PROCEDURE — 6360000002 HC RX W HCPCS: Performed by: INTERNAL MEDICINE

## 2024-08-04 PROCEDURE — 87449 NOS EACH ORGANISM AG IA: CPT

## 2024-08-04 PROCEDURE — 87040 BLOOD CULTURE FOR BACTERIA: CPT

## 2024-08-04 PROCEDURE — 96365 THER/PROPH/DIAG IV INF INIT: CPT

## 2024-08-04 PROCEDURE — 6360000004 HC RX CONTRAST MEDICATION: Performed by: RADIOLOGY

## 2024-08-04 PROCEDURE — 99285 EMERGENCY DEPT VISIT HI MDM: CPT

## 2024-08-04 PROCEDURE — 83036 HEMOGLOBIN GLYCOSYLATED A1C: CPT

## 2024-08-04 PROCEDURE — 81001 URINALYSIS AUTO W/SCOPE: CPT

## 2024-08-04 PROCEDURE — 6370000000 HC RX 637 (ALT 250 FOR IP): Performed by: INTERNAL MEDICINE

## 2024-08-04 PROCEDURE — 51701 INSERT BLADDER CATHETER: CPT

## 2024-08-04 PROCEDURE — 1200000000 HC SEMI PRIVATE

## 2024-08-04 PROCEDURE — 87502 INFLUENZA DNA AMP PROBE: CPT

## 2024-08-04 PROCEDURE — 87899 AGENT NOS ASSAY W/OPTIC: CPT

## 2024-08-04 PROCEDURE — 2580000003 HC RX 258: Performed by: EMERGENCY MEDICINE

## 2024-08-04 PROCEDURE — 6370000000 HC RX 637 (ALT 250 FOR IP): Performed by: EMERGENCY MEDICINE

## 2024-08-04 PROCEDURE — 96375 TX/PRO/DX INJ NEW DRUG ADDON: CPT

## 2024-08-04 PROCEDURE — 96374 THER/PROPH/DIAG INJ IV PUSH: CPT

## 2024-08-04 PROCEDURE — 86403 PARTICLE AGGLUT ANTBDY SCRN: CPT

## 2024-08-04 PROCEDURE — 87635 SARS-COV-2 COVID-19 AMP PRB: CPT

## 2024-08-04 PROCEDURE — 82248 BILIRUBIN DIRECT: CPT

## 2024-08-04 PROCEDURE — 80053 COMPREHEN METABOLIC PANEL: CPT

## 2024-08-04 PROCEDURE — 85025 COMPLETE CBC W/AUTO DIFF WBC: CPT

## 2024-08-04 PROCEDURE — 87324 CLOSTRIDIUM AG IA: CPT

## 2024-08-04 PROCEDURE — 0202U NFCT DS 22 TRGT SARS-COV-2: CPT

## 2024-08-04 RX ORDER — ISOSORBIDE MONONITRATE 30 MG/1
30 TABLET, EXTENDED RELEASE ORAL DAILY
Status: DISCONTINUED | OUTPATIENT
Start: 2024-08-05 | End: 2024-08-07 | Stop reason: HOSPADM

## 2024-08-04 RX ORDER — METOPROLOL TARTRATE 50 MG/1
50 TABLET, FILM COATED ORAL 2 TIMES DAILY
Status: DISCONTINUED | OUTPATIENT
Start: 2024-08-04 | End: 2024-08-07 | Stop reason: HOSPADM

## 2024-08-04 RX ORDER — ATORVASTATIN CALCIUM 20 MG/1
20 TABLET, FILM COATED ORAL DAILY
Status: DISCONTINUED | OUTPATIENT
Start: 2024-08-04 | End: 2024-08-05

## 2024-08-04 RX ORDER — ACETAMINOPHEN 650 MG/1
650 SUPPOSITORY RECTAL EVERY 6 HOURS PRN
Status: DISCONTINUED | OUTPATIENT
Start: 2024-08-04 | End: 2024-08-07 | Stop reason: HOSPADM

## 2024-08-04 RX ORDER — MAGNESIUM SULFATE IN WATER 40 MG/ML
2000 INJECTION, SOLUTION INTRAVENOUS PRN
Status: DISCONTINUED | OUTPATIENT
Start: 2024-08-04 | End: 2024-08-07 | Stop reason: HOSPADM

## 2024-08-04 RX ORDER — 0.9 % SODIUM CHLORIDE 0.9 %
1000 INTRAVENOUS SOLUTION INTRAVENOUS ONCE
Status: COMPLETED | OUTPATIENT
Start: 2024-08-04 | End: 2024-08-04

## 2024-08-04 RX ORDER — SODIUM CHLORIDE 0.9 % (FLUSH) 0.9 %
5-40 SYRINGE (ML) INJECTION PRN
Status: DISCONTINUED | OUTPATIENT
Start: 2024-08-04 | End: 2024-08-07 | Stop reason: HOSPADM

## 2024-08-04 RX ORDER — ACETAMINOPHEN 500 MG
1000 TABLET ORAL
Status: COMPLETED | OUTPATIENT
Start: 2024-08-04 | End: 2024-08-04

## 2024-08-04 RX ORDER — ACETAMINOPHEN 325 MG/1
650 TABLET ORAL EVERY 6 HOURS PRN
COMMUNITY
End: 2024-08-05 | Stop reason: ALTCHOICE

## 2024-08-04 RX ORDER — HYDRALAZINE HYDROCHLORIDE 25 MG/1
25 TABLET, FILM COATED ORAL EVERY 8 HOURS SCHEDULED
Status: DISCONTINUED | OUTPATIENT
Start: 2024-08-04 | End: 2024-08-05

## 2024-08-04 RX ORDER — ASPIRIN 325 MG
325 TABLET, DELAYED RELEASE (ENTERIC COATED) ORAL DAILY
Status: DISCONTINUED | OUTPATIENT
Start: 2024-08-04 | End: 2024-08-07 | Stop reason: HOSPADM

## 2024-08-04 RX ORDER — DEXTROSE MONOHYDRATE 100 MG/ML
INJECTION, SOLUTION INTRAVENOUS CONTINUOUS PRN
Status: DISCONTINUED | OUTPATIENT
Start: 2024-08-04 | End: 2024-08-05 | Stop reason: SDUPTHER

## 2024-08-04 RX ORDER — SODIUM CHLORIDE 9 MG/ML
INJECTION, SOLUTION INTRAVENOUS PRN
Status: DISCONTINUED | OUTPATIENT
Start: 2024-08-04 | End: 2024-08-07 | Stop reason: HOSPADM

## 2024-08-04 RX ORDER — METRONIDAZOLE 500 MG/100ML
500 INJECTION, SOLUTION INTRAVENOUS EVERY 8 HOURS
Status: DISCONTINUED | OUTPATIENT
Start: 2024-08-04 | End: 2024-08-07 | Stop reason: HOSPADM

## 2024-08-04 RX ORDER — ONDANSETRON 2 MG/ML
4 INJECTION INTRAMUSCULAR; INTRAVENOUS ONCE
Status: COMPLETED | OUTPATIENT
Start: 2024-08-04 | End: 2024-08-04

## 2024-08-04 RX ORDER — METRONIDAZOLE 500 MG/100ML
500 INJECTION, SOLUTION INTRAVENOUS ONCE
Status: COMPLETED | OUTPATIENT
Start: 2024-08-04 | End: 2024-08-04

## 2024-08-04 RX ORDER — POTASSIUM CHLORIDE 7.45 MG/ML
10 INJECTION INTRAVENOUS PRN
Status: DISCONTINUED | OUTPATIENT
Start: 2024-08-04 | End: 2024-08-07 | Stop reason: HOSPADM

## 2024-08-04 RX ORDER — LACTOBACILLUS RHAMNOSUS GG 10B CELL
1 CAPSULE ORAL DAILY
Status: DISCONTINUED | OUTPATIENT
Start: 2024-08-05 | End: 2024-08-05

## 2024-08-04 RX ORDER — GLUCAGON 1 MG/ML
1 KIT INJECTION PRN
Status: DISCONTINUED | OUTPATIENT
Start: 2024-08-04 | End: 2024-08-07 | Stop reason: HOSPADM

## 2024-08-04 RX ORDER — AMLODIPINE BESYLATE 10 MG/1
10 TABLET ORAL DAILY
Status: DISCONTINUED | OUTPATIENT
Start: 2024-08-05 | End: 2024-08-07 | Stop reason: HOSPADM

## 2024-08-04 RX ORDER — SODIUM BICARBONATE 650 MG/1
650 TABLET ORAL 2 TIMES DAILY
Status: DISCONTINUED | OUTPATIENT
Start: 2024-08-04 | End: 2024-08-05

## 2024-08-04 RX ORDER — SODIUM CHLORIDE 0.9 % (FLUSH) 0.9 %
5-40 SYRINGE (ML) INJECTION EVERY 12 HOURS SCHEDULED
Status: DISCONTINUED | OUTPATIENT
Start: 2024-08-04 | End: 2024-08-07 | Stop reason: HOSPADM

## 2024-08-04 RX ORDER — POLYETHYLENE GLYCOL 3350 17 G/17G
17 POWDER, FOR SOLUTION ORAL DAILY PRN
Status: DISCONTINUED | OUTPATIENT
Start: 2024-08-04 | End: 2024-08-07 | Stop reason: HOSPADM

## 2024-08-04 RX ORDER — ACETAMINOPHEN 325 MG/1
650 TABLET ORAL EVERY 6 HOURS PRN
Status: DISCONTINUED | OUTPATIENT
Start: 2024-08-04 | End: 2024-08-07 | Stop reason: HOSPADM

## 2024-08-04 RX ORDER — POTASSIUM CHLORIDE 20 MEQ/1
40 TABLET, EXTENDED RELEASE ORAL PRN
Status: DISCONTINUED | OUTPATIENT
Start: 2024-08-04 | End: 2024-08-07 | Stop reason: HOSPADM

## 2024-08-04 RX ORDER — ENOXAPARIN SODIUM 100 MG/ML
30 INJECTION SUBCUTANEOUS 2 TIMES DAILY
Status: DISCONTINUED | OUTPATIENT
Start: 2024-08-04 | End: 2024-08-07 | Stop reason: HOSPADM

## 2024-08-04 RX ADMIN — METRONIDAZOLE 500 MG: 500 INJECTION, SOLUTION INTRAVENOUS at 16:40

## 2024-08-04 RX ADMIN — WATER 1000 MG: 1 INJECTION INTRAMUSCULAR; INTRAVENOUS; SUBCUTANEOUS at 16:36

## 2024-08-04 RX ADMIN — METOPROLOL TARTRATE 50 MG: 50 TABLET, FILM COATED ORAL at 23:49

## 2024-08-04 RX ADMIN — ENOXAPARIN SODIUM 30 MG: 100 INJECTION SUBCUTANEOUS at 23:49

## 2024-08-04 RX ADMIN — ACETAMINOPHEN 1000 MG: 500 TABLET ORAL at 14:21

## 2024-08-04 RX ADMIN — ASPIRIN 325 MG: 325 TABLET, COATED ORAL at 23:50

## 2024-08-04 RX ADMIN — HYDRALAZINE HYDROCHLORIDE 25 MG: 25 TABLET ORAL at 23:49

## 2024-08-04 RX ADMIN — SODIUM BICARBONATE 650 MG: 650 TABLET ORAL at 23:50

## 2024-08-04 RX ADMIN — SODIUM CHLORIDE 1000 ML: 9 INJECTION, SOLUTION INTRAVENOUS at 10:22

## 2024-08-04 RX ADMIN — IOPAMIDOL 75 ML: 755 INJECTION, SOLUTION INTRAVENOUS at 13:48

## 2024-08-04 RX ADMIN — SODIUM CHLORIDE 1000 ML: 9 INJECTION, SOLUTION INTRAVENOUS at 16:05

## 2024-08-04 RX ADMIN — METRONIDAZOLE 500 MG: 500 INJECTION, SOLUTION INTRAVENOUS at 23:49

## 2024-08-04 RX ADMIN — ATORVASTATIN CALCIUM 20 MG: 20 TABLET, FILM COATED ORAL at 23:49

## 2024-08-04 RX ADMIN — ONDANSETRON 4 MG: 2 INJECTION INTRAMUSCULAR; INTRAVENOUS at 10:23

## 2024-08-04 NOTE — ED PROVIDER NOTES
[facility] 4 MED SURG TELE  EMERGENCY DEPARTMENT ENCOUNTER        Pt Name: Lyla Ruiz  MRN: [id number]  Birthdate 1955  Date of evaluation: 8/4/2024  Provider: Catherine Jaffe DO  PCP: Sukumar Simms Jr., DO  Note Started: 4:16 PM EDT 8/4/24    CHIEF COMPLAINT       Chief Complaint   Patient presents with    Emesis    Diarrhea       HISTORY OF PRESENT ILLNESS: 1 or more Elements        Limitations to history : None    Lyla Ruiz is a 68 y.o. female brought in by EMS for evaluation of nausea, vomiting and diarrhea.  She was recently admitted for pancolitis as well as rhabdo.  She states since discharge she has not had any diarrhea until this morning.  Denies fever or chills.  Denies any abdominal pain.  Denies any urinary symptoms.    Nursing Notes were all reviewed and agreed with or any disagreements were addressed in the HPI.      REVIEW OF EXTERNAL NOTE :       Reviewed previous admission on 7/11/2024      Chart Review/External Note Review    Last Echo reviewed by Me:  No results found for: \"LVEF\", \"LVEFMODE\"          Controlled Substance Monitoring:    Acute and Chronic Pain Monitoring:        No data to display                    REVIEW OF SYSTEMS :      Positives and Pertinent negatives as per HPI.     SURGICAL HISTORY     Past Surgical History:   Procedure Laterality Date    CHOLECYSTECTOMY, LAPAROSCOPIC N/A 5/9/2022    LAPAROSCOPIC SUBTOTAL  CHOLECYSTECTOMY WITH PLACEMENT OF 19F CLOSED DRAIN performed by Delon JAMES MD at [facility]    COLONOSCOPY  2000    COLONOSCOPY  2016    HYSTERECTOMY (CERVIX STATUS UNKNOWN)  2001    complete       CURRENTMEDICATIONS       Current Discharge Medication List        CONTINUE these medications which have NOT CHANGED    Details   metFORMIN (GLUCOPHAGE-XR) 500 MG extended release tablet Take 2 tablets by mouth 2 times daily      empagliflozin (JARDIANCE) 10 MG tablet Take 1 tablet by mouth daily      VITAMIN E PO Take 1 capsule by mouth daily      SHAYNA

## 2024-08-05 LAB
ALBUMIN SERPL-MCNC: 3.7 G/DL (ref 3.5–5.2)
ALP SERPL-CCNC: 50 U/L (ref 35–104)
ALT SERPL-CCNC: 18 U/L (ref 0–32)
ANION GAP SERPL CALCULATED.3IONS-SCNC: 17 MMOL/L (ref 7–16)
ASO AB SERPL-ACNC: 317 IU/ML (ref 0–200)
AST SERPL-CCNC: 32 U/L (ref 0–31)
B PARAP IS1001 DNA NPH QL NAA+NON-PROBE: NOT DETECTED
B PERT DNA SPEC QL NAA+PROBE: NOT DETECTED
BASOPHILS # BLD: 0 K/UL (ref 0–0.2)
BASOPHILS NFR BLD: 0 % (ref 0–2)
BILIRUB SERPL-MCNC: 0.4 MG/DL (ref 0–1.2)
BNP SERPL-MCNC: 1116 PG/ML (ref 0–450)
BUN SERPL-MCNC: 12 MG/DL (ref 6–23)
C DIFF GDH + TOXINS A+B STL QL IA.RAPID: NEGATIVE
C PNEUM DNA NPH QL NAA+NON-PROBE: NOT DETECTED
CALCIUM SERPL-MCNC: 8.3 MG/DL (ref 8.6–10.2)
CHLORIDE SERPL-SCNC: 104 MMOL/L (ref 98–107)
CO2 SERPL-SCNC: 23 MMOL/L (ref 22–29)
CREAT SERPL-MCNC: 1.2 MG/DL (ref 0.5–1)
CRP SERPL HS-MCNC: 115 MG/L (ref 0–5)
EOSINOPHIL # BLD: 0 K/UL (ref 0.05–0.5)
EOSINOPHILS RELATIVE PERCENT: 0 % (ref 0–6)
ERYTHROCYTE [DISTWIDTH] IN BLOOD BY AUTOMATED COUNT: 14.4 % (ref 11.5–15)
ERYTHROCYTE [SEDIMENTATION RATE] IN BLOOD BY WESTERGREN METHOD: 35 MM/HR (ref 0–20)
FLUAV RNA NPH QL NAA+NON-PROBE: NOT DETECTED
FLUBV RNA NPH QL NAA+NON-PROBE: NOT DETECTED
GFR, ESTIMATED: 50 ML/MIN/1.73M2
GLUCOSE BLD-MCNC: 151 MG/DL (ref 74–99)
GLUCOSE BLD-MCNC: 155 MG/DL (ref 74–99)
GLUCOSE BLD-MCNC: 160 MG/DL (ref 74–99)
GLUCOSE SERPL-MCNC: 149 MG/DL (ref 74–99)
HADV DNA NPH QL NAA+NON-PROBE: NOT DETECTED
HBA1C MFR BLD: 7.1 % (ref 4–5.6)
HCOV 229E RNA NPH QL NAA+NON-PROBE: NOT DETECTED
HCOV HKU1 RNA NPH QL NAA+NON-PROBE: NOT DETECTED
HCOV NL63 RNA NPH QL NAA+NON-PROBE: NOT DETECTED
HCOV OC43 RNA NPH QL NAA+NON-PROBE: NOT DETECTED
HCT VFR BLD AUTO: 40.5 % (ref 34–48)
HGB BLD-MCNC: 12.6 G/DL (ref 11.5–15.5)
HMPV RNA NPH QL NAA+NON-PROBE: NOT DETECTED
HPIV1 RNA NPH QL NAA+NON-PROBE: NOT DETECTED
HPIV2 RNA NPH QL NAA+NON-PROBE: NOT DETECTED
HPIV3 RNA NPH QL NAA+NON-PROBE: NOT DETECTED
HPIV4 RNA NPH QL NAA+NON-PROBE: NOT DETECTED
L PNEUMO1 AG UR QL IA.RAPID: NEGATIVE
LYMPHOCYTES NFR BLD: 0.38 K/UL (ref 1.5–4)
LYMPHOCYTES RELATIVE PERCENT: 5 % (ref 20–42)
M PNEUMO DNA NPH QL NAA+NON-PROBE: NOT DETECTED
MAGNESIUM SERPL-MCNC: 1.7 MG/DL (ref 1.6–2.6)
MCH RBC QN AUTO: 29.2 PG (ref 26–35)
MCHC RBC AUTO-ENTMCNC: 31.1 G/DL (ref 32–34.5)
MCV RBC AUTO: 94 FL (ref 80–99.9)
METAMYELOCYTES ABSOLUTE COUNT: 0.06 K/UL (ref 0–0.12)
METAMYELOCYTES: 1 % (ref 0–1)
MONOCYTES NFR BLD: 0.56 K/UL (ref 0.1–0.95)
MONOCYTES NFR BLD: 8 % (ref 2–12)
MYELOCYTES ABSOLUTE COUNT: 0.06 K/UL
MYELOCYTES: 1 %
NEUTROPHILS NFR BLD: 85 % (ref 43–80)
NEUTS SEG NFR BLD: 6.14 K/UL (ref 1.8–7.3)
PHOSPHATE SERPL-MCNC: 3.9 MG/DL (ref 2.5–4.5)
PLATELET # BLD AUTO: 255 K/UL (ref 130–450)
PMV BLD AUTO: 9.8 FL (ref 7–12)
POTASSIUM SERPL-SCNC: 3.4 MMOL/L (ref 3.5–5)
PROCALCITONIN SERPL-MCNC: 0.54 NG/ML (ref 0–0.08)
PROT SERPL-MCNC: 6.9 G/DL (ref 6.4–8.3)
RBC # BLD AUTO: 4.31 M/UL (ref 3.5–5.5)
RBC # BLD: NORMAL 10*6/UL
RSV RNA NPH QL NAA+NON-PROBE: NOT DETECTED
RV+EV RNA NPH QL NAA+NON-PROBE: NOT DETECTED
S PNEUM AG SPEC QL: NEGATIVE
SARS-COV-2 RNA NPH QL NAA+NON-PROBE: NOT DETECTED
SODIUM SERPL-SCNC: 144 MMOL/L (ref 132–146)
SPECIMEN DESCRIPTION: NORMAL
SPECIMEN DESCRIPTION: NORMAL
SPECIMEN SOURCE: NORMAL
T4 FREE SERPL-MCNC: 1.2 NG/DL (ref 0.9–1.7)
TSH SERPL DL<=0.05 MIU/L-ACNC: 4.97 UIU/ML (ref 0.27–4.2)
WBC OTHER # BLD: 7.2 K/UL (ref 4.5–11.5)

## 2024-08-05 PROCEDURE — 6360000002 HC RX W HCPCS: Performed by: NURSE PRACTITIONER

## 2024-08-05 PROCEDURE — 83880 ASSAY OF NATRIURETIC PEPTIDE: CPT

## 2024-08-05 PROCEDURE — 84145 PROCALCITONIN (PCT): CPT

## 2024-08-05 PROCEDURE — 86063 ANTISTREPTOLYSIN O SCREEN: CPT

## 2024-08-05 PROCEDURE — 85652 RBC SED RATE AUTOMATED: CPT

## 2024-08-05 PROCEDURE — 86140 C-REACTIVE PROTEIN: CPT

## 2024-08-05 PROCEDURE — 2580000003 HC RX 258: Performed by: INTERNAL MEDICINE

## 2024-08-05 PROCEDURE — 80053 COMPREHEN METABOLIC PANEL: CPT

## 2024-08-05 PROCEDURE — 6370000000 HC RX 637 (ALT 250 FOR IP): Performed by: INTERNAL MEDICINE

## 2024-08-05 PROCEDURE — 84443 ASSAY THYROID STIM HORMONE: CPT

## 2024-08-05 PROCEDURE — 84100 ASSAY OF PHOSPHORUS: CPT

## 2024-08-05 PROCEDURE — 1200000000 HC SEMI PRIVATE

## 2024-08-05 PROCEDURE — 84439 ASSAY OF FREE THYROXINE: CPT

## 2024-08-05 PROCEDURE — 6360000002 HC RX W HCPCS: Performed by: INTERNAL MEDICINE

## 2024-08-05 PROCEDURE — 82962 GLUCOSE BLOOD TEST: CPT

## 2024-08-05 PROCEDURE — 6370000000 HC RX 637 (ALT 250 FOR IP): Performed by: NURSE PRACTITIONER

## 2024-08-05 PROCEDURE — 85025 COMPLETE CBC W/AUTO DIFF WBC: CPT

## 2024-08-05 PROCEDURE — 83735 ASSAY OF MAGNESIUM: CPT

## 2024-08-05 PROCEDURE — 2580000003 HC RX 258: Performed by: NURSE PRACTITIONER

## 2024-08-05 RX ORDER — ATORVASTATIN CALCIUM 40 MG/1
40 TABLET, FILM COATED ORAL DAILY
Status: DISCONTINUED | OUTPATIENT
Start: 2024-08-06 | End: 2024-08-07 | Stop reason: HOSPADM

## 2024-08-05 RX ORDER — ALBUTEROL SULFATE 2.5 MG/3ML
2.5 SOLUTION RESPIRATORY (INHALATION) EVERY 4 HOURS PRN
Status: DISCONTINUED | OUTPATIENT
Start: 2024-08-05 | End: 2024-08-07 | Stop reason: HOSPADM

## 2024-08-05 RX ORDER — LACTOBACILLUS RHAMNOSUS GG 10B CELL
1 CAPSULE ORAL 2 TIMES DAILY WITH MEALS
Status: DISCONTINUED | OUTPATIENT
Start: 2024-08-05 | End: 2024-08-07 | Stop reason: HOSPADM

## 2024-08-05 RX ORDER — CHOLESTYRAMINE 4 G/9G
1 POWDER, FOR SUSPENSION ORAL 2 TIMES DAILY
Status: DISCONTINUED | OUTPATIENT
Start: 2024-08-05 | End: 2024-08-07 | Stop reason: HOSPADM

## 2024-08-05 RX ORDER — HYDRALAZINE HYDROCHLORIDE 25 MG/1
25 TABLET, FILM COATED ORAL DAILY
Status: DISCONTINUED | OUTPATIENT
Start: 2024-08-06 | End: 2024-08-07 | Stop reason: HOSPADM

## 2024-08-05 RX ORDER — INSULIN LISPRO 100 [IU]/ML
0-4 INJECTION, SOLUTION INTRAVENOUS; SUBCUTANEOUS NIGHTLY
Status: DISCONTINUED | OUTPATIENT
Start: 2024-08-05 | End: 2024-08-07 | Stop reason: HOSPADM

## 2024-08-05 RX ORDER — DEXTROSE MONOHYDRATE 100 MG/ML
INJECTION, SOLUTION INTRAVENOUS CONTINUOUS PRN
Status: DISCONTINUED | OUTPATIENT
Start: 2024-08-05 | End: 2024-08-07 | Stop reason: HOSPADM

## 2024-08-05 RX ORDER — GUAIFENESIN 600 MG/1
1200 TABLET, EXTENDED RELEASE ORAL 2 TIMES DAILY
Status: DISCONTINUED | OUTPATIENT
Start: 2024-08-05 | End: 2024-08-07 | Stop reason: HOSPADM

## 2024-08-05 RX ORDER — INSULIN LISPRO 100 [IU]/ML
0-4 INJECTION, SOLUTION INTRAVENOUS; SUBCUTANEOUS
Status: DISCONTINUED | OUTPATIENT
Start: 2024-08-05 | End: 2024-08-07 | Stop reason: HOSPADM

## 2024-08-05 RX ORDER — METFORMIN HYDROCHLORIDE 500 MG/1
1000 TABLET, EXTENDED RELEASE ORAL 2 TIMES DAILY
Status: ON HOLD | COMMUNITY
End: 2024-08-07 | Stop reason: HOSPADM

## 2024-08-05 RX ORDER — SODIUM CHLORIDE, SODIUM LACTATE, POTASSIUM CHLORIDE, CALCIUM CHLORIDE 600; 310; 30; 20 MG/100ML; MG/100ML; MG/100ML; MG/100ML
INJECTION, SOLUTION INTRAVENOUS CONTINUOUS
Status: DISCONTINUED | OUTPATIENT
Start: 2024-08-05 | End: 2024-08-07 | Stop reason: HOSPADM

## 2024-08-05 RX ORDER — LOPERAMIDE HYDROCHLORIDE 2 MG/1
2 CAPSULE ORAL 4 TIMES DAILY PRN
Status: DISCONTINUED | OUTPATIENT
Start: 2024-08-05 | End: 2024-08-07 | Stop reason: HOSPADM

## 2024-08-05 RX ADMIN — Medication 1 CAPSULE: at 16:19

## 2024-08-05 RX ADMIN — METOPROLOL TARTRATE 50 MG: 50 TABLET, FILM COATED ORAL at 08:59

## 2024-08-05 RX ADMIN — ISOSORBIDE MONONITRATE 30 MG: 30 TABLET, EXTENDED RELEASE ORAL at 08:59

## 2024-08-05 RX ADMIN — LOPERAMIDE HYDROCHLORIDE 2 MG: 2 CAPSULE ORAL at 16:18

## 2024-08-05 RX ADMIN — SODIUM CHLORIDE, PRESERVATIVE FREE 10 ML: 5 INJECTION INTRAVENOUS at 08:58

## 2024-08-05 RX ADMIN — ASPIRIN 325 MG: 325 TABLET, COATED ORAL at 08:59

## 2024-08-05 RX ADMIN — ENOXAPARIN SODIUM 30 MG: 100 INJECTION SUBCUTANEOUS at 08:58

## 2024-08-05 RX ADMIN — Medication 1 CAPSULE: at 08:59

## 2024-08-05 RX ADMIN — SODIUM BICARBONATE 650 MG: 650 TABLET ORAL at 09:06

## 2024-08-05 RX ADMIN — METRONIDAZOLE 500 MG: 500 INJECTION, SOLUTION INTRAVENOUS at 13:03

## 2024-08-05 RX ADMIN — LOPERAMIDE HYDROCHLORIDE 2 MG: 2 CAPSULE ORAL at 22:12

## 2024-08-05 RX ADMIN — AZITHROMYCIN MONOHYDRATE 500 MG: 500 INJECTION, POWDER, LYOPHILIZED, FOR SOLUTION INTRAVENOUS at 09:18

## 2024-08-05 RX ADMIN — CHOLESTYRAMINE 4 G: 4 POWDER, FOR SUSPENSION ORAL at 16:18

## 2024-08-05 RX ADMIN — ATORVASTATIN CALCIUM 20 MG: 20 TABLET, FILM COATED ORAL at 08:59

## 2024-08-05 RX ADMIN — ENOXAPARIN SODIUM 30 MG: 100 INJECTION SUBCUTANEOUS at 22:13

## 2024-08-05 RX ADMIN — METOPROLOL TARTRATE 50 MG: 50 TABLET, FILM COATED ORAL at 22:12

## 2024-08-05 RX ADMIN — GUAIFENESIN 1200 MG: 600 TABLET, EXTENDED RELEASE ORAL at 08:59

## 2024-08-05 RX ADMIN — GUAIFENESIN 1200 MG: 600 TABLET, EXTENDED RELEASE ORAL at 22:12

## 2024-08-05 RX ADMIN — HYDRALAZINE HYDROCHLORIDE 25 MG: 25 TABLET ORAL at 08:59

## 2024-08-05 RX ADMIN — POTASSIUM CHLORIDE 40 MEQ: 1500 TABLET, EXTENDED RELEASE ORAL at 12:58

## 2024-08-05 RX ADMIN — CHOLESTYRAMINE 4 G: 4 POWDER, FOR SUSPENSION ORAL at 22:13

## 2024-08-05 RX ADMIN — WATER 1000 MG: 1 INJECTION INTRAMUSCULAR; INTRAVENOUS; SUBCUTANEOUS at 11:48

## 2024-08-05 RX ADMIN — SODIUM CHLORIDE, PRESERVATIVE FREE 10 ML: 5 INJECTION INTRAVENOUS at 22:13

## 2024-08-05 RX ADMIN — SODIUM CHLORIDE, POTASSIUM CHLORIDE, SODIUM LACTATE AND CALCIUM CHLORIDE: 600; 310; 30; 20 INJECTION, SOLUTION INTRAVENOUS at 09:19

## 2024-08-05 RX ADMIN — METRONIDAZOLE 500 MG: 500 INJECTION, SOLUTION INTRAVENOUS at 22:25

## 2024-08-05 RX ADMIN — AMLODIPINE BESYLATE 10 MG: 10 TABLET ORAL at 08:59

## 2024-08-05 ASSESSMENT — PAIN SCALES - GENERAL: PAINLEVEL_OUTOF10: 0

## 2024-08-05 NOTE — PROGRESS NOTES
rashes, ulcers, or excoriations.  Denies bruising.  Hematologic/Lymphatic:  Denies unexplained bruising or bleeding.      Physical Exam:  No intake/output data recorded.  No intake or output data in the 24 hours ending 08/05/24 0649No intake/output data recorded.  Patient Vitals for the past 96 hrs (Last 3 readings):   Weight   08/04/24 2153 119.3 kg (263 lb)     Vital Signs:   Blood pressure 126/77, pulse 80, temperature 99.4 °F (37.4 °C), temperature source Oral, resp. rate 24, weight 119.3 kg (263 lb), SpO2 97 %.      General appearance:  Alert, responsive, oriented to person, place, and time.  Acute on chronic ill appearance, mildly uncomfortable, no distress.  Head:  Normocephalic. No masses, lesions or tenderness.  Eyes:  PERRLA.  EOMI.  Sclera clear.    ENT:  Ears normal. Mucosa normal.  Neck:    Supple. Trachea midline. No thyromegaly. No JVD. No bruits.  Heart:    Rhythm regular. Rate controlled.  S1 and S2.  Systolic murmur.  Lungs:    Symmetrical.  Diminished bibasilar air exchange.  Otherwise lungs are clear to auscultation bilaterally.  No wheezes. No rhonchi. No rales.  Abdomen:   Soft.  Obese. Non-tender. Non-distended. Bowel sounds positive. No organomegaly or masses.  No pain on palpation.  Extremities:    Peripheral pulses present.  No significant pitting peripheral edema.  No ulcers. No cyanosis. No clubbing.  Neurologic:    Alert x 3.  No focal deficit.  Cranial nerves grossly intact. No focal weakness.  Psych:   Behavior is normal. Mood appears normal. Speech is not rapid and/or pressured.  Musculoskeletal:   No unilateral joint edema, erythema or warmth. Gait not assessed.  Integumentary:  No rashes  Skin normal color and texture.  Genitalia/Breast:  Deferred      Medication:  Scheduled Meds:   cefTRIAXone (ROCEPHIN) IV  1,000 mg IntraVENous Q24H    insulin lispro  0-4 Units SubCUTAneous TID WC    insulin lispro  0-4 Units SubCUTAneous Nightly    azithromycin  500 mg IntraVENous Q24H     management plans with the patient and individuals accompanying the patient. I am readily available for any further decision-making and intervention.       WILL Hester CNP  8/5/2024  6:49 AM

## 2024-08-05 NOTE — PROGRESS NOTES
DVT Prophylaxis Adjustment Policy (DVT Prophylaxis)     This patient is on DVT Prophylaxis medication that requires a dose adjustment      Date Body Weight IBW  Adjusted BW SCr  CrCl Dialysis status   8/4/2024 119.3 kg (263 lb) Ideal body weight: 47.8 kg (105 lb 6.1 oz)  Adjusted ideal body weight: 76.4 kg (168 lb 6.8 oz) Serum creatinine: 1.1 mg/dL (H) 08/04/24 1142  Estimated creatinine clearance: 61 mL/min (A) N/a       Pharmacy has dose-adjusted the DVT Prophylaxis regimen to match   the recommendations from the following table        Ordered Medication:Lovenox 40mg daily    Order Changed/converted to: Lovenox 30mg BID      These changes were made per protocol according to the [facility] Pharmacist   Review for Appropriate Use and Automatic Dose Adjustments of   Subcutaneous Anticoagulants Policy     *Please note this dose may need readjusted if patient's condition changes.    Please contact pharmacy with any questions regarding these changes.    Ann Marie Velasquez PharmD 8/4/2024 9:56 PM  [facility]: [phone]

## 2024-08-05 NOTE — PROGRESS NOTES
4 Eyes Skin Assessment     NAME:  Lyla Ruiz  YOB: 1955  MEDICAL RECORD NUMBER:  [id number]    The patient is being assessed for  Admission    I agree that at least one RN has performed a thorough Head to Toe Skin Assessment on the patient. ALL assessment sites listed below have been assessed.      Areas assessed by both nurses:    Head, Face, Ears, Shoulders, Back, Chest, Arms, Elbows, Hands, Sacrum. Buttock, Coccyx, Ischium, Legs. Feet and Heels, and Under Medical Devices     Patient has excoriation under bilateral breasts and groin area and perirectal area. Scabbed area noted to lower extremity below  R knee. Also second toe L foot patient was clipped when getting nails trimmed, area bit red rash.         Does the Patient have a Wound? No noted wound(s)       Isauro Prevention initiated by RN: No  Wound Care Orders initiated by RN: No    Pressure Injury (Stage 3,4, Unstageable, DTI, NWPT, and Complex wounds) if present, place Wound referral order by RN under : No    New Ostomies, if present place, Ostomy referral order under : No     Nurse 1 eSignature: Electronically signed by Janna Jimenez RN on 8/5/24 at 10:08 AM EDT    **SHARE this note so that the co-signing nurse can place an eSignature**    Nurse 2 eSignature: Electronically signed by Lidia Ignacio RN on 8/5/24 at 10:56 AM EDT

## 2024-08-05 NOTE — H&P
Department of Internal Medicine  History and Physical    PCP: Sukumar Simms Jr., DO  Admitting Physician: Dr. Nova/Nathaniel  Consultants:   Date of Service: 8/4/2024    CHIEF COMPLAINT:  diarrhea    HISTORY OF PRESENT ILLNESS:    Patient is 68-year-old female who presented to the ED due to persistent loose stools.  Patient states that she had loose bowel movement earlier this morning.  In the ED she continues to have bowel movement.  States that she had about 5 since being seen in the ED.  She admits to associated fever.  She admits to nausea and emesis.  She does have bilateral lower extremity edema and erythema of right lower extremity.  She states that this is chronic.  States that she lives at home with family.  She uses a cane for assistance.      PAST MEDICAL Hx:  Past Medical History:   Diagnosis Date    Allergic rhinitis     Cerebrovascular disease 2012    Cerebrovascular disease 2014    Diabetes (HCC)     type 2    Hyperlipidemia     Hypertension     Irregular heart beat 2001    Myocardial infarct (HCC) 2014    Neuropathy     right foot    Obesity     Sleep apnea     declines to to wear a CPAP    Thyroid disease     hyperthyroid    Weakness due to cerebrovascular accident (CVA) 2014    on right side       PAST SURGICAL Hx:   Past Surgical History:   Procedure Laterality Date    CHOLECYSTECTOMY, LAPAROSCOPIC N/A 5/9/2022    LAPAROSCOPIC SUBTOTAL  CHOLECYSTECTOMY WITH PLACEMENT OF 19F CLOSED DRAIN performed by Delon JAMES MD [place] [facility] [place]    COLONOSCOPY  2000    COLONOSCOPY  2016    HYSTERECTOMY (CERVIX STATUS UNKNOWN)  2001    complete       FAMILY Hx:  Family History   Problem Relation Age of Onset    Diabetes Mother     High Blood Pressure Mother     High Cholesterol Mother     Heart Disease Father     Diabetes Father     High Blood Pressure Father     Diabetes Sister     Heart Disease Sister     High Blood Pressure Sister     High Blood Pressure Brother     Heart Disease Brother     Heart Attack    Hypertension  Hyperlipidemia        Patient presented to the ED with fever and malaise.  Patient also had episodes of nausea vomiting as well as loose stools.  Patient placed on IV antibiotics.  Patient placed on IV fluids.  She did ordered.  Patient also hypoxic.  She does history of sleep apnea but is planned chest x-ray did not show any acute process.  Continue to monitor pulse ox and wean off supplemental oxygen as tolerated      Geneva Mayfield DO  8:36 PM  8/4/2024    Electronically signed by Geneva Mayfield DO on 8/4/24 at 8:36 PM EDT

## 2024-08-06 LAB
ALBUMIN SERPL-MCNC: 3.1 G/DL (ref 3.5–5.2)
ALP SERPL-CCNC: 54 U/L (ref 35–104)
ALT SERPL-CCNC: 19 U/L (ref 0–32)
ANION GAP SERPL CALCULATED.3IONS-SCNC: 9 MMOL/L (ref 7–16)
AST SERPL-CCNC: 25 U/L (ref 0–31)
BASOPHILS # BLD: 0.06 K/UL (ref 0–0.2)
BASOPHILS NFR BLD: 1 % (ref 0–2)
BILIRUB SERPL-MCNC: 0.3 MG/DL (ref 0–1.2)
BUN SERPL-MCNC: 11 MG/DL (ref 6–23)
CALCIUM SERPL-MCNC: 8.1 MG/DL (ref 8.6–10.2)
CHLORIDE SERPL-SCNC: 106 MMOL/L (ref 98–107)
CO2 SERPL-SCNC: 25 MMOL/L (ref 22–29)
CREAT SERPL-MCNC: 1.1 MG/DL (ref 0.5–1)
EOSINOPHIL # BLD: 0.02 K/UL (ref 0.05–0.5)
EOSINOPHILS RELATIVE PERCENT: 0 % (ref 0–6)
ERYTHROCYTE [DISTWIDTH] IN BLOOD BY AUTOMATED COUNT: 14.4 % (ref 11.5–15)
GFR, ESTIMATED: 55 ML/MIN/1.73M2
GLUCOSE BLD-MCNC: 148 MG/DL (ref 74–99)
GLUCOSE BLD-MCNC: 192 MG/DL (ref 74–99)
GLUCOSE BLD-MCNC: 195 MG/DL (ref 74–99)
GLUCOSE BLD-MCNC: 207 MG/DL (ref 74–99)
GLUCOSE SERPL-MCNC: 146 MG/DL (ref 74–99)
HCT VFR BLD AUTO: 36.3 % (ref 34–48)
HGB BLD-MCNC: 11.6 G/DL (ref 11.5–15.5)
IMM GRANULOCYTES # BLD AUTO: 0.17 K/UL (ref 0–0.58)
IMM GRANULOCYTES NFR BLD: 2 % (ref 0–5)
LYMPHOCYTES NFR BLD: 1.18 K/UL (ref 1.5–4)
LYMPHOCYTES RELATIVE PERCENT: 13 % (ref 20–42)
MCH RBC QN AUTO: 29.7 PG (ref 26–35)
MCHC RBC AUTO-ENTMCNC: 32 G/DL (ref 32–34.5)
MCV RBC AUTO: 92.8 FL (ref 80–99.9)
MONOCYTES NFR BLD: 1.33 K/UL (ref 0.1–0.95)
MONOCYTES NFR BLD: 15 % (ref 2–12)
NEUTROPHILS NFR BLD: 69 % (ref 43–80)
NEUTS SEG NFR BLD: 6.26 K/UL (ref 1.8–7.3)
PLATELET # BLD AUTO: 220 K/UL (ref 130–450)
PMV BLD AUTO: 10.3 FL (ref 7–12)
POTASSIUM SERPL-SCNC: 3.4 MMOL/L (ref 3.5–5)
PROT SERPL-MCNC: 6.1 G/DL (ref 6.4–8.3)
RBC # BLD AUTO: 3.91 M/UL (ref 3.5–5.5)
SODIUM SERPL-SCNC: 140 MMOL/L (ref 132–146)
WBC OTHER # BLD: 9 K/UL (ref 4.5–11.5)

## 2024-08-06 PROCEDURE — 6370000000 HC RX 637 (ALT 250 FOR IP): Performed by: INTERNAL MEDICINE

## 2024-08-06 PROCEDURE — 6370000000 HC RX 637 (ALT 250 FOR IP): Performed by: NURSE PRACTITIONER

## 2024-08-06 PROCEDURE — 6360000002 HC RX W HCPCS: Performed by: NURSE PRACTITIONER

## 2024-08-06 PROCEDURE — 2580000003 HC RX 258: Performed by: NURSE PRACTITIONER

## 2024-08-06 PROCEDURE — 6370000000 HC RX 637 (ALT 250 FOR IP): Performed by: SPECIALIST

## 2024-08-06 PROCEDURE — 85025 COMPLETE CBC W/AUTO DIFF WBC: CPT

## 2024-08-06 PROCEDURE — 2580000003 HC RX 258: Performed by: INTERNAL MEDICINE

## 2024-08-06 PROCEDURE — 82962 GLUCOSE BLOOD TEST: CPT

## 2024-08-06 PROCEDURE — 36415 COLL VENOUS BLD VENIPUNCTURE: CPT

## 2024-08-06 PROCEDURE — 1200000000 HC SEMI PRIVATE

## 2024-08-06 PROCEDURE — 87040 BLOOD CULTURE FOR BACTERIA: CPT

## 2024-08-06 PROCEDURE — 6360000002 HC RX W HCPCS: Performed by: INTERNAL MEDICINE

## 2024-08-06 PROCEDURE — 80053 COMPREHEN METABOLIC PANEL: CPT

## 2024-08-06 RX ORDER — CLOTRIMAZOLE 1 %
CREAM (GRAM) TOPICAL 2 TIMES DAILY
Status: DISCONTINUED | OUTPATIENT
Start: 2024-08-06 | End: 2024-08-07 | Stop reason: HOSPADM

## 2024-08-06 RX ADMIN — CLOTRIMAZOLE: 10 CREAM TOPICAL at 19:44

## 2024-08-06 RX ADMIN — CLOTRIMAZOLE: 10 CREAM TOPICAL at 15:36

## 2024-08-06 RX ADMIN — ENOXAPARIN SODIUM 30 MG: 100 INJECTION SUBCUTANEOUS at 19:43

## 2024-08-06 RX ADMIN — GUAIFENESIN 1200 MG: 600 TABLET, EXTENDED RELEASE ORAL at 08:33

## 2024-08-06 RX ADMIN — AMLODIPINE BESYLATE 10 MG: 10 TABLET ORAL at 08:33

## 2024-08-06 RX ADMIN — WATER 1000 MG: 1 INJECTION INTRAMUSCULAR; INTRAVENOUS; SUBCUTANEOUS at 12:07

## 2024-08-06 RX ADMIN — ASPIRIN 325 MG: 325 TABLET, COATED ORAL at 08:33

## 2024-08-06 RX ADMIN — Medication 1 CAPSULE: at 08:32

## 2024-08-06 RX ADMIN — CHOLESTYRAMINE 4 G: 4 POWDER, FOR SUSPENSION ORAL at 08:32

## 2024-08-06 RX ADMIN — METOPROLOL TARTRATE 50 MG: 50 TABLET, FILM COATED ORAL at 08:32

## 2024-08-06 RX ADMIN — HYDRALAZINE HYDROCHLORIDE 25 MG: 25 TABLET ORAL at 08:33

## 2024-08-06 RX ADMIN — SODIUM CHLORIDE, POTASSIUM CHLORIDE, SODIUM LACTATE AND CALCIUM CHLORIDE: 600; 310; 30; 20 INJECTION, SOLUTION INTRAVENOUS at 00:49

## 2024-08-06 RX ADMIN — INSULIN LISPRO 1 UNITS: 100 INJECTION, SOLUTION INTRAVENOUS; SUBCUTANEOUS at 12:06

## 2024-08-06 RX ADMIN — GUAIFENESIN 1200 MG: 600 TABLET, EXTENDED RELEASE ORAL at 19:42

## 2024-08-06 RX ADMIN — SODIUM CHLORIDE, PRESERVATIVE FREE 10 ML: 5 INJECTION INTRAVENOUS at 19:44

## 2024-08-06 RX ADMIN — CHOLESTYRAMINE 4 G: 4 POWDER, FOR SUSPENSION ORAL at 19:43

## 2024-08-06 RX ADMIN — SODIUM CHLORIDE, POTASSIUM CHLORIDE, SODIUM LACTATE AND CALCIUM CHLORIDE: 600; 310; 30; 20 INJECTION, SOLUTION INTRAVENOUS at 14:45

## 2024-08-06 RX ADMIN — ISOSORBIDE MONONITRATE 30 MG: 30 TABLET, EXTENDED RELEASE ORAL at 08:33

## 2024-08-06 RX ADMIN — ENOXAPARIN SODIUM 30 MG: 100 INJECTION SUBCUTANEOUS at 08:32

## 2024-08-06 RX ADMIN — LOPERAMIDE HYDROCHLORIDE 2 MG: 2 CAPSULE ORAL at 06:12

## 2024-08-06 RX ADMIN — METRONIDAZOLE 500 MG: 500 INJECTION, SOLUTION INTRAVENOUS at 20:03

## 2024-08-06 RX ADMIN — METRONIDAZOLE 500 MG: 500 INJECTION, SOLUTION INTRAVENOUS at 14:53

## 2024-08-06 RX ADMIN — Medication 1 CAPSULE: at 17:33

## 2024-08-06 RX ADMIN — ATORVASTATIN CALCIUM 40 MG: 40 TABLET, FILM COATED ORAL at 08:33

## 2024-08-06 RX ADMIN — POTASSIUM CHLORIDE 40 MEQ: 1500 TABLET, EXTENDED RELEASE ORAL at 08:32

## 2024-08-06 RX ADMIN — METRONIDAZOLE 500 MG: 500 INJECTION, SOLUTION INTRAVENOUS at 06:12

## 2024-08-06 RX ADMIN — VANCOMYCIN HYDROCHLORIDE 2500 MG: 5 INJECTION, POWDER, LYOPHILIZED, FOR SOLUTION INTRAVENOUS at 11:01

## 2024-08-06 RX ADMIN — AZITHROMYCIN MONOHYDRATE 500 MG: 500 INJECTION, POWDER, LYOPHILIZED, FOR SOLUTION INTRAVENOUS at 07:11

## 2024-08-06 RX ADMIN — METOPROLOL TARTRATE 50 MG: 50 TABLET, FILM COATED ORAL at 19:42

## 2024-08-06 ASSESSMENT — PAIN SCALES - GENERAL: PAINLEVEL_OUTOF10: 0

## 2024-08-06 NOTE — CONSULTS
[address] [place] [address] [address] [place] [address]  Phone [phone]   [phone]      Admit Date: 2024  9:48 AM  Pt Name: Lyla Ruiz  MRN: [id number]  : 1955  Reason for Consult:    Chief Complaint   Patient presents with    Emesis    Diarrhea     Requesting Physician:  Davon Armstrong DO  PCP: Sukumar Simms Jr., DO  History Obtained From:  patient, chart   ID consulted for Hypoxemia [R09.02]  Acute respiratory failure with hypoxia (HCC) [J96.01]  Nausea vomiting and diarrhea [R11.2, R19.7]  on hospital day 2  CHIEF COMPLAINT       Chief Complaint   Patient presents with    Emesis    Diarrhea     HISTORYOF PRESENT ILLNESS   Lyla Ruiz is a 68 y.o. female who  has a past medical history of Allergic rhinitis, Cerebrovascular disease, Cerebrovascular disease, Diabetes (HCC), Hyperlipidemia, Hypertension, Irregular heart beat, Myocardial infarct (HCC), Neuropathy, Obesity, Sleep apnea, Thyroid disease, and Weakness due to cerebrovascular accident (CVA).   Presented to ED TRIAGE VITALS  BP: (!) 140/65, Temp: 99.9 °F (37.7 °C), Pulse: 89, Respirations: 18, SpO2: 90 %  HPI:  ID was consulted on 24 for infection management  KNOWN TO ID WAS JUST D/C ON  Leukocytosis wbc11.9  Fevers   transverse colon concerning for colitis had diarrhea   ?food poisoning   Rhabdomyolysis better  Denis better    Pt presented to ER on 2024 with diagnosis of  Hypoxemia [R09.02]  Acute respiratory failure with hypoxia (HCC) [J96.01]  Nausea vomiting and diarrhea [R11.2, R19.7]     PT  FROM HOME C/O SOB DIARRHEA NOW RESOLVED HAD FEVERS TEMP 101.1  TACHY   WBC13.7->9 CR1.1->1.1  BLOOD CX cons   CDIFF NEG      XR CHEST PORTABLE    Result Date: 2024  Low lung volumes with cardiomegaly. No acute cardiopulmonary disease.     CT ABDOMEN PELVIS W IV CONTRAST Additional Contrast? None    Result Date: 2024  1. Fluid identified of the colon to suggest clinical presentation of diarrhea. No evidence

## 2024-08-06 NOTE — ACP (ADVANCE CARE PLANNING)
Advance Care Planning   Healthcare Decision Maker:    Primary Decision Maker: Merrill Almanzar - Anish - [phone]    Primary Decision Maker: Dane Ruiz - Anish - [phone]

## 2024-08-06 NOTE — PROGRESS NOTES
Internal Medicine Progress Note     [facility]     Merrill Nova D.O., LEILAI.                         Davon Armstrong D.O., JOVANY.  Jai Burger D.O.     Lyla Lake, MSN, APRN, NP-C  John Reid, MSN, APRN-CNP  Adi Rashid, MSN, APRN, NP-C  Mansi Naqvi, MSN, APRN-CNP  Melita Rizvi, MSN, APRN, NP-C     Primary Care Physician: Sukumar Simms Jr., DO   Admitting Physician:  Davon Armstrong DO  Admission date and time: 8/4/2024  9:48 AM    Room:  [address] [place] [address]  Admitting diagnosis: Hypoxemia [R09.02]  Acute respiratory failure with hypoxia (HCC) [J96.01]  Nausea vomiting and diarrhea [R11.2, R19.7]    Patient Name: Lyla Ruiz  MRN: [id number]    Date of Service: 8/6/2024     Subjective:  Lyla is a 68 y.o. female who was seen and examined today,8/6/2024, at the bedside.  She is doing better overall.  She reports less diarrhea.  We have reviewed the results of the workup and plan of care forward including positive blood cultures with plan for repeat as we suspect contamination.  She voices no additional complaints or concerns.  No family present during my examination.    Review of systems:  Constitutional:   + fatigue and malaise , - intermittent fever/chills  HEENT:   Denies ear pain, sore throat, sinus or eye problems.  Cardiovascular:   - chest pain, irregular heartbeats, or palpitations.   Respiratory:   - dyspnea at rest, - dyspnea on exertion, - coughing, - sputum, - hemoptysis  Gastrointestinal:   - nausea, -vomiting. + significantly improved diarrhea/bowel pattern changes. - hematochezia. - melena. + poor appetite and poor intake. - further colicky abdominal pain.  Genitourinary:    Denies any urgency, frequency, hematuria. Voiding  without difficulty.  Extremities:   Denies lower extremity swelling, edema or cyanosis.   Neurology:    Denies any headache or focal neurological deficits, positive for generalized weakness and fatigue without focal  texture.  Genitalia/Breast:  Deferred      Medication:  Scheduled Meds:   cefTRIAXone (ROCEPHIN) IV  1,000 mg IntraVENous Q24H    insulin lispro  0-4 Units SubCUTAneous TID WC    insulin lispro  0-4 Units SubCUTAneous Nightly    azithromycin  500 mg IntraVENous Q24H    lactobacillus  1 capsule Oral BID     guaiFENesin  1,200 mg Oral BID    atorvastatin  40 mg Oral Daily    hydrALAZINE  25 mg Oral Daily    cholestyramine  1 packet Oral BID    sodium chloride flush  5-40 mL IntraVENous 2 times per day    enoxaparin  30 mg SubCUTAneous BID    amLODIPine  10 mg Oral Daily    aspirin  325 mg Oral Daily    isosorbide mononitrate  30 mg Oral Daily    metoprolol tartrate  50 mg Oral BID    metroNIDAZOLE  500 mg IntraVENous Q8H     Continuous Infusions:   dextrose      lactated ringers IV soln 75 mL/hr at 08/06/24 0049    sodium chloride         Objective Data:  CBC with Differential:    Lab Results   Component Value Date/Time    WBC 9.0 08/06/2024 06:12 AM    RBC 3.91 08/06/2024 06:12 AM    HGB 11.6 08/06/2024 06:12 AM    HCT 36.3 08/06/2024 06:12 AM     08/06/2024 06:12 AM    MCV 92.8 08/06/2024 06:12 AM    MCH 29.7 08/06/2024 06:12 AM    MCHC 32.0 08/06/2024 06:12 AM    RDW 14.4 08/06/2024 06:12 AM    NRBC 1 07/16/2024 08:42 AM    METASPCT 1 08/05/2024 06:15 AM    METASPCT 0.9 05/16/2022 06:10 PM    LYMPHOPCT 13 08/06/2024 06:12 AM    MONOPCT 15 08/06/2024 06:12 AM    MYELOPCT 1 08/05/2024 06:15 AM    EOSPCT 0 08/06/2024 06:12 AM    BASOPCT 1 08/06/2024 06:12 AM    MONOSABS 1.33 08/06/2024 06:12 AM    LYMPHSABS 1.18 08/06/2024 06:12 AM    EOSABS 0.02 08/06/2024 06:12 AM    BASOSABS 0.06 08/06/2024 06:12 AM     CMP:    Lab Results   Component Value Date/Time     08/06/2024 06:12 AM    K 3.4 08/06/2024 06:12 AM    K 4.0 05/16/2022 06:10 PM     08/06/2024 06:12 AM    CO2 25 08/06/2024 06:12 AM    BUN 11 08/06/2024 06:12 AM    CREATININE 1.1 08/06/2024 06:12 AM    GFRAA >60 05/16/2022 06:10 PM    LABGLOM

## 2024-08-06 NOTE — PROGRESS NOTES
Pharmacy Consultation Note  (Antibiotic Dosing and Monitoring)    Initial consult date: 8/6/24  Consulting physician/provider: Robbin  Drug: Vancomycin  Indication: bloodstream infection    Age/  Gender Height Weight IBW  Allergy Information   68 y.o./female 156.2 cm (5' 1.5\") 119.3 kg (263 lb)     Ideal body weight: 48.9 kg (107 lb 14.6 oz)  Adjusted ideal body weight: 75.1 kg (165 lb 8.8 oz)   Latex, Doxycycline, Seasonal, and Adhesive tape      Renal Function:  Recent Labs     08/04/24  1142 08/05/24  0615 08/06/24  0612   BUN 12 12 11   CREATININE 1.1* 1.2* 1.1*     No intake or output data in the 24 hours ending 08/06/24 1000    Vancomycin Monitoring:  Trough:  No results for input(s): \"VANCOTROUGH\" in the last 72 hours.  Random:  No results for input(s): \"VANCORANDOM\" in the last 72 hours.    Vancomycin Administration Times:  Recent vancomycin administrations        No vancomycin IV orders with administrations found.            Orders not given:            vancomycin (VANCOCIN) 2,500 mg in sodium chloride 0.9 % 500 mL IVPB                    Assessment:  Patient is a 68 y.o. female who has been initiated on vancomycin  Estimated Creatinine Clearance: 60 mL/min (A) (based on SCr of 1.1 mg/dL (H)).  To dose vancomycin, pharmacy will be utilizing [employer] calculation software for goal AUC/TRAVIS 400-600 mg/L-hr (predicted AUC/TRAVIS = 472, Tr =12 mcg/mL)  8/6: SCr=1.1, baseline~1.2    Plan:  Vancomycin 2500 mg IV x 1 dose, followed by trial regimen of vancomycin 1000 mg every 24  hours, predicted AUC/JBI=076lb/L.h, Tr=12 mcg/mL  Will check vancomycin levels when appropriate  Will continue to monitor renal function   Pharmacy to follow      Leanna Goodman RPH 8/6/2024 10:00 AM  SJW: [phone]

## 2024-08-07 VITALS
TEMPERATURE: 98.9 F | OXYGEN SATURATION: 93 % | HEART RATE: 84 BPM | BODY MASS INDEX: 46.38 KG/M2 | HEIGHT: 62 IN | SYSTOLIC BLOOD PRESSURE: 152 MMHG | RESPIRATION RATE: 18 BRPM | DIASTOLIC BLOOD PRESSURE: 78 MMHG | WEIGHT: 252 LBS

## 2024-08-07 LAB
ACB COMPLEX DNA BLD POS QL NAA+NON-PROBE: NOT DETECTED
ALBUMIN SERPL-MCNC: 3.4 G/DL (ref 3.5–5.2)
ALP SERPL-CCNC: 84 U/L (ref 35–104)
ALT SERPL-CCNC: 13 U/L (ref 0–32)
ANION GAP SERPL CALCULATED.3IONS-SCNC: 11 MMOL/L (ref 7–16)
AST SERPL-CCNC: 21 U/L (ref 0–31)
B FRAGILIS DNA BLD POS QL NAA+NON-PROBE: NOT DETECTED
BASOPHILS # BLD: 0.1 K/UL (ref 0–0.2)
BASOPHILS NFR BLD: 1 % (ref 0–2)
BILIRUB SERPL-MCNC: 0.4 MG/DL (ref 0–1.2)
BIOFIRE TEST COMMENT: ABNORMAL
BUN SERPL-MCNC: 9 MG/DL (ref 6–23)
C ALBICANS DNA BLD POS QL NAA+NON-PROBE: NOT DETECTED
C AURIS DNA BLD POS QL NAA+NON-PROBE: NOT DETECTED
C GATTII+NEOFOR DNA BLD POS QL NAA+N-PRB: NOT DETECTED
C GLABRATA DNA BLD POS QL NAA+NON-PROBE: NOT DETECTED
C KRUSEI DNA BLD POS QL NAA+NON-PROBE: NOT DETECTED
C PARAP DNA BLD POS QL NAA+NON-PROBE: NOT DETECTED
C TROPICLS DNA BLD POS QL NAA+NON-PROBE: NOT DETECTED
CALCIUM SERPL-MCNC: 7.9 MG/DL (ref 8.6–10.2)
CHLORIDE SERPL-SCNC: 103 MMOL/L (ref 98–107)
CO2 SERPL-SCNC: 21 MMOL/L (ref 22–29)
CREAT SERPL-MCNC: 0.8 MG/DL (ref 0.5–1)
E CLOAC COMP DNA BLD POS NAA+NON-PROBE: NOT DETECTED
E COLI DNA BLD POS QL NAA+NON-PROBE: NOT DETECTED
E FAECALIS DNA BLD POS QL NAA+NON-PROBE: NOT DETECTED
E FAECIUM DNA BLD POS QL NAA+NON-PROBE: NOT DETECTED
ENTEROBACTERALES DNA BLD POS NAA+N-PRB: NOT DETECTED
EOSINOPHIL # BLD: 0.12 K/UL (ref 0.05–0.5)
EOSINOPHILS RELATIVE PERCENT: 1 % (ref 0–6)
ERYTHROCYTE [DISTWIDTH] IN BLOOD BY AUTOMATED COUNT: 14.4 % (ref 11.5–15)
GFR, ESTIMATED: 82 ML/MIN/1.73M2
GLUCOSE BLD-MCNC: 234 MG/DL (ref 74–99)
GLUCOSE BLD-MCNC: 279 MG/DL (ref 74–99)
GLUCOSE SERPL-MCNC: 249 MG/DL (ref 74–99)
GP B STREP DNA BLD POS QL NAA+NON-PROBE: NOT DETECTED
HAEM INFLU DNA BLD POS QL NAA+NON-PROBE: NOT DETECTED
HCT VFR BLD AUTO: 35.4 % (ref 34–48)
HGB BLD-MCNC: 11.2 G/DL (ref 11.5–15.5)
IMM GRANULOCYTES # BLD AUTO: 0.41 K/UL (ref 0–0.58)
IMM GRANULOCYTES NFR BLD: 4 % (ref 0–5)
K OXYTOCA DNA BLD POS QL NAA+NON-PROBE: NOT DETECTED
KLEBSIELLA SP DNA BLD POS QL NAA+NON-PRB: NOT DETECTED
KLEBSIELLA SP DNA BLD POS QL NAA+NON-PRB: NOT DETECTED
L MONOCYTOG DNA BLD POS QL NAA+NON-PROBE: NOT DETECTED
LYMPHOCYTES NFR BLD: 1.04 K/UL (ref 1.5–4)
LYMPHOCYTES RELATIVE PERCENT: 9 % (ref 20–42)
MCH RBC QN AUTO: 28.9 PG (ref 26–35)
MCHC RBC AUTO-ENTMCNC: 31.6 G/DL (ref 32–34.5)
MCV RBC AUTO: 91.2 FL (ref 80–99.9)
MECA+MECC ISLT/SPM QL: DETECTED
MICROORGANISM SPEC CULT: ABNORMAL
MICROORGANISM/AGENT SPEC: ABNORMAL
MONOCYTES NFR BLD: 1.11 K/UL (ref 0.1–0.95)
MONOCYTES NFR BLD: 10 % (ref 2–12)
N MEN DNA BLD POS QL NAA+NON-PROBE: NOT DETECTED
NEUTROPHILS NFR BLD: 76 % (ref 43–80)
NEUTS SEG NFR BLD: 8.81 K/UL (ref 1.8–7.3)
P AERUGINOSA DNA BLD POS NAA+NON-PROBE: NOT DETECTED
PLATELET # BLD AUTO: 260 K/UL (ref 130–450)
PMV BLD AUTO: 9.8 FL (ref 7–12)
POTASSIUM SERPL-SCNC: 3.5 MMOL/L (ref 3.5–5)
PROT SERPL-MCNC: 6.4 G/DL (ref 6.4–8.3)
PROTEUS SP DNA BLD POS QL NAA+NON-PROBE: NOT DETECTED
RBC # BLD AUTO: 3.88 M/UL (ref 3.5–5.5)
S AUREUS DNA BLD POS QL NAA+NON-PROBE: NOT DETECTED
S AUREUS+CONS DNA BLD POS NAA+NON-PROBE: DETECTED
S EPIDERMIDIS DNA BLD POS QL NAA+NON-PRB: DETECTED
S LUGDUNENSIS DNA BLD POS QL NAA+NON-PRB: NOT DETECTED
S MALTOPHILIA DNA BLD POS QL NAA+NON-PRB: NOT DETECTED
S MARCESCENS DNA BLD POS NAA+NON-PROBE: NOT DETECTED
S PNEUM DNA BLD POS QL NAA+NON-PROBE: NOT DETECTED
S PYO DNA BLD POS QL NAA+NON-PROBE: NOT DETECTED
SALMONELLA DNA BLD POS QL NAA+NON-PROBE: NOT DETECTED
SERVICE CMNT-IMP: ABNORMAL
SODIUM SERPL-SCNC: 135 MMOL/L (ref 132–146)
SPECIMEN DESCRIPTION: ABNORMAL
STREPTOCOCCUS DNA BLD POS NAA+NON-PROBE: NOT DETECTED
WBC OTHER # BLD: 11.6 K/UL (ref 4.5–11.5)

## 2024-08-07 PROCEDURE — 36415 COLL VENOUS BLD VENIPUNCTURE: CPT

## 2024-08-07 PROCEDURE — 6360000002 HC RX W HCPCS: Performed by: NURSE PRACTITIONER

## 2024-08-07 PROCEDURE — 6360000002 HC RX W HCPCS: Performed by: INTERNAL MEDICINE

## 2024-08-07 PROCEDURE — 82962 GLUCOSE BLOOD TEST: CPT

## 2024-08-07 PROCEDURE — 2580000003 HC RX 258: Performed by: INTERNAL MEDICINE

## 2024-08-07 PROCEDURE — 6370000000 HC RX 637 (ALT 250 FOR IP): Performed by: INTERNAL MEDICINE

## 2024-08-07 PROCEDURE — P9047 ALBUMIN (HUMAN), 25%, 50ML: HCPCS | Performed by: NURSE PRACTITIONER

## 2024-08-07 PROCEDURE — 85025 COMPLETE CBC W/AUTO DIFF WBC: CPT

## 2024-08-07 PROCEDURE — 2580000003 HC RX 258: Performed by: NURSE PRACTITIONER

## 2024-08-07 PROCEDURE — 80053 COMPREHEN METABOLIC PANEL: CPT

## 2024-08-07 PROCEDURE — 6370000000 HC RX 637 (ALT 250 FOR IP): Performed by: NURSE PRACTITIONER

## 2024-08-07 RX ORDER — TORSEMIDE 20 MG/1
20 TABLET ORAL DAILY
Qty: 30 TABLET | Refills: 3
Start: 2024-08-08

## 2024-08-07 RX ORDER — AZITHROMYCIN 500 MG/1
500 TABLET, FILM COATED ORAL
Qty: 5 TABLET | Refills: 0 | Status: SHIPPED | OUTPATIENT
Start: 2024-08-07 | End: 2024-08-12

## 2024-08-07 RX ORDER — FUROSEMIDE 10 MG/ML
40 INJECTION INTRAMUSCULAR; INTRAVENOUS ONCE
Status: COMPLETED | OUTPATIENT
Start: 2024-08-07 | End: 2024-08-07

## 2024-08-07 RX ORDER — GUAIFENESIN 600 MG/1
600 TABLET, EXTENDED RELEASE ORAL 2 TIMES DAILY
Qty: 28 TABLET | Refills: 0 | Status: SHIPPED | OUTPATIENT
Start: 2024-08-07 | End: 2024-08-21

## 2024-08-07 RX ORDER — LACTOBACILLUS RHAMNOSUS GG 10B CELL
1 CAPSULE ORAL 2 TIMES DAILY WITH MEALS
Qty: 60 CAPSULE | Refills: 1 | Status: SHIPPED | OUTPATIENT
Start: 2024-08-07

## 2024-08-07 RX ORDER — LOPERAMIDE HYDROCHLORIDE 2 MG/1
2 CAPSULE ORAL 4 TIMES DAILY PRN
Qty: 28 CAPSULE | Refills: 0 | Status: SHIPPED | OUTPATIENT
Start: 2024-08-07 | End: 2024-08-14

## 2024-08-07 RX ORDER — ALBUMIN (HUMAN) 12.5 G/50ML
50 SOLUTION INTRAVENOUS ONCE
Status: COMPLETED | OUTPATIENT
Start: 2024-08-07 | End: 2024-08-07

## 2024-08-07 RX ORDER — CHOLESTYRAMINE 4 G/9G
1 POWDER, FOR SUSPENSION ORAL 2 TIMES DAILY
Qty: 90 PACKET | Refills: 3 | Status: SHIPPED | OUTPATIENT
Start: 2024-08-07

## 2024-08-07 RX ADMIN — CHOLESTYRAMINE 4 G: 4 POWDER, FOR SUSPENSION ORAL at 09:13

## 2024-08-07 RX ADMIN — SODIUM CHLORIDE, PRESERVATIVE FREE 10 ML: 5 INJECTION INTRAVENOUS at 09:18

## 2024-08-07 RX ADMIN — CLOTRIMAZOLE: 10 CREAM TOPICAL at 09:20

## 2024-08-07 RX ADMIN — GUAIFENESIN 1200 MG: 600 TABLET, EXTENDED RELEASE ORAL at 09:12

## 2024-08-07 RX ADMIN — AMLODIPINE BESYLATE 10 MG: 10 TABLET ORAL at 09:13

## 2024-08-07 RX ADMIN — INSULIN LISPRO 1 UNITS: 100 INJECTION, SOLUTION INTRAVENOUS; SUBCUTANEOUS at 12:49

## 2024-08-07 RX ADMIN — SODIUM CHLORIDE, POTASSIUM CHLORIDE, SODIUM LACTATE AND CALCIUM CHLORIDE: 600; 310; 30; 20 INJECTION, SOLUTION INTRAVENOUS at 02:54

## 2024-08-07 RX ADMIN — ALBUMIN (HUMAN) 50 G: 0.25 INJECTION, SOLUTION INTRAVENOUS at 09:27

## 2024-08-07 RX ADMIN — METRONIDAZOLE 500 MG: 500 INJECTION, SOLUTION INTRAVENOUS at 05:45

## 2024-08-07 RX ADMIN — HYDRALAZINE HYDROCHLORIDE 25 MG: 25 TABLET ORAL at 09:13

## 2024-08-07 RX ADMIN — Medication 1 CAPSULE: at 09:19

## 2024-08-07 RX ADMIN — ISOSORBIDE MONONITRATE 30 MG: 30 TABLET, EXTENDED RELEASE ORAL at 09:12

## 2024-08-07 RX ADMIN — INSULIN LISPRO 2 UNITS: 100 INJECTION, SOLUTION INTRAVENOUS; SUBCUTANEOUS at 09:19

## 2024-08-07 RX ADMIN — ATORVASTATIN CALCIUM 40 MG: 40 TABLET, FILM COATED ORAL at 09:12

## 2024-08-07 RX ADMIN — ENOXAPARIN SODIUM 30 MG: 100 INJECTION SUBCUTANEOUS at 09:18

## 2024-08-07 RX ADMIN — METOPROLOL TARTRATE 50 MG: 50 TABLET, FILM COATED ORAL at 09:12

## 2024-08-07 RX ADMIN — FUROSEMIDE 40 MG: 10 INJECTION, SOLUTION INTRAMUSCULAR; INTRAVENOUS at 09:19

## 2024-08-07 RX ADMIN — WATER 1000 MG: 1 INJECTION INTRAMUSCULAR; INTRAVENOUS; SUBCUTANEOUS at 12:48

## 2024-08-07 RX ADMIN — ASPIRIN 325 MG: 325 TABLET, COATED ORAL at 09:12

## 2024-08-07 NOTE — PROGRESS NOTES
CLINICAL PHARMACY NOTE: MEDS TO BEDS    Total # of Prescriptions Filled: 5   The following medications were delivered to the patient:  Cholestyramine 4 gm packets  Azithromycin 500 mg  Loperamide 2 mg  [facility] caps  Mucinex 600 mg    Additional Documentation:

## 2024-08-07 NOTE — DISCHARGE SUMMARY
Internal Medicine Progress Note     [facility]     Merrill Nova D.O., SHAHBAZ Armstrong D.O., SHAHBAZ Burger D.O.     Lyla Lake, MSN, APRN, NP-C  John Reid, MSN, APRN-CNP  Adi Rashid, MSN, APRN, NP-C  Mansi Naqvi, MSN, APRN-CNP  Melita Rizvi, MSN, APRN, NP-C       Internal Medicine  Discharge Summary    NAME: Lyla Ruiz  :  1955  MRN:  [id number]  PCP:Sukumar Simms Jr., DO  ADMITTED: 2024      DISCHARGED: 24    ADMITTING PHYSICIAN: Davon Armstrong DO    CONSULTANT(S):   IP CONSULT TO PHARMACY  IP CONSULT TO INFECTIOUS DISEASES     ADMITTING DIAGNOSIS:   Hypoxemia [R09.02]  Acute respiratory failure with hypoxia (HCC) [J96.01]  Nausea vomiting and diarrhea [R11.2, R19.7]     DISCHARGE DIAGNOSES:   Sepsis secondary to atypical pneumonia and diarrheal illness, with entirely negative infectious workup  Acute respiratory failure with hypoxia secondary to atypical pneumonia +/- atelectasis, improved but requiring nasal cannula oxygen upon discharge  Persistent diarrhea presumed infectious following treatment for pancolitis during last admission  CONS + BC due to skin contamination  Coronary artery disease with mildly elevated troponin on presentation suggesting demand ischemia  Essential hypertension  Chronic kidney disease stage IV-V  Non-insulin-dependent diabetes mellitus type 2  Gastroesophageal reflux disease  Hyperlipidemia  Morbid obesity    BRIEF HISTORY OF PRESENT ILLNESS:   Patient is 68-year-old female who presented to the ED due to persistent loose stools.  Patient states that she had loose bowel movement earlier this morning.  In the ED she continues to have bowel movement.  States that she had about 5 since being seen in the ED.  She admits to associated fever.  She admits to nausea and emesis.  She does have bilateral lower extremity edema and erythema of right lower extremity.  She states  management.  They are stable for discharge with outpatient follow-up.    I have spoken with the patient and discussed the results of the current hospitalization, in addition to providing specific details for the plan of care and counseling regarding the diagnosis and prognosis.  The plan has been discussed in detail and they are aware of the specific conditions for emergent return, as well as the importance of follow-up.  Their questions are answered at this time and they are agreeable with the plan for discharge to home    DISCHARGE MEDICATIONS:   Current Discharge Medication List             Details   lactobacillus (CULTURELLE) capsule Take 1 capsule by mouth 2 times daily (with meals)  Qty: 60 capsule, Refills: 1      loperamide (IMODIUM) 2 MG capsule Take 1 capsule by mouth 4 times daily as needed for Diarrhea  Qty: 28 capsule, Refills: 0      cholestyramine (QUESTRAN) 4 g packet Take 1 packet by mouth 2 times daily  Qty: 90 packet, Refills: 3      guaiFENesin (MUCINEX) 600 MG extended release tablet Take 1 tablet by mouth 2 times daily for 14 days  Qty: 28 tablet, Refills: 0      azithromycin (ZITHROMAX) 500 MG tablet Take 1 tablet by mouth daily (with breakfast) for 5 days  Qty: 5 tablet, Refills: 0                Details   torsemide (DEMADEX) 20 MG tablet Take 1 tablet by mouth daily  Qty: 30 tablet, Refills: 3                Details   empagliflozin (JARDIANCE) 10 MG tablet Take 1 tablet by mouth daily      VITAMIN E PO Take 1 capsule by mouth daily      KRILL OIL PO Take 1 capsule by mouth Daily      hydrALAZINE (APRESOLINE) 25 MG tablet Take 1 tablet by mouth every 8 hours  Qty: 90 tablet, Refills: 3      isosorbide mononitrate (IMDUR) 30 MG extended release tablet Take 1 tablet by mouth daily  Qty: 30 tablet, Refills: 3      potassium chloride (KLOR-CON M) 20 MEQ extended release tablet Take 1 tablet by mouth daily  Qty: 90 tablet, Refills: 1      metoprolol (LOPRESSOR) 50 MG tablet Take 1 tablet by mouth

## 2024-08-07 NOTE — DISCHARGE INSTRUCTIONS
Your information:  Name: Lyla Ruiz  : 1955    Your instructions:    YOU ARE BEING DISCHARGED HOME. PLEASE MAKE AND KEEP YOUR FOLLOW UP APPOINTMENTS.     What to do after you leave the hospital:    Recommended diet: 4 carb choices    Recommended activity: activity as tolerated      HOME OXYGEN-call [facility] ([phone]) when you arrive home for home oxygen delivery      The following personal items were collected during your admission and were returned to you:    Belongings  Dental Appliances: None  Vision - Corrective Lenses: None  Hearing Aid: None  Clothing: Other (Comment) (none)  Jewelry: None  Body Piercings Removed: No  Electronic Devices: Cell Phone  Weapons (Notify Protective Services/Security): None  Other Valuables: At home  Home Medications: None  Valuables Given To: Other (Comment) (na)  Provide Name(s) of Who Valuable(s) Were Given To: none    Information obtained by:  By signing below, I understand that if any problems occur once I leave the hospital I am to contact Dr. Simms.  I understand and acknowledge receipt of the instructions indicated above.

## 2024-08-07 NOTE — PLAN OF CARE
Problem: Skin/Tissue Integrity  Goal: Absence of new skin breakdown  Description: 1.  Monitor for areas of redness and/or skin breakdown  2.  Assess vascular access sites hourly  3.  Every 4-6 hours minimum:  Change oxygen saturation probe site  4.  Every 4-6 hours:  If on nasal continuous positive airway pressure, respiratory therapy assess nares and determine need for appliance change or resting period.  8/7/2024 1223 by Mateus Abraham, RN  Outcome: Progressing     Problem: Safety - Adult  Goal: Free from fall injury  8/7/2024 1223 by Mateus Abraham, RN  Outcome: Progressing

## 2024-08-07 NOTE — PROGRESS NOTES
Pulse ox was 88% on room air at rest.  Oxygen applied.  Recovery pulse ox was 91% on 2 liters of oxygen.

## 2024-08-07 NOTE — PROGRESS NOTES
· Baseline creatinine 1 8-2 2  Off Bumex infusion and metolazone today due to worsening renal function with azotemia  · Likely secondary to cardiorenal, +/-obstructive uropathy  · He is status post viera placed for retention during this admission and flomax added  Viera removed on 4/12/18 for voiding trial which so far has been successful    Continue urinary retention protocol  · Management per Nephrology GROWTH 2 DAYS    Respiratory Panel, Molecular, with COVID-19 (Restricted: peds pts or suitable admitted adults) [[id number]] Collected: 08/04/24 1432    Order Status: Completed Specimen: Nasopharyngeal Swab Updated: 08/05/24 1133     Specimen Description .NASOPHARYNGEAL SWAB     Adenovirus PCR Not Detected     Coronavirus 229E PCR Not Detected     Coronavirus HKU1 PCR Not Detected     Coronavirus NL63 PCR Not Detected     Coronavirus OC43 PCR Not Detected     SARS-CoV-2, PCR Not Detected     Human Metapneumovirus PCR Not Detected     Rhino/Enterovirus PCR Not Detected     Influenza A by PCR Not Detected     Influenza B by PCR Not Detected     Parainfluenza 1 PCR Not Detected     Parainfluenza 2 PCR Not Detected     Parainfluenza 3 PCR Not Detected     Parainfluenza 4 PCR Not Detected     Resp Syncytial Virus PCR Not Detected     Bordetella parapertussis by PCR Not Detected     B Pertussis by PCR Not Detected     Chlamydia pneumoniae By PCR Not Detected     Mycoplasma pneumo by PCR Not Detected     Comment: Performed by multiplexed nucleic acid assay.             Recent Labs     08/05/24  0615 08/06/24  0612 08/07/24  1002   SEDRATE 35*  --   --    .0*  --   --    PROCAL 0.54*  --   --    AST 32* 25 21   ALT 18 19 13     Lab Results   Component Value Date/Time    CHOL 97 07/12/2024 05:02 AM    TRIG 132 07/12/2024 05:02 AM    HDL 31 07/12/2024 05:02 AM     Lab Results   Component Value Date/Time    VITD25 33.8 07/12/2024 05:02 AM     Recent Labs     08/05/24  0615 08/06/24  0612 08/07/24  1002   WBC 7.2 9.0 11.6*   HGB 12.6 11.6 11.2*   HCT 40.5 36.3 35.4    220 260   MCV 94.0 92.8 91.2   MCH 29.2 29.7 28.9   MCHC 31.1* 32.0 31.6*   RDW 14.4 14.4 14.4   METASPCT 1  --   --    LYMPHOPCT 5* 13* 9*   MONOPCT 8 15* 10   MYELOPCT 1*  --   --    EOSPCT 0 0 1   BASOPCT 0 1 1   MONOSABS 0.56 1.33* 1.11*   LYMPHSABS 0.38* 1.18* 1.04*   EOSABS 0.00* 0.02* 0.12   BASOSABS 0.00 0.06 0.10     Recent Labs      [phone]

## 2024-08-07 NOTE — PLAN OF CARE
Problem: Skin/Tissue Integrity  Goal: Absence of new skin breakdown  Description: 1.  Monitor for areas of redness and/or skin breakdown  2.  Assess vascular access sites hourly  3.  Every 4-6 hours minimum:  Change oxygen saturation probe site  4.  Every 4-6 hours:  If on nasal continuous positive airway pressure, respiratory therapy assess nares and determine need for appliance change or resting period.  Outcome: Progressing     Problem: ABCDS Injury Assessment  Goal: Absence of physical injury  Outcome: Progressing     Problem: Safety - Adult  Goal: Free from fall injury  Outcome: Progressing     Problem: Pain  Goal: Verbalizes/displays adequate comfort level or baseline comfort level  Outcome: Progressing     Problem: Chronic Conditions and Co-morbidities  Goal: Patient's chronic conditions and co-morbidity symptoms are monitored and maintained or improved  Outcome: Progressing

## 2024-08-07 NOTE — CARE COORDINATION
8/7/2024 1118 CM note: Pt resides with her sister, brother and nephews in a 2 story home, 1st level living.  She is wearing o2@2L NC(, qualified for home o2 and prefers [employer]. [employer] liaison Chaz received orders and notified of d/c order. [employer] will deliver portable o2 tank to pt's room and arrange for home o2 delivery. Pt relays she will be returning to her home in [place] and family will transport. Zithromax rx noted. Jair CONTRERAS   
Patients PCP office will be contacting her to schedule a follow-up appointment.  
Primary Decision Maker: Merrill Almanzar - Child - [phone]    Primary Decision Maker: Dane Ruiz - Child - [phone]    Discharge Planning:    Patient lives with: Family Members Type of Home: House  Primary Care Giver: Other (Comment) (see CM note)  Patient Support Systems include: Family Members, Children   Current Financial resources:    Current community resources:    Current services prior to admission: None            Current DME:              Type of Home Care services:  None    ADLS  Prior functional level: Assistance with the following:, Cooking, Housework, Shopping  Current functional level: Assistance with the following:, Cooking, Housework, Shopping    PT AM-PAC:   /24  OT AM-PAC:   /24    Family can provide assistance at DC: Yes  Would you like Case Management to discuss the discharge plan with any other family members/significant others, and if so, who? No  Plans to Return to Present Housing: Other (see comment) (see CM note)    Potential Assistance needed at discharge: N/A            Potential DME:    Patient expects to discharge to: House  Plan for transportation at discharge:      Financial    Payor: [facility] MEDICARE / Plan: [employer] ESSENTIAL/PLUS / Product Type: *No Product type* /     Does insurance require precert for SNF: Yes    Potential assistance Purchasing Medications:    Meds-to-Beds request:        [id number] - [address] -  [phone] - F [phone]  [address] [place] [address]  Phone: [phone] Fax: [phone]      Notes:    Factors facilitating achievement of predicted outcomes: Family support, pleasant      Additional Case Management Notes: 8/6/2024 1302 CM note: Met with patient for transition of care needs. Pt is pleasant and resides with her sister, brother and nephews in a 2 story home, 1st level living, 5STE. She relays her sister assists with personal care as needed and family provide, shopping, cooking, cleaning

## 2024-08-07 NOTE — PROGRESS NOTES
Pharmacy Consultation Note  (Antibiotic Dosing and Monitoring)    Vancomycin has been discontinued; pharmacy will sign-off.  Please reconsult if needed.    Thank you,  Ann Marie Velasquez, PharmD 8/7/2024 9:22 AM  MARIAH: [phone]

## 2024-08-07 NOTE — PROGRESS NOTES
Pharmacy Discharge Reconciliation & Education    Counseled patient on the importance of adherence with new medications. Patient was accompanied by sister . Obtained permission from patient to discuss drug regimen with visitor(s) present. New medication(s), Cholestyramine, Azithromycin, loperamide, Culturelle and Mucinex.  Discussed the purpose of each medication, as well as the dose, frequency, and common side effects/mitigation strategies.  All questions asked were answered or deferred to the inpatient team providers. Patient encouraged to reach out with any other additional questions or concerns that they may have.  Patient verbalized understanding.    Juliet Garcia, PharmD.  8/7/2024 3:06 PM    SJW: [phone]

## 2024-08-09 LAB
MICROORGANISM SPEC CULT: NORMAL
SERVICE CMNT-IMP: NORMAL
SPECIMEN DESCRIPTION: NORMAL

## 2024-08-09 NOTE — PROGRESS NOTES
Physician Progress Note      PATIENT:               RUFUS SHARIF  CSN #:                  [id number]  :                       1955  ADMIT DATE:       2024 9:48 AM  DISCH DATE:        2024 4:26 PM  RESPONDING  PROVIDER #:        Davon Amrstrong DO          QUERY TEXT:    Patient admitted with acute hypoxic respiratory failure. Dr. Armstrong progress   note 24  \"Sepsis secondary to atypical pneumonia and diarrheal illness.\"     Patient noted to have pulse 122,  WBC 13.7, Lactic acid  3.2,    Procalcitonin 0.54 on admission. If possible, please document in progress   notes and discharge summary the present on admission status of Sepsis:    The medical record reflects the following:  Risk Factors: 68 yof, Acute hypoxic respiratory failure, Pneumonia, Persistent   diarrhea presumed infectious  Clinical Indicators: presents Patient presented to the ED with fever and   malaise,  Pulse 122,   WBC 13.7, Lactic  acid  3.2,  Procalcitonin 0.54,   CT abdomen Fluid identified of the colon to suggest   clinical presentation of diarrhea.  Treatment: Rocephin, Azithromycin, IVF  LR 75ml/hr,  CT abdomen, CXR,      Thank you  Tayo Shore BSN RN CDS   M-F 6am-2pm  Options provided:  -- Yes, Sepsis was present at the time of the order to admit to the hospital  -- Other - I will add my own diagnosis  -- Disagree - Not applicable / Not valid  -- Disagree - Clinically unable to determine / Unknown  -- Refer to Clinical Documentation Reviewer    PROVIDER RESPONSE TEXT:    Yes, Sepsis was present at the time of the order to admit to the hospital.    Query created by: Tayo Shore on 2024 1:58 PM      Electronically signed by:  Davon Armstrong DO 2024 9:14 PM

## 2024-08-11 LAB
MICROORGANISM SPEC CULT: NORMAL
MICROORGANISM SPEC CULT: NORMAL
SERVICE CMNT-IMP: NORMAL
SERVICE CMNT-IMP: NORMAL
SPECIMEN DESCRIPTION: NORMAL
SPECIMEN DESCRIPTION: NORMAL

## (undated) DEVICE — PAD PT POS 36 IN SURGYPAD DISP

## (undated) DEVICE — ELECTRODE ES 36CM LAP FLAT L HK COAT DISP CLEANCOAT

## (undated) DEVICE — CADIERE FORCEPS: Brand: ENDOWRIST

## (undated) DEVICE — DRAPE,REIN 53X77,STERILE: Brand: MEDLINE

## (undated) DEVICE — TROCAR: Brand: KII FIOS FIRST ENTRY

## (undated) DEVICE — PROGRASP FORCEPS: Brand: ENDOWRIST

## (undated) DEVICE — INSUFFLATION NEEDLE TO ESTABLISH PNEUMOPERITONEUM.: Brand: INSUFFLATION NEEDLE

## (undated) DEVICE — LAPAROSCOPE 30 DEGREE 5MM

## (undated) DEVICE — TOTAL TRAY, DB, 100% SILI FOLEY, 16FR 10: Brand: MEDLINE

## (undated) DEVICE — PLUMEPORT LAPAROSCOPIC SMOKE FILTRATION DEVICE: Brand: PLUMEPORT ACTIV

## (undated) DEVICE — NEEDLE CLOSURE OMNICLOSE

## (undated) DEVICE — BLADELESS OBTURATOR: Brand: WECK VISTA

## (undated) DEVICE — Device

## (undated) DEVICE — ROUND TIP SCISSORS: Brand: ENDOWRIST

## (undated) DEVICE — PERMANENT CAUTERY HOOK: Brand: ENDOWRIST

## (undated) DEVICE — GAUZE,SPONGE,4"X4",8PLY,STRL,LF,10/TRAY: Brand: MEDLINE

## (undated) DEVICE — SET INSTRUMENT LAP I

## (undated) DEVICE — TIP COVER ACCESSORY

## (undated) DEVICE — AGENT HEMSTAT W2XL4IN OXIDIZED REGENERATED CELOS ABSRB

## (undated) DEVICE — [HIGH FLOW INSUFFLATOR,  DO NOT USE IF PACKAGE IS DAMAGED,  KEEP DRY,  KEEP AWAY FROM SUNLIGHT,  PROTECT FROM HEAT AND RADIOACTIVE SOURCES.]: Brand: PNEUMOSURE

## (undated) DEVICE — MEDIUM-LARGE CLIP APPLIER: Brand: ENDOWRIST

## (undated) DEVICE — DRAIN CHN 19FR L0.25MM DIA6.3MM SIL RND HUBLESS FULL FLUT

## (undated) DEVICE — SET INST DAVINCI LAP

## (undated) DEVICE — CAMERA STRYKER 1488 HD GEN

## (undated) DEVICE — ROBOTIC: Brand: MEDLINE INDUSTRIES, INC.

## (undated) DEVICE — ARM DRAPE

## (undated) DEVICE — APPLIER CLP M/L SHFT DIA5MM 15 LIG LIGAMAX 5

## (undated) DEVICE — PUMP SUC IRR TBNG L10FT W/ HNDPC ASSEMB STRYKEFLOW 2

## (undated) DEVICE — SCOPE DAVINCI XI 30 DEG W/CORD

## (undated) DEVICE — GLOVE SURG SZ 75 L12IN FNGR THK94MIL TRNSLUC YEL LTX

## (undated) DEVICE — CLIP INT M L POLYMER LOK LIG HEM O LOK

## (undated) DEVICE — FLUORESCENCE IMAGING PROCEDURE KIT: Brand: FIREFLY

## (undated) DEVICE — COLUMN DRAPE

## (undated) DEVICE — TIBURON GENERAL ENDOSCOPY DRAPE: Brand: CONVERTORS

## (undated) DEVICE — INTENDED FOR TISSUE SEPARATION, AND OTHER PROCEDURES THAT REQUIRE A SHARP SURGICAL BLADE TO PUNCTURE OR CUT.: Brand: BARD-PARKER ® STAINLESS STEEL BLADES

## (undated) DEVICE — TROCAR: Brand: KII® SLEEVE

## (undated) DEVICE — SET INST DAVINCI ACCESSORIES

## (undated) DEVICE — TISSUE RETRIEVAL SYSTEM: Brand: INZII RETRIEVAL SYSTEM

## (undated) DEVICE — CANNULA SEAL

## (undated) DEVICE — SYRINGE 20ML LL S/C 50